# Patient Record
Sex: FEMALE | Race: WHITE | Employment: FULL TIME | URBAN - METROPOLITAN AREA
[De-identification: names, ages, dates, MRNs, and addresses within clinical notes are randomized per-mention and may not be internally consistent; named-entity substitution may affect disease eponyms.]

---

## 2018-03-06 ENCOUNTER — OFFICE VISIT (OUTPATIENT)
Dept: INTERNAL MEDICINE CLINIC | Facility: CLINIC | Age: 33
End: 2018-03-06
Payer: COMMERCIAL

## 2018-03-06 VITALS
HEART RATE: 83 BPM | DIASTOLIC BLOOD PRESSURE: 86 MMHG | SYSTOLIC BLOOD PRESSURE: 144 MMHG | OXYGEN SATURATION: 96 % | WEIGHT: 293 LBS | RESPIRATION RATE: 18 BRPM | HEIGHT: 69 IN | BODY MASS INDEX: 43.4 KG/M2

## 2018-03-06 DIAGNOSIS — R00.2 PALPITATIONS: ICD-10-CM

## 2018-03-06 DIAGNOSIS — E28.2 PCOS (POLYCYSTIC OVARIAN SYNDROME): ICD-10-CM

## 2018-03-06 DIAGNOSIS — Z13.6 SCREENING FOR CARDIOVASCULAR CONDITION: ICD-10-CM

## 2018-03-06 DIAGNOSIS — E66.01 MORBID OBESITY (HCC): ICD-10-CM

## 2018-03-06 DIAGNOSIS — F41.1 GENERALIZED ANXIETY DISORDER: Primary | ICD-10-CM

## 2018-03-06 DIAGNOSIS — R73.9 HYPERGLYCEMIA: ICD-10-CM

## 2018-03-06 PROCEDURE — 99204 OFFICE O/P NEW MOD 45 MIN: CPT | Performed by: INTERNAL MEDICINE

## 2018-03-06 PROCEDURE — 93000 ELECTROCARDIOGRAM COMPLETE: CPT | Performed by: INTERNAL MEDICINE

## 2018-03-06 NOTE — PATIENT INSTRUCTIONS
Obesity   WHAT YOU NEED TO KNOW:   What is obesity? Obesity is when your body mass index (BMI) is greater than 30  Your healthcare provider will use your height and weight to measure your BMI  What are the risks of obesity? Obesity can cause many health problems, including injuries or physical disability  You may need tests to check for the following:  · Diabetes     · High blood pressure or high cholesterol     · Heart disease     · Gallbladder or liver disease     · Cancer of the colon, breast, prostate, liver, or kidney     · Sleep apnea     · Arthritis or gout  How is obesity treated? The goal of treatment is to help you lose weight so your health will improve  Even a small decrease in BMI can reduce the risk for many health problems  Your healthcare provider will help you set a weight-loss goal   · Lifestyle changes  are the first step in treating obesity  These include making healthy food choices and getting regular physical activity  Your healthcare provider may suggest a weight-loss program that involves coaching, education, and therapy  · Medicine  may help you lose weight when it is used with a healthy diet and physical activity  · Surgery  can help you lose weight if you are very obese and have other health problems  There are several types of weight-loss surgery  Ask your healthcare provider for more information  How can I be successful at losing weight? · Set small, realistic goals  An example of a small goal is to walk for 20 minutes 5 days a week  Anther goal is to lose 5% of your body weight  · Tell friends, family members, and coworkers about your goals  and ask for their support  Ask a friend to lose weight with you, or join a weight-loss support group  · Identify foods or triggers that may cause you to overeat , and find ways to avoid them  Remove tempting high-calorie foods from your home and workplace  Place a bowl of fresh fruit on your kitchen counter   If stress causes you to eat, then find other ways to cope with stress  · Keep a diary to track what you eat and drink  Also write down how many minutes of physical activity you do each day  Weigh yourself once a week and record it in your diary  What eating changes should I make? You will need to eat 500 to 1,000 fewer calories each day than you currently eat to lose 1 to 2 pounds a week  The following changes will help you cut calories:  · Eat smaller portions  Use small plates, no larger than 9 inches in diameter  Fill your plate half full of fruits and vegetables  Measure your food using measuring cups until you know what a serving size looks like  · Eat 3 meals and 1 or 2 snacks each day  Plan your meals in advance  [place] and eat at home most of the time  Eat slowly  · Eat fruits and vegetables at every meal   They are low in calories and high in fiber, which makes you feel full  Do not add butter, margarine, or cream sauce to vegetables  Use herbs to season steamed vegetables  · Eat less fat and fewer fried foods  Eat more baked or grilled chicken and fish  These protein sources are lower in calories and fat than red meat  Limit fast food  Dress your salads with olive oil and vinegar instead of bottled dressing  · Limit the amount of sugar you eat  Do not drink sugary beverages  Limit alcohol  What activity changes should I make? Physical activity is good for your body in many ways  It helps you burn calories and build strong muscles  It decreases stress and depression, and improves your mood  It can also help you sleep better  Talk to your healthcare provider before you begin an exercise program   · Exercise for at least 30 minutes 5 days a week  Start slowly  Set aside time each day for physical activity that you enjoy and that is convenient for you  It is best to do both weight training and an activity that increases your heart rate, such as walking, bicycling, or swimming       · Find ways to be more active  Do yard work and housecleaning  Walk up the stairs instead of using elevators  Spend your leisure time going to events that require walking, such as outdoor festivals or fairs  This extra physical activity can help you lose weight and keep it off  When should I seek immediate care? · You have a severe headache, confusion, or difficulty speaking  · You have weakness on one side of your body  · You have chest pain, sweating, or shortness of breath  When should I contact my healthcare provider? · You have symptoms of gallbladder or liver disease, such as pain in your upper abdomen  · You have knee or hip pain and discomfort while walking  · You have symptoms of diabetes, such as intense hunger and thirst, and frequent urination  · You have symptoms of sleep apnea, such as snoring or daytime sleepiness  · You have questions or concerns about your condition or care  CARE AGREEMENT:   You have the right to help plan your care  Learn about your health condition and how it may be treated  Discuss treatment options with your caregivers to decide what care you want to receive  You always have the right to refuse treatment  The above information is an  only  It is not intended as medical advice for individual conditions or treatments  Talk to your doctor, nurse or pharmacist before following any medical regimen to see if it is safe and effective for you  © 2017 [address] [employer] Information is for End User's use only and may not be sold, redistributed or otherwise used for commercial purposes  All illustrations and images included in CareNotes® are the copyrighted property of A D A M , Inc  or Jeff Rios

## 2018-03-08 ENCOUNTER — TELEPHONE (OUTPATIENT)
Dept: INTERNAL MEDICINE CLINIC | Facility: CLINIC | Age: 33
End: 2018-03-08

## 2018-03-08 ENCOUNTER — LAB (OUTPATIENT)
Dept: LAB | Facility: CLINIC | Age: 33
End: 2018-03-08
Payer: COMMERCIAL

## 2018-03-08 DIAGNOSIS — F41.1 GENERALIZED ANXIETY DISORDER: ICD-10-CM

## 2018-03-08 DIAGNOSIS — Z13.6 SCREENING FOR CARDIOVASCULAR CONDITION: ICD-10-CM

## 2018-03-08 DIAGNOSIS — R73.9 HYPERGLYCEMIA: ICD-10-CM

## 2018-03-08 LAB
ALBUMIN SERPL BCP-MCNC: 3.9 G/DL (ref 3.5–5)
ALP SERPL-CCNC: 54 U/L (ref 46–116)
ALT SERPL W P-5'-P-CCNC: 53 U/L (ref 12–78)
ANION GAP SERPL CALCULATED.3IONS-SCNC: 6 MMOL/L (ref 4–13)
AST SERPL W P-5'-P-CCNC: 24 U/L (ref 5–45)
BASOPHILS # BLD AUTO: 0.03 THOUSANDS/ΜL (ref 0–0.1)
BASOPHILS NFR BLD AUTO: 0 % (ref 0–1)
BILIRUB SERPL-MCNC: 0.62 MG/DL (ref 0.2–1)
BUN SERPL-MCNC: 10 MG/DL (ref 5–25)
CALCIUM SERPL-MCNC: 8.6 MG/DL (ref 8.3–10.1)
CHLORIDE SERPL-SCNC: 104 MMOL/L (ref 100–108)
CHOLEST SERPL-MCNC: 156 MG/DL (ref 50–200)
CO2 SERPL-SCNC: 28 MMOL/L (ref 21–32)
CREAT SERPL-MCNC: 0.85 MG/DL (ref 0.6–1.3)
EOSINOPHIL # BLD AUTO: 0.21 THOUSAND/ΜL (ref 0–0.61)
EOSINOPHIL NFR BLD AUTO: 3 % (ref 0–6)
ERYTHROCYTE [DISTWIDTH] IN BLOOD BY AUTOMATED COUNT: 12.2 % (ref 11.6–15.1)
EST. AVERAGE GLUCOSE BLD GHB EST-MCNC: 103 MG/DL
GFR SERPL CREATININE-BSD FRML MDRD: 91 ML/MIN/1.73SQ M
GLUCOSE P FAST SERPL-MCNC: 76 MG/DL (ref 65–99)
HBA1C MFR BLD: 5.2 % (ref 4.2–6.3)
HCT VFR BLD AUTO: 42.4 % (ref 34.8–46.1)
HDLC SERPL-MCNC: 39 MG/DL (ref 40–60)
HGB BLD-MCNC: 15.1 G/DL (ref 11.5–15.4)
LDLC SERPL CALC-MCNC: 89 MG/DL (ref 0–100)
LYMPHOCYTES # BLD AUTO: 3.2 THOUSANDS/ΜL (ref 0.6–4.47)
LYMPHOCYTES NFR BLD AUTO: 43 % (ref 14–44)
MCH RBC QN AUTO: 31.8 PG (ref 26.8–34.3)
MCHC RBC AUTO-ENTMCNC: 35.6 G/DL (ref 31.4–37.4)
MCV RBC AUTO: 89 FL (ref 82–98)
MONOCYTES # BLD AUTO: 0.52 THOUSAND/ΜL (ref 0.17–1.22)
MONOCYTES NFR BLD AUTO: 7 % (ref 4–12)
NEUTROPHILS # BLD AUTO: 3.46 THOUSANDS/ΜL (ref 1.85–7.62)
NEUTS SEG NFR BLD AUTO: 47 % (ref 43–75)
NRBC BLD AUTO-RTO: 0 /100 WBCS
PLATELET # BLD AUTO: 238 THOUSANDS/UL (ref 149–390)
PMV BLD AUTO: 11.4 FL (ref 8.9–12.7)
POTASSIUM SERPL-SCNC: 3.4 MMOL/L (ref 3.5–5.3)
PROT SERPL-MCNC: 8.2 G/DL (ref 6.4–8.2)
RBC # BLD AUTO: 4.75 MILLION/UL (ref 3.81–5.12)
SODIUM SERPL-SCNC: 138 MMOL/L (ref 136–145)
TRIGL SERPL-MCNC: 141 MG/DL
TSH SERPL DL<=0.05 MIU/L-ACNC: 2 UIU/ML (ref 0.36–3.74)
WBC # BLD AUTO: 7.43 THOUSAND/UL (ref 4.31–10.16)

## 2018-03-08 PROCEDURE — 85025 COMPLETE CBC W/AUTO DIFF WBC: CPT

## 2018-03-08 PROCEDURE — 84443 ASSAY THYROID STIM HORMONE: CPT

## 2018-03-08 PROCEDURE — 36415 COLL VENOUS BLD VENIPUNCTURE: CPT

## 2018-03-08 PROCEDURE — 83036 HEMOGLOBIN GLYCOSYLATED A1C: CPT

## 2018-03-08 PROCEDURE — 80061 LIPID PANEL: CPT

## 2018-03-08 PROCEDURE — 80053 COMPREHEN METABOLIC PANEL: CPT

## 2018-03-09 NOTE — TELEPHONE ENCOUNTER
----- Message from Yaz Israel MD sent at 3/8/2018  5:50 PM EST -----  Potassium slightly low, pl eat a diet rich in potassium

## 2018-03-13 ENCOUNTER — HOSPITAL ENCOUNTER (EMERGENCY)
Facility: HOSPITAL | Age: 33
Discharge: HOME | End: 2018-03-13
Attending: EMERGENCY MEDICINE
Payer: COMMERCIAL

## 2018-03-13 ENCOUNTER — APPOINTMENT (EMERGENCY)
Dept: CT IMAGING | Facility: HOSPITAL | Age: 33
End: 2018-03-13
Payer: COMMERCIAL

## 2018-03-13 VITALS
BODY MASS INDEX: 43.4 KG/M2 | HEART RATE: 86 BPM | DIASTOLIC BLOOD PRESSURE: 71 MMHG | WEIGHT: 293 LBS | TEMPERATURE: 98 F | SYSTOLIC BLOOD PRESSURE: 138 MMHG | HEIGHT: 69 IN | RESPIRATION RATE: 18 BRPM | OXYGEN SATURATION: 98 %

## 2018-03-13 DIAGNOSIS — R00.2 PALPITATIONS: ICD-10-CM

## 2018-03-13 DIAGNOSIS — R42 DIZZINESS: Primary | ICD-10-CM

## 2018-03-13 DIAGNOSIS — I49.8 BIGEMINY: ICD-10-CM

## 2018-03-13 LAB
ANION GAP SERPL CALCULATED.3IONS-SCNC: 8 MMOL/L (ref 4–13)
BASOPHILS # BLD AUTO: 0.07 THOUSANDS/ΜL (ref 0–0.1)
BASOPHILS NFR BLD AUTO: 1 % (ref 0–1)
BUN SERPL-MCNC: 9 MG/DL (ref 5–25)
CALCIUM SERPL-MCNC: 9.7 MG/DL (ref 8.3–10.1)
CHLORIDE SERPL-SCNC: 103 MMOL/L (ref 100–108)
CO2 SERPL-SCNC: 28 MMOL/L (ref 21–32)
CREAT SERPL-MCNC: 0.83 MG/DL (ref 0.6–1.3)
EOSINOPHIL # BLD AUTO: 0.23 THOUSAND/ΜL (ref 0–0.61)
EOSINOPHIL NFR BLD AUTO: 3 % (ref 0–6)
ERYTHROCYTE [DISTWIDTH] IN BLOOD BY AUTOMATED COUNT: 11.9 % (ref 11.6–15.1)
GFR SERPL CREATININE-BSD FRML MDRD: 94 ML/MIN/1.73SQ M
GLUCOSE SERPL-MCNC: 91 MG/DL (ref 65–140)
HCG SERPL QL: NEGATIVE
HCT VFR BLD AUTO: 41.8 % (ref 34.8–46.1)
HGB BLD-MCNC: 14.8 G/DL (ref 11.5–15.4)
LYMPHOCYTES # BLD AUTO: 4.08 THOUSANDS/ΜL (ref 0.6–4.47)
LYMPHOCYTES NFR BLD AUTO: 46 % (ref 14–44)
MCH RBC QN AUTO: 31.2 PG (ref 26.8–34.3)
MCHC RBC AUTO-ENTMCNC: 35.4 G/DL (ref 31.4–37.4)
MCV RBC AUTO: 88 FL (ref 82–98)
MONOCYTES # BLD AUTO: 0.75 THOUSAND/ΜL (ref 0.17–1.22)
MONOCYTES NFR BLD AUTO: 8 % (ref 4–12)
NEUTROPHILS # BLD AUTO: 3.72 THOUSANDS/ΜL (ref 1.85–7.62)
NEUTS SEG NFR BLD AUTO: 42 % (ref 43–75)
NRBC BLD AUTO-RTO: 0 /100 WBCS
PLATELET # BLD AUTO: 249 THOUSANDS/UL (ref 149–390)
PMV BLD AUTO: 10.8 FL (ref 8.9–12.7)
POTASSIUM SERPL-SCNC: 4 MMOL/L (ref 3.5–5.3)
RBC # BLD AUTO: 4.74 MILLION/UL (ref 3.81–5.12)
SODIUM SERPL-SCNC: 139 MMOL/L (ref 136–145)
TROPONIN I SERPL-MCNC: <0.02 NG/ML
WBC # BLD AUTO: 8.88 THOUSAND/UL (ref 4.31–10.16)

## 2018-03-13 PROCEDURE — 70450 CT HEAD/BRAIN W/O DYE: CPT

## 2018-03-13 PROCEDURE — 80048 BASIC METABOLIC PNL TOTAL CA: CPT | Performed by: EMERGENCY MEDICINE

## 2018-03-13 PROCEDURE — 36415 COLL VENOUS BLD VENIPUNCTURE: CPT | Performed by: EMERGENCY MEDICINE

## 2018-03-13 PROCEDURE — 99284 EMERGENCY DEPT VISIT MOD MDM: CPT

## 2018-03-13 PROCEDURE — 85025 COMPLETE CBC W/AUTO DIFF WBC: CPT | Performed by: EMERGENCY MEDICINE

## 2018-03-13 PROCEDURE — 93005 ELECTROCARDIOGRAM TRACING: CPT

## 2018-03-13 PROCEDURE — 84484 ASSAY OF TROPONIN QUANT: CPT | Performed by: EMERGENCY MEDICINE

## 2018-03-13 PROCEDURE — 84703 CHORIONIC GONADOTROPIN ASSAY: CPT | Performed by: EMERGENCY MEDICINE

## 2018-03-13 NOTE — ED PROVIDER NOTES
History  Chief Complaint   Patient presents with    Dizziness     Pt with episodes of dizziness and some heart palpitations that started today  Pt with a hx of anxiety      HPI  Patient is a 19-year-old female, reports over the last 2 weeks she has had dizziness, which she describes as a steady feeling comma at times feels like she is going to pass out  Patient reports some heart palpitations, she reports she got an apple watch and apparently heart rate has gone to 39 over the last 2 weeks  Patient was seen by her family doctor CT some diagnostic testing, was told that she had hypokalemia  I was able to review those reports in epic  Patient denies any current chest pain  There is no shortness of breath  There is no loss of consciousness  Patient reports her symptoms may be related to anxiety but was concerned so came to the emergency department due to the dizziness and feeling unsteady  She denies any focal weakness  There is no numbness  Past medical history of polycystic ovarian syndrome, anxiety,  Family history noncontributory  Social history nonsmoker, here with her mom  None       Past Medical History:   Diagnosis Date    Anxiety     PCOS (polycystic ovarian syndrome)        History reviewed  No pertinent surgical history  Family History   Problem Relation Age of Onset   [employer] Breast cancer Mother     Alcohol abuse Father     Heart failure Father     Breast cancer Maternal Grandmother     Diabetes Paternal Grandmother     Bipolar disorder Paternal Aunt     Alcohol abuse Paternal Aunt      I have reviewed and agree with the history as documented  Social History   Substance Use Topics    Smoking status: Never Smoker    Smokeless tobacco: Never Used    Alcohol use Yes      Comment: Social        Review of Systems   Constitutional: Negative for diaphoresis, fatigue and fever  HENT: Negative for congestion, ear pain, nosebleeds and sore throat      Eyes: Negative for photophobia, pain, discharge and visual disturbance  Respiratory: Negative for cough, choking, chest tightness, shortness of breath and wheezing  Cardiovascular: Positive for palpitations  Negative for chest pain  Gastrointestinal: Negative for abdominal distention, abdominal pain, diarrhea and vomiting  Genitourinary: Negative for dysuria, flank pain and frequency  Musculoskeletal: Negative for back pain, gait problem and joint swelling  Skin: Negative for color change and rash  Neurological: Positive for dizziness and headaches  Negative for syncope  Psychiatric/Behavioral: Negative for behavioral problems and confusion  The patient is not nervous/anxious  All other systems reviewed and are negative  Physical Exam  ED Triage Vitals [03/13/18 1716]   Temperature Pulse Respirations Blood Pressure SpO2   98 °F (36 7 °C) (!) 54 18 (!) 231/109 100 %      Temp Source Heart Rate Source Patient Position - Orthostatic VS BP Location FiO2 (%)   Oral Monitor Sitting Right arm --      Pain Score       No Pain           Orthostatic Vital Signs  Vitals:    03/13/18 1716 03/13/18 1839   BP: (!) 231/109 138/71   Pulse: (!) 54 86   Patient Position - Orthostatic VS: Sitting Lying       Physical Exam   Constitutional: She is oriented to person, place, and time  She appears well-developed and well-nourished  HENT:   Head: Normocephalic  Right Ear: External ear normal    Left Ear: External ear normal    Nose: Nose normal    Mouth/Throat: Oropharynx is clear and moist    There is an effusion on the left side left tympanic membrane, discussed with patient advised use Flonase  Eyes: EOM and lids are normal  Pupils are equal, round, and reactive to light  Neck: Normal range of motion  Neck supple  Cardiovascular: Normal rate, regular rhythm, normal heart sounds and intact distal pulses  Pulmonary/Chest: Effort normal and breath sounds normal  No respiratory distress  Abdominal: Soft   Bowel sounds are normal  There is no tenderness  Musculoskeletal: Normal range of motion  She exhibits no deformity  Neurological: She is alert and oriented to person, place, and time  She has normal strength  No cranial nerve deficit or sensory deficit  She displays a negative Romberg sign  Coordination normal  GCS eye subscore is 4  GCS verbal subscore is 5  GCS motor subscore is 6  No focal weakness, normal gait, patient is steady with her eyes closed and her leg apart  Skin: Skin is warm and dry  Psychiatric: She has a normal mood and affect  Nursing note and vitals reviewed  pulse oximetry 100% on room air adequate oxygenation, there is no hypoxia    ED Medications  Medications - No data to display    Diagnostic Studies  Results Reviewed     Procedure Component Value Units Date/Time    Basic metabolic panel [[id number]] Collected:  03/13/18 1745    Lab Status:  Final result Specimen:  Blood from Arm, Left Updated:  03/13/18 1813     Sodium 139 mmol/L      Potassium 4 0 mmol/L      Chloride 103 mmol/L      CO2 28 mmol/L      Anion Gap 8 mmol/L      BUN 9 mg/dL      Creatinine 0 83 mg/dL      Glucose 91 mg/dL      Calcium 9 7 mg/dL      eGFR 94 ml/min/1 73sq m     Narrative:         National Kidney Disease Education Program recommendations are as follows:  GFR calculation is accurate only with a steady state creatinine  Chronic Kidney disease less than 60 ml/min/1 73 sq  meters  Kidney failure less than 15 ml/min/1 73 sq  meters      hCG, qualitative pregnancy [[id number]]  (Normal) Collected:  03/13/18 1745    Lab Status:  Final result Specimen:  Blood from Arm, Left Updated:  03/13/18 1813     Preg, Serum Negative    Troponin I [[id number]]  (Normal) Collected:  03/13/18 1745    Lab Status:  Final result Specimen:  Blood from Arm, Left Updated:  03/13/18 1811     Troponin I <0 02 ng/mL     Narrative:         [employer] Chemistry analyzer 99% cutoff is > 0 04 ng/mL in network labs    o cTnI 99% cutoff is useful only when applied to patients in the clinical setting of myocardial ischemia  o cTnI 99% cutoff should be interpreted in the context of clinical history, ECG findings and possibly cardiac imaging to establish correct diagnosis  o cTnI 99% cutoff may be suggestive but clearly not indicative of a coronary event without the clinical setting of myocardial ischemia  CBC and differential [[id number]]  (Abnormal) Collected:  03/13/18 1745    Lab Status:  Final result Specimen:  Blood from Arm, Left Updated:  03/13/18 1753     WBC 8 88 Thousand/uL      RBC 4 74 Million/uL      Hemoglobin 14 8 g/dL      Hematocrit 41 8 %      MCV 88 fL      MCH 31 2 pg      MCHC 35 4 g/dL      RDW 11 9 %      MPV 10 8 fL      Platelets 845 Thousands/uL      nRBC 0 /100 WBCs      Neutrophils Relative 42 (L) %      Lymphocytes Relative 46 (H) %      Monocytes Relative 8 %      Eosinophils Relative 3 %      Basophils Relative 1 %      Neutrophils Absolute 3 72 Thousands/µL      Lymphocytes Absolute 4 08 Thousands/µL      Monocytes Absolute 0 75 Thousand/µL      Eosinophils Absolute 0 23 Thousand/µL      Basophils Absolute 0 07 Thousands/µL                  CT head without contrast   Final Result by Porsha Aguilar DO (03/13 1811)   No acute intracranial abnormality  Workstation performed: [id number]                    Procedures  ECG 12 Lead Documentation  Date/Time: 3/13/2018 6:01 PM  Performed by: Tea León  Authorized by: Tea León     Indications / Diagnosis:  Palpitations  ECG reviewed by me, the ED Provider: yes    Patient location:  ED  Previous ECG:     Previous ECG:  Unavailable    Comparison to cardiac monitor: Yes    Interpretation:     Interpretation: non-specific    Rate:     ECG rate:  82    ECG rate assessment: normal    Rhythm:     Rhythm: sinus rhythm    Ectopy:     Ectopy: bigeminy    QRS:     QRS axis:  Normal  Comments:      Patient had 2 EKGs 1st 1 showed bigeminy, nonspecific ST-T wave changes, no acute ST elevations    After waiting a few minutes patient had a repeat EKG which was normal, nonspecific ST-T wave changes but no bigeminy  Patient was not symptomatic during the previous EKG  Phone Contacts  ED Phone Contact    ED Course  ED Course     Diagnostic testing showed negative pregnancy test, patient's electrolytes were normal no sign of electrolyte abnormality, cardiac troponin was normal no sign of cardiac ischemia  patient's white count was normal at 8 8 no sign of inflammation, hemoglobin was normal at 14 8 no sign of anemia  CT scan of the brain showed no acute pathology, CT was required due to dizziness, comma headaches comma concern for intracranial pathology, possible brain tumor possible subarachnoid bleed  Was negative  [facility] medical decision making 80-year-old female presents with dizziness, palpitations, EKG consistent with bigeminy, no sign of cardiac ischemia, no sign of heart failure, discussed with patient advised follow up with her cardiologist   Dizziness the patient has a left ear effusion we discussed use of Flonase  We discussed follow-up with her doctor for further evaluation, normal CT scan of the brain no sign of intracranial pathology no tumor  Discussed indications to return  CritCare Time    Disposition  Final diagnoses:   Dizziness   Bigeminy   Palpitations     Time reflects when diagnosis was documented in both MDM as applicable and the Disposition within this note     Time User Action Codes Description Comment    3/13/2018  6:20 PM Tracy Greene [R42] Dizziness     3/13/2018  6:20 PM Tracy Greene [I49 9] [address]     3/13/2018  6:21 PM Sachin Fu [R00 2] Palpitations       ED Disposition     ED Disposition Condition Comment    Discharge  Fay 5930 discharge to home/self care      Condition at discharge: Good        Follow-up Information     Follow up With Specialties Details Why Tc Genao MD Internal Medicine   [id number] [id number]  [phone]          There are no discharge medications for this patient  No discharge procedures on file      ED Provider  Electronically Signed by           Srinivasa Delong MD  03/14/18 7630

## 2018-03-13 NOTE — DISCHARGE INSTRUCTIONS
Heart Palpitations   WHAT YOU NEED TO KNOW:   Heart palpitations are feelings that your heart races, jumps, throbs, or flutters  You may feel extra beats, no beats for a short time, or skipped beats  You may have these feelings in your chest, throat, or neck  They may happen when you are sitting, standing, or lying  Heart palpitations may be frightening, but are usually not caused by a serious problem  DISCHARGE INSTRUCTIONS:   Call 911 or have someone else call for any of the following:   · You have any of the following signs of a heart attack:      ¨ Squeezing, pressure, or pain in your chest that lasts longer than 5 minutes or returns    ¨ Discomfort or pain in your back, neck, jaw, stomach, or arm     ¨ Trouble breathing    ¨ Nausea or vomiting    ¨ Lightheadedness or a sudden cold sweat, especially with chest pain or trouble breathing    · You have any of the following signs of a stroke:      ¨ Numbness or drooping on one side of your face     ¨ Weakness in an arm or leg    ¨ Confusion or difficulty speaking    ¨ Dizziness, a severe headache, or vision loss    · You faint or lose consciousness  Return to the emergency department if:   · Your palpitations happen more often or get more intense  Contact your healthcare provider if:   · You have new or worsening swelling in your feet or ankles  · You have questions or concerns about your condition or care  Follow up with your healthcare provider as directed: You may need to follow up with a cardiologist  Dede Hart may need tests to check for heart problems that cause palpitations  Write down your questions so you remember to ask them during your visits  Keep a record:  Write down when your palpitations start and stop, what you were doing when they started, and your symptoms  Keep track of what you ate or drank within a few hours of your palpitations  Include anything that seemed to help your symptoms, such as lying down or holding your breath   This record will help you and your healthcare provider learn what triggers your palpitations  Bring this record with you to your follow up visits  Help prevent heart palpitations:   · Manage stress and anxiety  Find ways to relax such as listening to music, meditating, or doing yoga  Exercise can also help decrease stress and anxiety  Talk to someone you trust about your stress or anxiety  You can also talk to a therapist      · Get plenty of sleep every night  Ask your healthcare provider how much sleep you need each night  · Do not drink caffeine or alcohol  Caffeine and alcohol can make your palpitations worse  Caffeine is found in soda, coffee, tea, chocolate, and drinks that increase your energy  · Do not smoke  Nicotine and other chemicals in cigarettes and cigars may damage your heart and blood vessels  Ask your healthcare provider for information if you currently smoke and need help to quit  E-cigarettes or smokeless tobacco still contain nicotine  Talk to your healthcare provider before you use these products  · Do not use illegal drugs  Talk to your healthcare provider if you use illegal drugs and want help to quit  © 2017 [address] [facility] Information is for End User's use only and may not be sold, redistributed or otherwise used for commercial purposes  All illustrations and images included in CareNotes® are the copyrighted property of A D A M , Inc  or [address]  The above information is an  only  It is not intended as medical advice for individual conditions or treatments  Talk to your doctor, nurse or pharmacist before following any medical regimen to see if it is safe and effective for you  Follow up with your physician for further evaluation  Return increasing symptoms or problems     Dizziness   WHAT YOU NEED TO KNOW:   Dizziness is a feeling of being off balance or unsteady   Common causes of dizziness are an inner ear fluid imbalance or a lack of oxygen in your blood  Dizziness may be acute (lasts 3 days or less) or chronic (lasts longer than 3 days)  You may have dizzy spells that last from seconds to a few hours  DISCHARGE INSTRUCTIONS:   Return to the emergency department if:   · You have a headache and a stiff neck  · You have shaking chills and a fever  · You vomit over and over with no relief  · Your vomit or bowel movements are red or black  · You have pain in your chest, back, or abdomen  · You have numbness, especially in your face, arms, or legs  · You have trouble moving your arms or legs  · You are confused  Contact your healthcare provider if:   · You have a fever  · Your symptoms do not get better with treatment  · You have questions or concerns about your condition or care  Manage your symptoms:   · Do not drive  or operate heavy machinery when you are dizzy  · Get up slowly  from sitting or lying down  · Drink plenty of liquids  Liquids help prevent dehydration  Ask how much liquid to drink each day and which liquids are best for you  Follow up with your healthcare provider as directed:  Write down your questions so you remember to ask them during your visits  © 2017 [address]  Information is for End User's use only and may not be sold, redistributed or otherwise used for commercial purposes  All illustrations and images included in CareNotes® are the copyrighted property of [employer]  or Jeff Rios  The above information is an  only  It is not intended as medical advice for individual conditions or treatments  Talk to your doctor, nurse or pharmacist before following any medical regimen to see if it is safe and effective for you

## 2018-03-14 ENCOUNTER — TELEPHONE (OUTPATIENT)
Dept: INTERNAL MEDICINE CLINIC | Facility: CLINIC | Age: 33
End: 2018-03-14

## 2018-03-14 LAB
ATRIAL RATE: 82 BPM
ATRIAL RATE: 88 BPM
P AXIS: 49 DEGREES
P AXIS: 57 DEGREES
PR INTERVAL: 136 MS
PR INTERVAL: 140 MS
QRS AXIS: 96 DEGREES
QRS AXIS: 98 DEGREES
QRSD INTERVAL: 102 MS
QRSD INTERVAL: 104 MS
QT INTERVAL: 400 MS
QT INTERVAL: 404 MS
QTC INTERVAL: 467 MS
QTC INTERVAL: 488 MS
T WAVE AXIS: 63 DEGREES
T WAVE AXIS: 74 DEGREES
VENTRICULAR RATE: 82 BPM
VENTRICULAR RATE: 88 BPM

## 2018-03-14 PROCEDURE — 93010 ELECTROCARDIOGRAM REPORT: CPT | Performed by: INTERNAL MEDICINE

## 2018-03-16 ENCOUNTER — VBI (OUTPATIENT)
Dept: ADMINISTRATIVE | Facility: OTHER | Age: 33
End: 2018-03-16

## 2018-03-16 NOTE — TELEPHONE ENCOUNTER
Rosmery Jacobs    ED Visit Information     Ed visit date: 3/13/18  Diagnosis Description: Dizziness; Bigeminy; Palpitations  In Network? Yes [place]  Discharge status: Home  Discharged with meds ? NA  Number of ED visits to date: 1  ED Severity:3     Outreach Information    Outreach successful: N/A  Date letter mailed:n/a  Date Finalized:3/16/18    Care Coordination    Follow up appointment with pcp: yes 3/14/18  Transportation issues ?  NA    [employer] type:  3 Day Outreach  [facility] PCP:  Yes  Practice Contacted Patient ?:  Yes  Pt had ED follow up with pcp/staff ?:  Yes Call with NO visit scheduled   Telephonic

## 2018-03-22 RX ORDER — LISINOPRIL 10 MG/1
TABLET ORAL
COMMUNITY
Start: 2018-03-16 | End: 2019-10-25

## 2018-03-26 ENCOUNTER — OFFICE VISIT (OUTPATIENT)
Dept: FAMILY MEDICINE CLINIC | Facility: CLINIC | Age: 33
End: 2018-03-26
Payer: COMMERCIAL

## 2018-03-26 VITALS
BODY MASS INDEX: 43.22 KG/M2 | HEIGHT: 69 IN | OXYGEN SATURATION: 98 % | DIASTOLIC BLOOD PRESSURE: 72 MMHG | WEIGHT: 291.8 LBS | SYSTOLIC BLOOD PRESSURE: 140 MMHG | HEART RATE: 78 BPM

## 2018-03-26 DIAGNOSIS — E28.2 PCOS (POLYCYSTIC OVARIAN SYNDROME): ICD-10-CM

## 2018-03-26 DIAGNOSIS — F41.1 GENERALIZED ANXIETY DISORDER: Primary | ICD-10-CM

## 2018-03-26 DIAGNOSIS — E66.01 MORBID OBESITY (HCC): ICD-10-CM

## 2018-03-26 PROCEDURE — 99203 OFFICE O/P NEW LOW 30 MIN: CPT | Performed by: PHYSICIAN ASSISTANT

## 2018-03-26 RX ORDER — DIPHENOXYLATE HYDROCHLORIDE AND ATROPINE SULFATE 2.5; .025 MG/1; MG/1
1 TABLET ORAL DAILY
COMMUNITY
End: 2019-01-02 | Stop reason: ALTCHOICE

## 2018-03-26 RX ORDER — BUPROPION HYDROCHLORIDE 150 MG/1
150 TABLET ORAL DAILY
Qty: 30 TABLET | Refills: 2 | Status: SHIPPED | OUTPATIENT
Start: 2018-03-26 | End: 2018-09-14

## 2018-03-26 NOTE — PROGRESS NOTES
Assessment/Plan:       Diagnoses and all orders for this visit:    Generalized anxiety disorder  -     buPROPion (WELLBUTRIN XL) 150 mg 24 hr tablet; Take 1 tablet (150 mg total) by mouth daily    PCOS (polycystic ovarian syndrome)  -     Ambulatory referral to Obstetrics / Gynecology; Future    Morbid obesity ([facility] [address] )    Other orders  -     multivitamin (THERAGRAN) TABS; Take 1 tablet by mouth daily          Subjective:      Patient ID: Elaine Delgado is a 28 y o  female  Patient is here to establish as a patient  She is being seen by Dr catherine for irregular heartbeat and palpitations  He has recently put her on lisinopril for this and for her elevated blood pressure  She is to see him back next week  He has done it is extensive testing and he is to review that testing with her  Patient has been noting palpitations for several weeks  She also notes her anxiety has been very very bad  She has been on anxiety put medications in the past which was BuSpar and felt worse on those so she stopped them  Patient said she was better after that and did not use any medications but recently as I stated before her anxiety is worsening and she would like to start some medication  She does note that her anxiety does interfere with her daily activities at times  She feels like she just does not want to get out of bed and do anything  Patient is also reluctant to start medications due to weight gain since she is already overweight and is trying to lose weight  Patient has been diagnosed with polycystic ovarian syndrome in the past and needs to follow up with a gyn due to she has not had a period in 2 years  She does not want to go back on birth control pills because she is not sexually active  At this time she states she has changed her eating habits is he is eating more healthy  She also has started eating small snacks type meals every 2-3 hours to help her feel better          The following portions of the patient's history were reviewed and updated as appropriate:   She has a past medical history of Anxiety and PCOS (polycystic ovarian syndrome)  ,   does not have any pertinent problems on file  ,   has no past surgical history on file  ,  family history includes Alcohol abuse in her father and paternal aunt; Bipolar disorder in her paternal aunt; Breast cancer in her maternal grandmother and mother; Diabetes in her paternal grandmother; Heart failure in her father  ,   reports that she has never smoked  She has never used smokeless tobacco  She reports that she drinks alcohol  She reports that she does not use drugs  ,  has No Known Allergies     Current Outpatient Prescriptions   Medication Sig Dispense Refill    lisinopril (ZESTRIL) 10 mg tablet       multivitamin (THERAGRAN) TABS Take 1 tablet by mouth daily      buPROPion (WELLBUTRIN XL) 150 mg 24 hr tablet Take 1 tablet (150 mg total) by mouth daily 30 tablet 2     No current facility-administered medications for this visit  Review of Systems   Constitutional: Positive for appetite change and fatigue  Negative for activity change  HENT: Negative for hearing loss and trouble swallowing  Eyes: Negative for pain and visual disturbance  Respiratory: Negative for chest tightness and shortness of breath  Cardiovascular: Positive for chest pain and palpitations  Negative for leg swelling  Gastrointestinal: Positive for constipation  Endocrine: Negative  Genitourinary: Positive for menstrual problem  Negative for difficulty urinating  Musculoskeletal: Negative  Skin: Negative  Allergic/Immunologic: Negative  Neurological: Positive for dizziness and headaches  Negative for seizures, syncope, speech difficulty and numbness  Hematological: Negative  Psychiatric/Behavioral: Negative for self-injury and suicidal ideas  The patient is nervous/anxious            Objective:  Vitals:    03/26/18 1258 03/26/18 1325   BP: 160/70 140/72   Pulse: 78    SpO2: 98%    Weight: 132 kg (291 lb 12 8 oz)    Height: 5' 9" (1 753 m)      Body mass index is 43 09 kg/m²  Physical Exam   Constitutional: She is oriented to person, place, and time  She appears well-developed and well-nourished  HENT:   Head: Normocephalic  Right Ear: External ear normal    Left Ear: External ear normal    Mouth/Throat: Oropharynx is clear and moist    Eyes: Conjunctivae are normal  Pupils are equal, round, and reactive to light  Neck: Normal range of motion  Carotid bruit is not present  No tracheal deviation present  No thyromegaly present  Cardiovascular: Normal rate, normal heart sounds and intact distal pulses  An irregular rhythm present  Pulmonary/Chest: Effort normal and breath sounds normal    Abdominal: Soft  Bowel sounds are normal  She exhibits no mass  Musculoskeletal: Normal range of motion  Lymphadenopathy:     She has no cervical adenopathy  Neurological: She is alert and oriented to person, place, and time  She has normal reflexes  Skin: Skin is dry  Psychiatric: She has a normal mood and affect  Her behavior is normal  Judgment and thought content normal    Nursing note and vitals reviewed

## 2018-03-26 NOTE — PATIENT INSTRUCTIONS
Obesity   WHAT YOU NEED TO KNOW:   What is obesity? Obesity is when your body mass index (BMI) is greater than 30  Your healthcare provider will use your height and weight to measure your BMI  What are the risks of obesity? Obesity can cause many health problems, including injuries or physical disability  You may need tests to check for the following:  · Diabetes     · High blood pressure or high cholesterol     · Heart disease     · Gallbladder or liver disease     · Cancer of the colon, breast, prostate, liver, or kidney     · Sleep apnea     · Arthritis or gout  How is obesity treated? The goal of treatment is to help you lose weight so your health will improve  Even a small decrease in BMI can reduce the risk for many health problems  Your healthcare provider will help you set a weight-loss goal   · Lifestyle changes  are the first step in treating obesity  These include making healthy food choices and getting regular physical activity  Your healthcare provider may suggest a weight-loss program that involves coaching, education, and therapy  · Medicine  may help you lose weight when it is used with a healthy diet and physical activity  · Surgery  can help you lose weight if you are very obese and have other health problems  There are several types of weight-loss surgery  Ask your healthcare provider for more information  How can I be successful at losing weight? · Set small, realistic goals  An example of a small goal is to walk for 20 minutes 5 days a week  Anther goal is to lose 5% of your body weight  · Tell friends, family members, and coworkers about your goals  and ask for their support  Ask a friend to lose weight with you, or join a weight-loss support group  · Identify foods or triggers that may cause you to overeat , and find ways to avoid them  Remove tempting high-calorie foods from your home and workplace  Place a bowl of fresh fruit on your kitchen counter   If stress causes you to eat, then find other ways to cope with stress  · Keep a diary to track what you eat and drink  Also write down how many minutes of physical activity you do each day  Weigh yourself once a week and record it in your diary  What eating changes should I make? You will need to eat 500 to 1,000 fewer calories each day than you currently eat to lose 1 to 2 pounds a week  The following changes will help you cut calories:  · Eat smaller portions  Use small plates, no larger than 9 inches in diameter  Fill your plate half full of fruits and vegetables  Measure your food using measuring cups until you know what a serving size looks like  · Eat 3 meals and 1 or 2 snacks each day  Plan your meals in advance  Sonny Acevedo and eat at home most of the time  Eat slowly  · Eat fruits and vegetables at every meal   They are low in calories and high in fiber, which makes you feel full  Do not add butter, margarine, or cream sauce to vegetables  Use herbs to season steamed vegetables  · Eat less fat and fewer fried foods  Eat more baked or grilled chicken and fish  These protein sources are lower in calories and fat than red meat  Limit fast food  Dress your salads with olive oil and vinegar instead of bottled dressing  · Limit the amount of sugar you eat  Do not drink sugary beverages  Limit alcohol  What activity changes should I make? Physical activity is good for your body in many ways  It helps you burn calories and build strong muscles  It decreases stress and depression, and improves your mood  It can also help you sleep better  Talk to your healthcare provider before you begin an exercise program   · Exercise for at least 30 minutes 5 days a week  Start slowly  Set aside time each day for physical activity that you enjoy and that is convenient for you  It is best to do both weight training and an activity that increases your heart rate, such as walking, bicycling, or swimming       · Find ways to be more active  Do yard work and housecleaning  Walk up the stairs instead of using elevators  Spend your leisure time going to events that require walking, such as outdoor festivals or fairs  This extra physical activity can help you lose weight and keep it off  When should I seek immediate care? · You have a severe headache, confusion, or difficulty speaking  · You have weakness on one side of your body  · You have chest pain, sweating, or shortness of breath  When should I contact my healthcare provider? · You have symptoms of gallbladder or liver disease, such as pain in your upper abdomen  · You have knee or hip pain and discomfort while walking  · You have symptoms of diabetes, such as intense hunger and thirst, and frequent urination  · You have symptoms of sleep apnea, such as snoring or daytime sleepiness  · You have questions or concerns about your condition or care  CARE AGREEMENT:   You have the right to help plan your care  Learn about your health condition and how it may be treated  Discuss treatment options with your caregivers to decide what care you want to receive  You always have the right to refuse treatment  The above information is an  only  It is not intended as medical advice for individual conditions or treatments  Talk to your doctor, nurse or pharmacist before following any medical regimen to see if it is safe and effective for you  © 2017 [employer] [employer] Information is for End User's use only and may not be sold, redistributed or otherwise used for commercial purposes  All illustrations and images included in CareNotes® are the copyrighted property of [employer]  or [employer] Rios

## 2018-07-20 ENCOUNTER — HOSPITAL ENCOUNTER (EMERGENCY)
Facility: HOSPITAL | Age: 33
Discharge: HOME | End: 2018-07-20
Attending: EMERGENCY MEDICINE | Admitting: EMERGENCY MEDICINE
Payer: COMMERCIAL

## 2018-07-20 VITALS
BODY MASS INDEX: 44.41 KG/M2 | OXYGEN SATURATION: 98 % | HEIGHT: 68 IN | SYSTOLIC BLOOD PRESSURE: 150 MMHG | TEMPERATURE: 98.3 F | RESPIRATION RATE: 18 BRPM | DIASTOLIC BLOOD PRESSURE: 101 MMHG | WEIGHT: 293 LBS | HEART RATE: 76 BPM

## 2018-07-20 DIAGNOSIS — I83.92 SPIDER VEIN OF LEFT LOWER EXTREMITY: Primary | ICD-10-CM

## 2018-07-20 PROCEDURE — 99283 EMERGENCY DEPT VISIT LOW MDM: CPT

## 2018-07-20 NOTE — ED PROVIDER NOTES
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 Taking? buPROPion (WELLBUTRIN XL) 150 mg 24 hr tablet   No No   Sig: Take 1 tablet (150 mg total) by mouth daily   lisinopril (ZESTRIL) 10 mg tablet   Yes No   multivitamin (THERAGRAN) TABS   Yes No   Sig: Take 1 tablet by mouth daily      Facility-Administered Medications: None       Past Medical History:   Diagnosis Date    Anxiety     PCOS (polycystic ovarian syndrome)        History reviewed  No pertinent surgical history  Family History   Problem Relation Age of Onset   Melba Stallings Breast cancer Mother     Alcohol abuse Father     Heart failure Father     Breast cancer Maternal Grandmother     Diabetes Paternal Grandmother     Bipolar disorder Paternal Aunt     Alcohol abuse Paternal Aunt      I have reviewed and agree with the history as documented  Social History   Substance Use Topics    Smoking status: Never Smoker    Smokeless tobacco: Never Used    Alcohol use Yes      Comment: Social        Review of Systems   Constitutional: Negative for chills and fever  Cardiovascular: Negative for leg swelling     Skin: Negative for pallor, rash and wound  Allergic/Immunologic: Negative for immunocompromised state  Physical Exam  Physical Exam   Constitutional: She appears well-developed and well-nourished  Musculoskeletal: Normal range of motion  Small spider vein the left lateral lower extremity  No swelling  No erythema  She is neurovascularly intact distally  The left leg is no bigger than the right leg  Skin: Skin is warm and dry  Capillary refill takes less than 2 seconds  Psychiatric: She has a normal mood and affect  Nursing note and vitals reviewed        Vital Signs  ED Triage Vitals [07/20/18 1724]   Temperature Pulse Respirations Blood Pressure SpO2   98 3 °F (36 8 °C) 76 18 (!) 150/101 98 %      Temp Source Heart Rate Source Patient Position - Orthostatic VS BP Location FiO2 (%)   Oral Monitor Sitting Right arm --      Pain Score       --           Vitals:    07/20/18 1724   BP: (!) 150/101   Pulse: 76   Patient Position - Orthostatic VS: Sitting       Visual Acuity      ED Medications  Medications - No data to display    Diagnostic Studies  Results Reviewed     None                 No orders to display              Procedures  Procedures       Phone Contacts  ED Phone Contact    ED Course                               MDM  Number of Diagnoses or Management Options  Spider vein of left lower extremity: minor  Risk of Complications, Morbidity, and/or Mortality  Presenting problems: minimal  Management options: minimal    Patient Progress  Patient progress: stable    CritCare Time    Disposition  Final diagnoses:   Spider vein of left lower extremity     Time reflects when diagnosis was documented in both MDM as applicable and the Disposition within this note     Time User Action Codes Description Comment    7/20/2018  5:35 PM Aida Keith Add [I25 12] Spider vein of left lower extremity       ED Disposition     ED Disposition Condition Comment    Discharge  Jordan Garcia discharge to home/self care     Condition at discharge: Stable        Follow-up Information     Follow up With Specialties Details Why 19 Nancy Mc MD Internal Medicine  As needed [address] [place] [address]  [phone]            Discharge Medication List as of 7/20/2018  5:36 PM      CONTINUE these medications which have NOT CHANGED    Details   buPROPion (WELLBUTRIN XL) 150 mg 24 hr tablet Take 1 tablet (150 mg total) by mouth daily, Starting Mon 3/26/2018, Normal      lisinopril (ZESTRIL) 10 mg tablet Starting Fri 3/16/2018, Historical Med      multivitamin (THERAGRAN) TABS Take 1 tablet by mouth daily, Historical Med           No discharge procedures on file      ED Provider  Electronically Signed by           Nupur Chen DO  07/20/18 0202

## 2018-09-14 ENCOUNTER — OFFICE VISIT (OUTPATIENT)
Dept: OBGYN CLINIC | Age: 33
End: 2018-09-14
Payer: COMMERCIAL

## 2018-09-14 ENCOUNTER — TELEPHONE (OUTPATIENT)
Dept: FAMILY MEDICINE CLINIC | Facility: CLINIC | Age: 33
End: 2018-09-14

## 2018-09-14 VITALS
DIASTOLIC BLOOD PRESSURE: 74 MMHG | BODY MASS INDEX: 41.95 KG/M2 | SYSTOLIC BLOOD PRESSURE: 152 MMHG | WEIGHT: 293 LBS | HEIGHT: 70 IN

## 2018-09-14 DIAGNOSIS — E28.2 PCOS (POLYCYSTIC OVARIAN SYNDROME): ICD-10-CM

## 2018-09-14 DIAGNOSIS — Z01.411 ENCOUNTER FOR GYNECOLOGICAL EXAMINATION WITH ABNORMAL FINDING: Primary | ICD-10-CM

## 2018-09-14 PROBLEM — Z13.6 SCREENING FOR CARDIOVASCULAR CONDITION: Status: RESOLVED | Noted: 2018-03-06 | Resolved: 2018-09-14

## 2018-09-14 PROCEDURE — S0610 ANNUAL GYNECOLOGICAL EXAMINA: HCPCS | Performed by: NURSE PRACTITIONER

## 2018-09-14 PROCEDURE — 87624 HPV HI-RISK TYP POOLED RSLT: CPT | Performed by: NURSE PRACTITIONER

## 2018-09-14 PROCEDURE — G0143 SCR C/V CYTO,THINLAYER,RESCR: HCPCS | Performed by: NURSE PRACTITIONER

## 2018-09-14 RX ORDER — METOPROLOL SUCCINATE 50 MG/1
50 TABLET, EXTENDED RELEASE ORAL DAILY
COMMUNITY
Start: 2018-08-28

## 2018-09-14 RX ORDER — MELATONIN
1000 DAILY
COMMUNITY

## 2018-09-14 RX ORDER — METFORMIN HYDROCHLORIDE 500 MG/1
500 TABLET, EXTENDED RELEASE ORAL
Qty: 30 TABLET | Refills: 3 | Status: SHIPPED | OUTPATIENT
Start: 2018-09-14 | End: 2019-01-14 | Stop reason: SDUPTHER

## 2018-09-14 RX ORDER — MEDROXYPROGESTERONE ACETATE 10 MG/1
10 TABLET ORAL DAILY
Qty: 10 TABLET | Refills: 3 | Status: SHIPPED | OUTPATIENT
Start: 2018-09-14 | End: 2019-10-25

## 2018-09-14 NOTE — PROGRESS NOTES
Assessment/Plan:    No problem-specific Assessment & Plan notes found for this encounter  Diagnoses and all orders for this visit:    Encounter for gynecological examination with abnormal finding    PCOS (polycystic ovarian syndrome)    Other orders  -     metoprolol succinate (TOPROL-XL) 50 mg 24 hr tablet;   -     magnesium 30 MG tablet; Take 30 mg by mouth 2 (two) times a day  -     cholecalciferol (VITAMIN D3) 1,000 units tablet; Take 1,000 Units by mouth daily      Call as needed, encouraged calcium/vit D in her diet, all questions answered      Subjective:      Patient ID: Jeoffrey Kehr is a 35 y o  female  Pleasant 35 y o  premenopausal female here for annual exam  She has hx of PCOS and is interested in starting Provera and Glucophage to help with this  Only 2 cycles this past year  She has also decided to see  Weight Loss Program and has an appt on 11/2  She is a [employer]  Denies history of abnormal pap smears, last Pap 10 yrs ago, pap today  Denies vaginal issues  Denies pelvic pain  Hx of HTN          The following portions of the patient's history were reviewed and updated as appropriate:   She  has a past medical history of Anxiety and PCOS (polycystic ovarian syndrome)  She   Patient Active Problem List    Diagnosis Date Noted    Generalized anxiety disorder 03/06/2018    PCOS (polycystic ovarian syndrome) 03/06/2018    Hyperglycemia 03/06/2018    Morbid obesity ([address] ) 03/06/2018     She  has a past surgical history that includes No past surgeries  Her family history includes Alcohol abuse in her father and paternal aunt; Bipolar disorder in her paternal aunt; Breast cancer in her maternal grandmother and mother; Colon cancer in her paternal uncle; Diabetes in her paternal grandmother; Heart failure in her father  She  reports that she has never smoked  She has never used smokeless tobacco  She reports that she drinks alcohol  She reports that she does not use drugs    Current Outpatient Prescriptions   Medication Sig Dispense Refill    cholecalciferol (VITAMIN D3) 1,000 units tablet Take 1,000 Units by mouth daily      lisinopril (ZESTRIL) 10 mg tablet       magnesium 30 MG tablet Take 30 mg by mouth 2 (two) times a day      metoprolol succinate (TOPROL-XL) 50 mg 24 hr tablet       multivitamin (THERAGRAN) TABS Take 1 tablet by mouth daily       No current facility-administered medications for this visit  She has No Known Allergies  OB History    Para Term  AB Living   0 0 0 0 0 0   SAB TAB Ectopic Multiple Live Births   0 0 0 0 0            AT [facility]    Review of Systems      Objective:      /74 (BP Location: Right arm, Patient Position: Sitting)   Ht 5' 10" (1 778 m)   Wt 136 kg (299 lb)   LMP 2018   Breastfeeding? No   BMI 42 90 kg/m²          Physical Exam    Review of Systems   Constitutional: Negative for chills, fatigue, fever and unexpected weight change  Respiratory: Negative for shortness of breath  Gastrointestinal: Negative for anal bleeding, blood in stool, constipation and diarrhea  Genitourinary: Negative for difficulty urinating, dysuria and hematuria  Physical Exam   Constitutional: She appears well-developed and well-nourished  No distress  HENT:   Head: Normocephalic  Neck: Normal range of motion  Neck supple  Pulmonary: Effort normal   Breasts: bilateral without masses, skin changes or nipple discharge  Bilaterally soft and warm to touch  No areas of erythema or pain  Abdominal: Soft  Pelvic exam was performed with patient supine  No labial fusion  There is no rash, tenderness, lesion or injury on the right labia  There is no rash, tenderness, lesion or injury on the left labia  Uterus is not deviated, not enlarged, not fixed and not tender  Cervix exhibits no motion tenderness, no discharge and no friability  Right adnexum displays no mass, no tenderness and no fullness   Left adnexum displays no mass, no tenderness and no fullness  No erythema or tenderness in the vagina  No foreign body in the vagina  No signs of injury around the vagina  No vaginal discharge found  Lymphadenopathy:        Right: No inguinal adenopathy present  Left: No inguinal adenopathy present  LIMITED EXAM D/T BODY HABITUS

## 2018-09-14 NOTE — PATIENT INSTRUCTIONS
Polycystic Ovarian Syndrome   WHAT YOU NEED TO KNOW:   What is polycystic ovarian syndrome? Polycystic ovarian syndrome (PCOS) is a hormone disorder that may cause cysts to form on your ovaries  Cysts are bumps that are filled with fluid  The cysts can prevent your ovaries from working correctly  What causes PCOS? The exact cause of PCOS is not known  It is believed that increased insulin levels may cause the ovaries to produce higher than normal amounts of male hormones  Insulin is produced in the pancreas and helps your body use sugar  Your risk may be increased if you have a family member with PCOS or other ovarian disease  What are the signs and symptoms of PCOS? · Irregular or absent monthly periods    · Increased hair growth on the face, chest, around the nipples, or lower abdomen    · Thinning of scalp hair    · Weight gain and fatigue    · High blood sugar levels or high blood pressure    · Infertility (problem getting pregnant)    · Acne, darkening of the skin, or skin tags    · Pelvic or abdominal pain  How is PCOS diagnosed? Your healthcare provider will ask about your symptoms and when they began  He will ask if you have any family members with PCOS  He may ask about your menstrual history, pregnancies, and medicines  You may also have one or more of the following tests:  · Blood tests: These can test your hormone and blood sugar levels  · Pelvic exam:  This exam lets your healthcare provider check the size and shape of your uterus, cervix, and ovaries  · Vaginal ultrasound: An ultrasound uses sound waves to show pictures of your ovaries on a monitor so your healthcare provider can check for cysts  A small tube is placed into your vagina  How is PCOS treated? · Medicines:      ¨ Birth control pills: These medicines have female hormones, and may decrease male hormone levels  Birth control pills may control your periods, prevent cysts, or cause them to shrink   They also help decrease your risk of endometrial cancer and correct abnormal bleeding  ¨ Hypoglycemic medicines: These help to lower your blood sugar levels and decrease insulin resistance  They are also used to lower male hormone levels and help you ovulate  ¨ Antiandrogen medicines: These may help decrease male hormone levels, excess hair growth, and thinning scalp hair  ¨ Steroids: These may help lower the release of male hormones  ¨ NSAIDs:  These medicines decrease swelling and pain  You can buy NSAIDs without a doctor's order  Ask your healthcare provider which medicine is right for you, and how much to take  Take as directed  NSAIDs can cause stomach bleeding or kidney problems if not taken correctly  · Surgery: Your healthcare provider may do surgery to see your ovaries or to take a biopsy (tissue sample)  He may remove cysts or part of your ovaries  What are the risks of PCOS? You may get an infection or bleed too much after surgery to remove the cysts on your ovaries  Even with treatment, PCOS may return or get worse  PCOS may increase your risk of diabetes, high blood pressure, or heart disease  PCOS may decrease your chances of getting pregnant  Problems with your ovulation may further lead to abnormal uterine bleeding or endometrial cancer  How can I manage my symptoms? · Manage your blood sugar and blood pressure: Your healthcare provider may want you to check your blood sugar levels and blood pressure at home  Keep a record and bring this to your follow-up visits  Blood sugar is measured with a glucose monitor  The monitor tests a small drop of blood  Blood pressure is measured with a cuff that you put on your arm and tighten  Ask for more information on how to measure your blood sugar and blood pressure  · Maintain a healthy weight:  Ask your healthcare provider how much you should weigh  Ask him to help you create a weight loss plan if you are overweight   Weight loss may help reduce the complications of PCOS  · Exercise:  Ask your healthcare provider about the best exercise plan for you  Exercise can help decrease blood sugar and blood pressure  It may also help with weight loss  · Eat a variety of healthy foods:  Healthy foods include fruits, vegetables, whole-grain breads, low-fat dairy products, beans, lean meats, and fish  A dietitian may help you plan meals that are lower in carbohydrates to help you manage your blood sugar levels  Too much carbohydrate at one time can raise your blood sugar to a high level  Where can I find more information? · [facility] [employer] [facility]   O  [address]  Phone: [phone]  Phone: [phone]  Web Address: http://[employer]/  org  · The [address] [place] [address]  Riya Contreras [place] [address]  Web Address: www hormone  org  When should I contact my healthcare provider? · You have a fever  · You feel weak or tired  · You have pain during sex  · Your pain is worse or does not go away after you take your pain medicine  · You have trouble urinating or emptying your bladder completely  · You have questions or concerns about your condition or care  When should I seek immediate care or call 911? · You have a severe headache or feel dizzy  · You vomit multiple times and cannot keep food or liquids down  · You have blurred or double vision  · Your breath has a fruity sweet smell, or you feel short of breath  · You have severe lower abdominal or pelvic pain  CARE AGREEMENT:   You have the right to help plan your care  Learn about your health condition and how it may be treated  Discuss treatment options with your caregivers to decide what care you want to receive  You always have the right to refuse treatment  The above information is an  only  It is not intended as medical advice for individual conditions or treatments   Talk to your doctor, nurse or pharmacist before following any medical regimen to see if it is safe and effective for you  © 2017 [employer] Information is for End User's use only and may not be sold, redistributed or otherwise used for commercial purposes  All illustrations and images included in CareNotes® are the copyrighted property of A D A M , Inc  or Jeff Rios

## 2018-09-14 NOTE — TELEPHONE ENCOUNTER
Pt wanted to let you know that she did f/u with CYNTHIA Arguelles from the [facility] office location

## 2018-09-18 LAB
HPV HR 12 DNA CVX QL NAA+PROBE: NEGATIVE
HPV16 DNA CVX QL NAA+PROBE: NEGATIVE
HPV18 DNA CVX QL NAA+PROBE: NEGATIVE
LAB AP GYN PRIMARY INTERPRETATION: NORMAL
Lab: NORMAL

## 2018-11-15 ENCOUNTER — OFFICE VISIT (OUTPATIENT)
Dept: FAMILY MEDICINE CLINIC | Facility: CLINIC | Age: 33
End: 2018-11-15
Payer: COMMERCIAL

## 2018-11-15 ENCOUNTER — TELEPHONE (OUTPATIENT)
Dept: FAMILY MEDICINE CLINIC | Facility: CLINIC | Age: 33
End: 2018-11-15

## 2018-11-15 VITALS
HEART RATE: 67 BPM | TEMPERATURE: 98.4 F | SYSTOLIC BLOOD PRESSURE: 134 MMHG | OXYGEN SATURATION: 97 % | WEIGHT: 293 LBS | BODY MASS INDEX: 41.95 KG/M2 | DIASTOLIC BLOOD PRESSURE: 88 MMHG | HEIGHT: 70 IN

## 2018-11-15 DIAGNOSIS — M54.32 SCIATICA OF LEFT SIDE: Primary | ICD-10-CM

## 2018-11-15 DIAGNOSIS — Z23 NEED FOR TUBERCULOSIS VACCINATION: ICD-10-CM

## 2018-11-15 PROBLEM — I10 ESSENTIAL HYPERTENSION: Status: ACTIVE | Noted: 2018-11-09

## 2018-11-15 PROBLEM — E78.2 MIXED HYPERLIPIDEMIA: Status: ACTIVE | Noted: 2018-11-09

## 2018-11-15 PROBLEM — E55.9 VITAMIN D DEFICIENCY: Status: ACTIVE | Noted: 2018-11-09

## 2018-11-15 PROCEDURE — 99213 OFFICE O/P EST LOW 20 MIN: CPT | Performed by: PHYSICIAN ASSISTANT

## 2018-11-15 PROCEDURE — 3008F BODY MASS INDEX DOCD: CPT | Performed by: PHYSICIAN ASSISTANT

## 2018-11-15 PROCEDURE — 86580 TB INTRADERMAL TEST: CPT

## 2018-11-15 RX ORDER — CYCLOBENZAPRINE HCL 10 MG
10 TABLET ORAL 3 TIMES DAILY PRN
Qty: 30 TABLET | Refills: 0 | Status: SHIPPED | OUTPATIENT
Start: 2018-11-15 | End: 2019-01-02 | Stop reason: ALTCHOICE

## 2018-11-15 RX ORDER — IBUPROFEN 600 MG/1
600 TABLET ORAL EVERY 6 HOURS PRN
Qty: 90 TABLET | Refills: 1 | Status: SHIPPED | OUTPATIENT
Start: 2018-11-15 | End: 2019-01-02 | Stop reason: ALTCHOICE

## 2018-11-15 NOTE — TELEPHONE ENCOUNTER
Called pt insurance and spoke with HERBERTH Ramos and verify that pt policy is active with an effective date of 12/24/16    SCOTT

## 2018-11-15 NOTE — PROGRESS NOTES
Assessment/Plan:       Diagnoses and all orders for this visit:    Sciatica of left side  -     cyclobenzaprine (FLEXERIL) 10 mg tablet; Take 1 tablet (10 mg total) by mouth 3 (three) times a day as needed for muscle spasms  -     ibuprofen (MOTRIN) 600 mg tablet; Take 1 tablet (600 mg total) by mouth every 6 (six) hours as needed for mild pain    Need for tuberculosis vaccination  -     TB Skin Test            Subjective:      Patient ID: Janelle Joshi is a 35 y o  female  Patient is here for several reasons  One she needs a PPD placed  She understands that we cannot read it since it needs to be done on Saturday and she will go to urgent care to have it read  Two patient is noting off and on sciatica  She has had this problem for several years but it usually goes away within a day or 2  This episode is been lasting for about a week  She did get some exercises for her back from a friend who is a physical therapist   She has not started them yet  She states the pain starts in her left lower back and goes down her posterior thigh to the knee and at times to the foot  Once or twice she has noted some tingling in her foot but not on a regular basis  She denies any weakness of the left leg  She notes it is worse when she is standing  The following portions of the patient's history were reviewed and updated as appropriate:   She has a past medical history of Anxiety and PCOS (polycystic ovarian syndrome)  ,   does not have any pertinent problems on file  ,   has a past surgical history that includes No past surgeries  ,  family history includes Alcohol abuse in her father and paternal aunt; Bipolar disorder in her paternal aunt; Breast cancer in her maternal grandmother and mother; Colon cancer in her paternal uncle; Diabetes in her paternal grandmother; Heart failure in her father  ,   reports that she has never smoked  She has never used smokeless tobacco  She reports that she drinks alcohol   She reports that she does not use drugs  ,  has No Known Allergies     Current Outpatient Prescriptions   Medication Sig Dispense Refill    cholecalciferol (VITAMIN D3) 1,000 units tablet Take 1,000 Units by mouth daily      lisinopril (ZESTRIL) 10 mg tablet       magnesium 30 MG tablet Take 30 mg by mouth 2 (two) times a day      metFORMIN (GLUCOPHAGE-XR) 500 mg 24 hr tablet Take 1 tablet (500 mg total) by mouth daily with breakfast 30 tablet 3    metoprolol succinate (TOPROL-XL) 50 mg 24 hr tablet       multivitamin (THERAGRAN) TABS Take 1 tablet by mouth daily      cyclobenzaprine (FLEXERIL) 10 mg tablet Take 1 tablet (10 mg total) by mouth 3 (three) times a day as needed for muscle spasms 30 tablet 0    ibuprofen (MOTRIN) 600 mg tablet Take 1 tablet (600 mg total) by mouth every 6 (six) hours as needed for mild pain 90 tablet 1    medroxyPROGESTERone (PROVERA) 10 mg tablet Take 1 tablet (10 mg total) by mouth daily for 10 days 10 tablet 3     No current facility-administered medications for this visit  Review of Systems   Constitutional: Negative for activity change and fever  Gastrointestinal: Negative for abdominal pain  Genitourinary: Negative for difficulty urinating  Musculoskeletal: Positive for back pain  Neurological: Positive for numbness  Negative for dizziness, weakness and light-headedness  Objective:  Vitals:    11/15/18 0846   BP: 134/88   Pulse: 67   Temp: 98 4 °F (36 9 °C)   SpO2: 97%   Weight: 135 kg (297 lb 12 8 oz)   Height: 5' 10" (1 778 m)     Body mass index is 42 73 kg/m²  Physical Exam   Constitutional: She is oriented to person, place, and time  She appears well-developed and well-nourished  HENT:   Head: Normocephalic and atraumatic  Right Ear: External ear normal    Left Ear: External ear normal    Eyes: Conjunctivae are normal    Neck: Normal range of motion  Neck supple  Carotid bruit is not present     Cardiovascular: Normal rate, regular rhythm and normal heart sounds  Pulmonary/Chest: Effort normal and breath sounds normal    Musculoskeletal: Normal range of motion  She exhibits no edema or tenderness  Neurological: She is alert and oriented to person, place, and time  She has normal reflexes  Nursing note and vitals reviewed

## 2018-11-15 NOTE — PATIENT INSTRUCTIONS
Back Pain   WHAT YOU NEED TO KNOW:   What should I know about back pain? Back pain is common  You may feel sore or stiff on one or both sides of your back  The pain may spread to your buttocks or thighs  What causes or increases my risk for back pain? Conditions that affect the spine, joints, or muscles can cause back pain  These may include arthritis, spinal stenosis (narrowing of the spinal column), muscle tension, or breakdown of the spinal discs  The following increase your risk of back pain:  · Repeated bending, lifting, or twisting, or lifting heavy items    · Injury from a fall or accident    · Lack of regular physical activity     · Obesity, pregnancy     · Smoking    · Aging    · Driving, sitting, or standing for long periods    · Bad posture while sitting or standing  How is back pain diagnosed? Your healthcare provider will ask if you have any medical conditions  He may ask if you have a history of back pain and how it started  He may watch you stand and walk, and check your range of motion  Show him where you feel pain and what makes it better or worse  Describe the pain, how bad it is, and how long it lasts  Tell him if your pain worsens at night or when you lie on your back  How is back pain treated? · NSAIDs  help decrease swelling and pain  This medicine is available with or without a doctor's order  NSAIDs can cause stomach bleeding or kidney problems in certain people  If you take blood thinner medicine, always ask your healthcare provider if NSAIDs are safe for you  Always read the medicine label and follow directions  · Acetaminophen  decreases pain  It is available without a doctor's order  Ask how much to take and how often to take it  Follow directions  Acetaminophen can cause liver damage if not taken correctly  · Prescription pain medicine  may be given  Ask your healthcare provider how to take this medicine safely  How do I manage my back pain?    · Apply ice  on your back or affected area for 15 to 20 minutes every hour or as directed  Use an ice pack, or put crushed ice in a plastic bag  Cover it with a towel  Ice helps prevent tissue damage and decreases pain  · Apply heat  on your back or affected area for 20 to 30 minutes every 2 hours for as many days as directed  Heat helps decrease pain and muscle spasms  · Stay active  as much as you can without causing more pain  Bed rest could make your back pain worse  Avoid heavy lifting until your pain is gone  When should I contact my healthcare provider? · You have back pain that does not get better with rest and pain medicine  · You have a fever  · You have pain that worsens when you are on your back or when you rest     · You have pain that worsens when you cough or sneeze  · You lose weight without trying  · You have questions or concerns about your condition or care  When should I seek immediate care or call 911? · You have pain, numbness, or weakness in one or both legs  · Your pain becomes so severe that you cannot walk  · You cannot control your urine or bowel movements  · You have severe back pain with chest pain  · You have severe back pain, nausea, and vomiting  · You have severe back pain that spreads to your side or genital area  CARE AGREEMENT:   You have the right to help plan your care  Learn about your health condition and how it may be treated  Discuss treatment options with your caregivers to decide what care you want to receive  You always have the right to refuse treatment  The above information is an  only  It is not intended as medical advice for individual conditions or treatments  Talk to your doctor, nurse or pharmacist before following any medical regimen to see if it is safe and effective for you  © 2017 [employer] Information is for End User's use only and may not be sold, redistributed or otherwise used for commercial purposes   All illustrations and images included in CareNotes® are the copyrighted property of A D A M , Inc  or Jeff Rios

## 2018-12-28 RX ORDER — HYDROCHLOROTHIAZIDE 12.5 MG/1
12.5 TABLET ORAL DAILY
Refills: 1 | COMMUNITY
Start: 2018-12-19 | End: 2019-10-25

## 2019-01-02 ENCOUNTER — OFFICE VISIT (OUTPATIENT)
Dept: BARIATRICS | Facility: CLINIC | Age: 34
End: 2019-01-02
Payer: COMMERCIAL

## 2019-01-02 VITALS
TEMPERATURE: 98.3 F | SYSTOLIC BLOOD PRESSURE: 122 MMHG | DIASTOLIC BLOOD PRESSURE: 82 MMHG | HEIGHT: 69 IN | BODY MASS INDEX: 43.25 KG/M2 | WEIGHT: 292 LBS | HEART RATE: 78 BPM

## 2019-01-02 DIAGNOSIS — R73.9 HYPERGLYCEMIA: ICD-10-CM

## 2019-01-02 DIAGNOSIS — E78.2 MIXED HYPERLIPIDEMIA: ICD-10-CM

## 2019-01-02 DIAGNOSIS — E66.01 OBESITY, CLASS III, BMI 40-49.9 (MORBID OBESITY) (HCC): Primary | ICD-10-CM

## 2019-01-02 DIAGNOSIS — E28.2 PCOS (POLYCYSTIC OVARIAN SYNDROME): ICD-10-CM

## 2019-01-02 DIAGNOSIS — I10 ESSENTIAL HYPERTENSION: ICD-10-CM

## 2019-01-02 PROBLEM — E66.813 OBESITY, CLASS III, BMI 40-49.9 (MORBID OBESITY): Status: ACTIVE | Noted: 2018-03-06

## 2019-01-02 PROCEDURE — 99243 OFF/OP CNSLTJ NEW/EST LOW 30: CPT | Performed by: PHYSICIAN ASSISTANT

## 2019-01-02 NOTE — ASSESSMENT & PLAN NOTE
Taking metformin  -should improve with weight loss, dietary, and lifestyle changes  -continue management with prescribing provider

## 2019-01-02 NOTE — PROGRESS NOTES
Assessment/Plan:    Obesity, Class III, BMI 40-49 9 (morbid obesity) ([facility] [address] )  -Discussed options of [facility]-Intensive Lifestyle Intervention Program, Very Low Calorie Diet-VLCD, Conservative Program, Dwayne-En-Y Gastric Bypass and Vertical Sleeve Gastrectomy and the role of weight loss medications   -Initial weight loss goal of 5-10% weight loss for improved health  -Screening labs  Recommend checking lab coverage before having labs drawn   -lipid, fasting insulin, A1c, and tsh (11/9/2018) and cmp (11/26/2018) reviewed and all within acceptable limits except elevated fasting insulin and elevated triglycerides   - STOP BANG-3/8  -Patient is interested in pursuing Conservative Program   -not interested in surgery at this time    -could consider increasing metformin in the future to help with weight loss  Goals:  Food log (ie ) www myfitnesspal com,sparkpeople  com,loseit com,calorieking  com,etc  baritastic  No sugary beverages  At least 64oz of water daily  Increase physical activity by 10 minutes daily  Gradually increase physical activity to a goal of 5 days per week for 30 minutes of MODERATE intensity PLUS 2 days per week of FULL BODY resistance training--10 minute walk per day and increase as tolerated   7768-3033 calories per day  5-10 servings of fruits and vegetables per day    Mixed hyperlipidemia  -should improve with weight loss, dietary, and lifestyle changes      Hyperglycemia  Taking metformin  -should improve with weight loss, dietary, and lifestyle changes  -continue management with prescribing provider      Essential hypertension  Taking metoprolol, hctz and lisinopril  -should improve with weight loss, dietary, and lifestyle changes  -continue management with prescribing provider      PCOS (polycystic ovarian syndrome)  On metformin  -continue management with prescribing provider        Follow up in approximately 2 months with Non-Surgical Physician/Advanced Practitioner        Diagnoses and all orders for this visit:    Obesity, Class III, BMI 40-49 9 (morbid obesity) ([facility])    Mixed hyperlipidemia    Hyperglycemia    Essential hypertension    PCOS (polycystic ovarian syndrome)    Other orders  -     hydrochlorothiazide (HYDRODIURIL) 12 5 mg tablet; Take 12 5 mg by mouth daily          Subjective:   Chief Complaint   Patient presents with    Consult     Pt here for initial MWM consult w/PA  BMI 44 4  Negative stop bang  Patient ID: Kori Knox  is a 35 y o  female with excess weight/obesity here to pursue weight management  Past Medical History:   Diagnosis Date    Anxiety     Anxiety     Hypertension     PCOS (polycystic ovarian syndrome)        HPI:has been seeing [facility] weight management but this is closer to home so wants to start our program -- only went twice was eating more healthy and more protein and staying only to 24 grams of sugar  No juices  Was asked to start food logging but has not done so  Notes she lost 6 pounds since starting their program  notes she used to eat bagels and donuts for breakfast but has since stopped and started eating cottage cheese and grapes  Obesity/Excess Weight:  Severity: Severe  Onset:  Since teenage years    Modifiers: Diet and Exercise, Physician Supervised Weight Loss Program and Commercial Weight Loss Programs-ie  Weight Watchers, Aisha Meeker, Nutrisystem, etc   Contributing factors: Poor Food Choices and Insufficient Physical Activity  Associated symptoms: decreased self esteem, depression and anxiety    Goals:170 lbs   Hydration: 64 ounces of water  Alcohol: yearly      The following portions of the patient's history were reviewed and updated as appropriate: allergies, current medications, past family history, past medical history, past social history, past surgical history and problem list   Review of Systems   HENT: Negative for sore throat  Respiratory: Negative for cough and shortness of breath      Cardiovascular: Negative for chest pain and palpitations  Gastrointestinal: Negative for abdominal pain, constipation, diarrhea, nausea and vomiting  Denies GERD   Endocrine: Negative for cold intolerance and heat intolerance  Genitourinary: Negative for dysuria  Musculoskeletal: Positive for back pain (sciatica )  Negative for arthralgias  Skin: Negative for rash  Neurological: Negative for headaches  Psychiatric/Behavioral: Negative for suicidal ideas (denies HI)  +anxiety- controlled     Denies depression        Objective:    /82 (BP Location: Right arm, Patient Position: Sitting, Cuff Size: Large)   Pulse 78   Temp 98 3 °F (36 8 °C) (Tympanic)   Ht 5' 8 5" (1 74 m)   Wt 132 kg (292 lb)   BMI 43 75 kg/m²     Physical Exam   Nursing note and vitals reviewed  Constitutional   General appearance: Abnormal   well developed and morbidly obese  Eyes No conjunctival pallor  Ears, Nose, Mouth, and Throat Oral mucosa moist    Pulmonary   Respiratory effort: No increased work of breathing or signs of respiratory distress  Auscultation of lungs: Clear to auscultation, equal breath sounds bilaterally, no wheezes, no rales, no rhonci  Cardiovascular   Auscultation of heart: Normal rate and rhythm, normal S1 and S2, without murmurs  Examination of extremities for edema and/or varicosities: Normal   no edema  Abdomen   Abdomen: Abnormal   The abdomen was obese  Bowel sounds were normal  The abdomen was soft and nontender     Musculoskeletal   Gait and station: Normal     Psychiatric   Orientation to person, place and time: Normal     Affect: appropriate

## 2019-01-02 NOTE — PATIENT INSTRUCTIONS
Goals: Food log (ie ) www myfitnesspal com,sparkpeople  com,loseit com,calorieking  com,etc  baritastic  No sugary beverages  At least 64oz of water daily  Increase physical activity by 10 minutes daily   Gradually increase physical activity to a goal of 5 days per week for 30 minutes of MODERATE intensity PLUS 2 days per week of FULL BODY resistance training--10 minute walk per day and increase as tolerated   7592-0205 calories per day  5-10 servings of fruits and vegetables per day

## 2019-01-02 NOTE — ASSESSMENT & PLAN NOTE
-Discussed options of [facility]-Intensive Lifestyle Intervention Program, Very Low Calorie Diet-VLCD, Conservative Program, Dwayne-En-Y Gastric Bypass and Vertical Sleeve Gastrectomy and the role of weight loss medications   -Initial weight loss goal of 5-10% weight loss for improved health  -Screening labs  Recommend checking lab coverage before having labs drawn   -lipid, fasting insulin, A1c, and tsh (11/9/2018) and cmp (11/26/2018) reviewed and all within acceptable limits except elevated fasting insulin and elevated triglycerides   - STOP BANG-3/8  -Patient is interested in pursuing Conservative Program   -not interested in surgery at this time    -could consider increasing metformin in the future to help with weight loss  Goals:  Food log (ie ) www myfitnesspal com,sparkpeople  com,loseit com,calorieking  com,etc  baritastic  No sugary beverages  At least 64oz of water daily  Increase physical activity by 10 minutes daily   Gradually increase physical activity to a goal of 5 days per week for 30 minutes of MODERATE intensity PLUS 2 days per week of FULL BODY resistance training--10 minute walk per day and increase as tolerated   4903-1833 calories per day  5-10 servings of fruits and vegetables per day

## 2019-01-02 NOTE — ASSESSMENT & PLAN NOTE
Taking metoprolol, hctz and lisinopril  -should improve with weight loss, dietary, and lifestyle changes  -continue management with prescribing provider

## 2019-01-14 DIAGNOSIS — E28.2 PCOS (POLYCYSTIC OVARIAN SYNDROME): ICD-10-CM

## 2019-01-14 RX ORDER — METFORMIN HYDROCHLORIDE 500 MG/1
TABLET, EXTENDED RELEASE ORAL
Qty: 30 TABLET | Refills: 3 | Status: SHIPPED | OUTPATIENT
Start: 2019-01-14 | End: 2019-05-25 | Stop reason: SDUPTHER

## 2019-05-25 DIAGNOSIS — E28.2 PCOS (POLYCYSTIC OVARIAN SYNDROME): ICD-10-CM

## 2019-05-28 RX ORDER — METFORMIN HYDROCHLORIDE 500 MG/1
TABLET, EXTENDED RELEASE ORAL
Qty: 30 TABLET | Refills: 3 | Status: SHIPPED | OUTPATIENT
Start: 2019-05-28 | End: 2019-10-10 | Stop reason: SDUPTHER

## 2019-10-10 DIAGNOSIS — E28.2 PCOS (POLYCYSTIC OVARIAN SYNDROME): ICD-10-CM

## 2019-10-11 RX ORDER — METFORMIN HYDROCHLORIDE 500 MG/1
TABLET, EXTENDED RELEASE ORAL
Qty: 30 TABLET | Refills: 0 | Status: SHIPPED | OUTPATIENT
Start: 2019-10-11 | End: 2019-11-04 | Stop reason: SDUPTHER

## 2019-10-25 ENCOUNTER — ANNUAL EXAM (OUTPATIENT)
Dept: OBGYN CLINIC | Age: 34
End: 2019-10-25
Payer: COMMERCIAL

## 2019-10-25 VITALS
WEIGHT: 293 LBS | SYSTOLIC BLOOD PRESSURE: 122 MMHG | DIASTOLIC BLOOD PRESSURE: 62 MMHG | HEIGHT: 70 IN | BODY MASS INDEX: 41.95 KG/M2

## 2019-10-25 DIAGNOSIS — N92.6 IRREGULAR MENSES: ICD-10-CM

## 2019-10-25 DIAGNOSIS — Z01.411 ENCOUNTER FOR GYNECOLOGICAL EXAMINATION WITH ABNORMAL FINDING: Primary | ICD-10-CM

## 2019-10-25 DIAGNOSIS — E28.2 PCOS (POLYCYSTIC OVARIAN SYNDROME): ICD-10-CM

## 2019-10-25 PROCEDURE — S0612 ANNUAL GYNECOLOGICAL EXAMINA: HCPCS | Performed by: NURSE PRACTITIONER

## 2019-10-25 RX ORDER — LISINOPRIL AND HYDROCHLOROTHIAZIDE 20; 12.5 MG/1; MG/1
1 TABLET ORAL DAILY
Refills: 1 | COMMUNITY
Start: 2019-10-15

## 2019-10-25 RX ORDER — DROSPIRENONE AND ETHINYL ESTRADIOL 0.02-3(28)
1 KIT ORAL DAILY
Qty: 28 TABLET | Refills: 2 | Status: SHIPPED | OUTPATIENT
Start: 2019-10-25 | End: 2020-01-10

## 2019-10-25 NOTE — PATIENT INSTRUCTIONS
Birth Control Pills   WHAT YOU NEED TO KNOW:   What are birth control pills? Birth control pills are also called oral contraceptives, or the pill  It is medicine that helps prevent pregnancy  Birth control pills work by preventing ovulation  Ovulation is when the ovaries make and release an egg cell each month  If this egg gets fertilized by sperm, pregnancy occurs  Birth control pills may also help to prevent pregnancy by keeping sperm from fertilizing an egg  What may be done before I can start taking birth control pills? You need to see your healthcare provider to get a prescription  Any of the following may be done before your healthcare provider gives you a prescription:  · Your healthcare provider will ask you about diseases and illnesses you have had in the past  He will check your risk for blood clots, heart conditions, or stroke  He will also check your blood pressure, and may do a breast and pelvic exam  A Pap smear may also be done during the pelvic exam  This is a test to make sure you do not have abnormal changes on your cervix  You may need other tests, such as a urine test, to make sure you are not pregnant  · Your healthcare provider will ask if you take any medicines and if you smoke  Smoking increases your risk for stroke, heart attack, or a blood clot in your lungs  If you smoke, you should not take certain kinds of birth control pills  What are the advantages of birth control pills? When birth control pills are used correctly, the chances of getting pregnant are very low  Birth control pills may help decrease bleeding and pain during your monthly period  They may also help prevent cancer of the uterus and ovaries  What are the disadvantages of birth control pills? You may have sudden changes in your mood or feelings while you take birth control pills  You may have nausea and decreased sex drive  You may have an increased appetite and rapid weight gain   You may also have bleeding in between periods, less frequent periods, vaginal dryness, and breast pain  Birth control pills will not protect you from sexually transmitted infections  Rarely, some birth control pills can increase your risk for a blood clot  This may become life-threatening  What should I do if I decide I want to get pregnant? If you are planning to have a baby, ask your healthcare provider when you may stop taking your birth control pills  It may take some time for you to start ovulating again  Ask your healthcare provider for more information about pregnancy after birth control pills  When should I start taking birth control pills after I have a baby? If you are not breastfeeding, you may start taking birth control pills 3 weeks after you give birth  You may be able to take certain types of birth control pills if you are breastfeeding  These pills can be started from 6 weeks to 6 months after you give birth  Ask your healthcare provider for more information about when to start taking birth control pills after you give birth  When should I contact my healthcare provider? · You have forgotten to take a birth control pill  · You have mood changes, such as depression, since starting birth control pills  · You have nausea or you are vomiting  · You have severe abdominal pain  · You missed a period and have questions or concerns about being pregnant  · You still have bleeding 4 months after taking birth control pills correctly  · You have questions or concerns about your condition or care  When should I seek immediate care or call 911? · Your arm or leg feels warm, tender, and painful  It may look swollen and red  · You feel lightheaded, short of breath, and have chest pain  · You cough up blood      · You have any of the following signs of a stroke:      ¨ Numbness or drooping on one side of your face     ¨ Weakness in an arm or leg    ¨ Confusion or difficulty speaking    ¨ Dizziness, a severe headache, or vision loss    · You have severe pain, numbness, or swelling in your arms or legs  CARE AGREEMENT:   You have the right to help plan your care  Learn about your health condition and how it may be treated  Discuss treatment options with your caregivers to decide what care you want to receive  You always have the right to refuse treatment  The above information is an  only  It is not intended as medical advice for individual conditions or treatments  Talk to your doctor, nurse or pharmacist before following any medical regimen to see if it is safe and effective for you  © 2017 [facility] Information is for End User's use only and may not be sold, redistributed or otherwise used for commercial purposes  All illustrations and images included in CareNotes® are the copyrighted property of A D A M [employer]  or Jeff Rios

## 2019-10-25 NOTE — PROGRESS NOTES
Diagnoses and all orders for this visit:    Encounter for gynecological examination with abnormal finding    PCOS (polycystic ovarian syndrome)  -     drospirenone-ethinyl estradiol (CHEMA) 3-0 02 MG per tablet; Take 1 tablet by mouth daily for 28 days    Irregular menses  -     US pelvis transabdominal only; Future  -     drospirenone-ethinyl estradiol (CHEMA) 3-0 02 MG per tablet; Take 1 tablet by mouth daily for 28 days    Other orders  -     lisinopril-hydrochlorothiazide (PRINZIDE,ZESTORETIC) 20-12 5 MG per tablet; Take 1 tablet by mouth daily          Calcium/vit d inclusion in the diet discussed, call with any issues, SBE reinforced, all concerns addressed  Encouraged diet and exercise  Will take her BP at work and call if high  Pleasant 28 y o  premenopausal female here for annual exam  She denies any issues with bleeding or her menses  Reports irregular cycles x 6 months, she is bleeding every 2 weeks pretty much  Patient has been on daily metformin  Denies history of abnormal pap smears  Last Pap 2018, no pap today  She is a virgin  Denies vaginal issues  Denies pelvic pain  She agrees to start ocp for menses regulation        Past Medical History:   Diagnosis Date    Anxiety     Anxiety     Hypertension     PCOS (polycystic ovarian syndrome)      Past Surgical History:   Procedure Laterality Date    NO PAST SURGERIES       Family History   Problem Relation Age of Onset    Breast cancer Mother     Alcohol abuse Father     Heart failure Father     Heart disease Father     Hypertension Father     Breast cancer Maternal Grandmother     Diabetes Paternal Grandmother     Bipolar disorder Paternal Aunt     Alcohol abuse Paternal [de-identified]     Colon cancer Paternal Uncle     Stroke Paternal Uncle     Ovarian cancer Neg Hx     Uterine cancer Neg Hx     Cervical cancer Neg Hx     Thyroid disease Neg Hx      Social History     Tobacco Use    Smoking status: Never Smoker    Smokeless tobacco: Never Used   Substance Use Topics    Alcohol use: Yes     Frequency: Monthly or less     Comment: Social    Drug use: No       Current Outpatient Medications:     cholecalciferol (VITAMIN D3) 1,000 units tablet, Take 1,000 Units by mouth daily, Disp: , Rfl:     lisinopril-hydrochlorothiazide (PRINZIDE,ZESTORETIC) 20-12 5 MG per tablet, Take 1 tablet by mouth daily, Disp: , Rfl: 1    magnesium 30 MG tablet, Take 30 mg by mouth 2 (two) times a day, Disp: , Rfl:     metFORMIN (GLUCOPHAGE-XR) 500 mg 24 hr tablet, TAKE 1 TABLET BY MOUTH EVERY DAY WITH BREAKFAST, Disp: 30 tablet, Rfl: 0    metoprolol succinate (TOPROL-XL) 50 mg 24 hr tablet, , Disp: , Rfl:     drospirenone-ethinyl estradiol (CHEMA) 3-0 02 MG per tablet, Take 1 tablet by mouth daily for 28 days, Disp: 28 tablet, Rfl: 2  Patient Active Problem List    Diagnosis Date Noted    Irregular menses 10/25/2019    Sciatica of left side 11/15/2018    Essential hypertension 2018    Mixed hyperlipidemia 2018    Vitamin D deficiency 2018    Generalized anxiety disorder 2018    PCOS (polycystic ovarian syndrome) 2018    Hyperglycemia 2018    Obesity, Class III, BMI 40-49 9 (morbid obesity) ([facility] ) 2018       No Known Allergies    OB History    Para Term  AB Living   0 0 0 0 0 0   SAB TAB Ectopic Multiple Live Births   0 0 0 0 0       Vitals:    10/25/19 1104   BP: 122/62   BP Location: Right arm   Patient Position: Sitting   Weight: (!) 139 kg (307 lb)   Height: 5' 10" (1 778 m)     Body mass index is 44 05 kg/m²  Review of Systems   Constitutional: Negative for chills, fatigue, fever and unexpected weight change  Respiratory: Negative for shortness of breath  Gastrointestinal: Negative for anal bleeding, blood in stool, constipation and diarrhea  Genitourinary: Negative for difficulty urinating, dysuria and hematuria       Physical Exam   Constitutional: She appears well-developed and well-nourished  No distress  HENT:   Head: Normocephalic  Neck: Normal range of motion  Neck supple  Pulmonary: Effort normal   Breasts: bilateral without masses, skin changes or nipple discharge  Bilaterally soft and warm to touch  No areas of erythema or pain  Abdominal: Soft  Pelvic exam was performed with patient supine  No labial fusion  There is no rash, tenderness, lesion or injury on the right labia  There is no rash, tenderness, lesion or injury on the left labia  Urethral meatus does not show any tenderness, inflammation or discharge  Palpation of midline bladder without pain or discomfort  Uterus is not deviated, not enlarged, not fixed and not tender  Cervix exhibits no motion tenderness, no discharge and no friability  Right adnexum displays no mass, no tenderness and no fullness  Left adnexum displays no mass, no tenderness and no fullness  No erythema or tenderness in the vagina  No foreign body in the vagina  No signs of injury around the vagina  No vaginal discharge found  No signs of injury around the vagina or anus  Perineum without lesions, signs of injury, erythema or swelling  Lymphadenopathy:        Right: No inguinal adenopathy present  Left: No inguinal adenopathy present       LIMITED EXAM D/T OBESITY

## 2019-11-04 DIAGNOSIS — E28.2 PCOS (POLYCYSTIC OVARIAN SYNDROME): ICD-10-CM

## 2019-11-04 RX ORDER — METFORMIN HYDROCHLORIDE 500 MG/1
TABLET, EXTENDED RELEASE ORAL
Qty: 30 TABLET | Refills: 6 | Status: SHIPPED | OUTPATIENT
Start: 2019-11-04 | End: 2020-03-06 | Stop reason: ALTCHOICE

## 2020-01-10 DIAGNOSIS — E28.2 PCOS (POLYCYSTIC OVARIAN SYNDROME): ICD-10-CM

## 2020-01-10 DIAGNOSIS — N92.6 IRREGULAR MENSES: ICD-10-CM

## 2020-01-23 ENCOUNTER — TELEPHONE (OUTPATIENT)
Dept: OTHER | Facility: HOSPITAL | Age: 35
End: 2020-01-23

## 2020-03-06 ENCOUNTER — OFFICE VISIT (OUTPATIENT)
Dept: FAMILY MEDICINE CLINIC | Facility: CLINIC | Age: 35
End: 2020-03-06
Payer: COMMERCIAL

## 2020-03-06 VITALS
HEART RATE: 108 BPM | TEMPERATURE: 100.3 F | DIASTOLIC BLOOD PRESSURE: 102 MMHG | BODY MASS INDEX: 41.95 KG/M2 | SYSTOLIC BLOOD PRESSURE: 132 MMHG | WEIGHT: 293 LBS | OXYGEN SATURATION: 97 % | HEIGHT: 70 IN

## 2020-03-06 DIAGNOSIS — R68.89 FLU-LIKE SYMPTOMS: Primary | ICD-10-CM

## 2020-03-06 PROCEDURE — 3008F BODY MASS INDEX DOCD: CPT | Performed by: PHYSICIAN ASSISTANT

## 2020-03-06 PROCEDURE — 3080F DIAST BP >= 90 MM HG: CPT | Performed by: PHYSICIAN ASSISTANT

## 2020-03-06 PROCEDURE — 3075F SYST BP GE 130 - 139MM HG: CPT | Performed by: PHYSICIAN ASSISTANT

## 2020-03-06 PROCEDURE — 1036F TOBACCO NON-USER: CPT | Performed by: PHYSICIAN ASSISTANT

## 2020-03-06 PROCEDURE — 99213 OFFICE O/P EST LOW 20 MIN: CPT | Performed by: PHYSICIAN ASSISTANT

## 2020-03-06 RX ORDER — OSELTAMIVIR PHOSPHATE 75 MG/1
75 CAPSULE ORAL 2 TIMES DAILY
Qty: 10 CAPSULE | Refills: 0 | Status: SHIPPED | OUTPATIENT
Start: 2020-03-06 | End: 2020-03-11

## 2020-03-06 NOTE — PROGRESS NOTES
Assessment/Plan:          Diagnoses and all orders for this visit:    Flu-like symptoms  -     oseltamivir (TAMIFLU) 75 mg capsule; Take 1 capsule (75 mg total) by mouth 2 (two) times a day for 5 days        Patient is to alternate Tylenol and ibuprofen to keep her fever down  She is to follow-up if she has any changes  Subjective:      Patient ID: Talisha Feliz is a 29 y o  female  Cough   This is a new problem  The current episode started in the past 7 days  The problem has been unchanged  The problem occurs every few minutes  The cough is non-productive  Associated symptoms include chills, a fever, headaches, nasal congestion and sweats  Pertinent negatives include no chest pain, ear congestion, ear pain, eye redness, myalgias, postnasal drip, rash, rhinorrhea, sore throat, shortness of breath or wheezing  Nothing aggravates the symptoms  She has tried rest for the symptoms  The treatment provided no relief  Headache    This is a new problem  The current episode started in the past 7 days  The problem has been unchanged  The pain is located in the bilateral region  The pain does not radiate  The quality of the pain is described as aching  The pain is moderate  Associated symptoms include coughing, a fever, nausea and vomiting  Pertinent negatives include no abdominal pain, anorexia, back pain, blurred vision, dizziness, ear pain, eye pain, eye redness, loss of balance, muscle aches, neck pain, numbness, rhinorrhea, sinus pressure, sore throat, swollen glands or visual change  The symptoms are aggravated by activity and coughing  She has tried acetaminophen and NSAIDs for the symptoms  The treatment provided moderate relief  Fever   This is a new problem  The current episode started in the past 7 days  The problem occurs 2 to 4 times per day  The problem has been waxing and waning  Associated symptoms include chills, congestion, coughing, fatigue, a fever, headaches, nausea and vomiting   Pertinent negatives include no abdominal pain, anorexia, arthralgias, change in bowel habit, chest pain, joint swelling, myalgias, neck pain, numbness, rash, sore throat, swollen glands, urinary symptoms, vertigo or visual change  Nothing aggravates the symptoms  She has tried acetaminophen and NSAIDs for the symptoms  The treatment provided moderate relief  Patient states her fever has been up to 103 late Wednesday  She also says it has not gone under 100  The following portions of the patient's history were reviewed and updated as appropriate:   She has a past medical history of Anxiety, Anxiety, Hypertension, and PCOS (polycystic ovarian syndrome)  ,  does not have any pertinent problems on file  ,   has a past surgical history that includes No past surgeries  ,  family history includes Alcohol abuse in her father and paternal aunt; Bipolar disorder in her paternal aunt; Breast cancer in her maternal grandmother and mother; Colon cancer in her paternal uncle; Diabetes in her paternal grandmother; Heart disease in her father; Heart failure in her father; Hypertension in her father; Stroke in her paternal uncle ,   reports that she has never smoked  She has never used smokeless tobacco  She reports that she drinks alcohol  She reports that she does not use drugs  ,  has No Known Allergies     Current Outpatient Medications   Medication Sig Dispense Refill    cholecalciferol (VITAMIN D3) 1,000 units tablet Take 1,000 Units by mouth daily      lisinopril-hydrochlorothiazide (PRINZIDE,ZESTORETIC) 20-12 5 MG per tablet Take 1 tablet by mouth daily  1    magnesium 30 MG tablet Take 30 mg by mouth 2 (two) times a day      metoprolol succinate (TOPROL-XL) 50 mg 24 hr tablet       oseltamivir (TAMIFLU) 75 mg capsule Take 1 capsule (75 mg total) by mouth 2 (two) times a day for 5 days 10 capsule 0     No current facility-administered medications for this visit          Review of Systems   Constitutional: Positive for chills, fatigue and fever  HENT: Positive for congestion  Negative for ear pain, facial swelling, postnasal drip, rhinorrhea, sinus pressure, sore throat and trouble swallowing  Eyes: Negative for blurred vision, pain and redness  Respiratory: Positive for cough  Negative for chest tightness, shortness of breath and wheezing  Cardiovascular: Negative for chest pain  Gastrointestinal: Positive for nausea and vomiting  Negative for abdominal pain, anorexia, change in bowel habit, constipation and diarrhea  Genitourinary: Negative for difficulty urinating and dysuria  Musculoskeletal: Negative for arthralgias, back pain, joint swelling, myalgias and neck pain  Skin: Negative for rash  Neurological: Positive for headaches  Negative for dizziness, vertigo, light-headedness, numbness and loss of balance  Objective:  Vitals:    03/06/20 1520   BP: (!) 132/102   Pulse: (!) 108   Temp: 100 3 °F (37 9 °C)   SpO2: 97%   Weight: 135 kg (297 lb)   Height: 5' 10" (1 778 m)     Body mass index is 42 62 kg/m²  Physical Exam   Constitutional: She is oriented to person, place, and time  She appears well-developed and well-nourished  She appears ill  HENT:   Head: Normocephalic  Right Ear: Tympanic membrane, external ear and ear canal normal    Left Ear: Tympanic membrane, external ear and ear canal normal    Nose: Nose normal    Mouth/Throat: Uvula is midline, oropharynx is clear and moist and mucous membranes are normal  No uvula swelling  Eyes: Conjunctivae are normal    Neck: Normal range of motion  Cardiovascular: Regular rhythm and normal heart sounds  Tachycardia present  Pulmonary/Chest: Effort normal and breath sounds normal  She has no wheezes  She has no rales  Musculoskeletal: Normal range of motion  She exhibits no edema  Lymphadenopathy:     She has no cervical adenopathy  Neurological: She is alert and oriented to person, place, and time  Skin: Skin is warm and dry  Psychiatric: She has a normal mood and affect  Her behavior is normal    Nursing note and vitals reviewed

## 2020-10-22 ENCOUNTER — APPOINTMENT (OUTPATIENT)
Dept: LAB | Facility: HOSPITAL | Age: 35
End: 2020-10-22
Payer: COMMERCIAL

## 2020-10-22 ENCOUNTER — OFFICE VISIT (OUTPATIENT)
Dept: FAMILY MEDICINE CLINIC | Facility: CLINIC | Age: 35
End: 2020-10-22
Payer: COMMERCIAL

## 2020-10-22 VITALS
WEIGHT: 293 LBS | SYSTOLIC BLOOD PRESSURE: 128 MMHG | HEIGHT: 70 IN | BODY MASS INDEX: 41.95 KG/M2 | TEMPERATURE: 97.8 F | OXYGEN SATURATION: 96 % | DIASTOLIC BLOOD PRESSURE: 84 MMHG | HEART RATE: 84 BPM

## 2020-10-22 DIAGNOSIS — L83 ACANTHOSIS NIGRICANS: ICD-10-CM

## 2020-10-22 DIAGNOSIS — E78.2 MIXED HYPERLIPIDEMIA: ICD-10-CM

## 2020-10-22 DIAGNOSIS — E28.2 PCOS (POLYCYSTIC OVARIAN SYNDROME): ICD-10-CM

## 2020-10-22 DIAGNOSIS — Z23 ENCOUNTER FOR IMMUNIZATION: ICD-10-CM

## 2020-10-22 DIAGNOSIS — I10 ESSENTIAL HYPERTENSION: Primary | ICD-10-CM

## 2020-10-22 DIAGNOSIS — E55.9 VITAMIN D DEFICIENCY: ICD-10-CM

## 2020-10-22 DIAGNOSIS — F41.1 GENERALIZED ANXIETY DISORDER: ICD-10-CM

## 2020-10-22 DIAGNOSIS — Z00.00 ANNUAL PHYSICAL EXAM: ICD-10-CM

## 2020-10-22 DIAGNOSIS — I10 ESSENTIAL HYPERTENSION: ICD-10-CM

## 2020-10-22 PROBLEM — R68.89 FLU-LIKE SYMPTOMS: Status: RESOLVED | Noted: 2020-03-06 | Resolved: 2020-10-22

## 2020-10-22 LAB
25(OH)D3 SERPL-MCNC: 25 NG/ML (ref 30–100)
ANION GAP SERPL CALCULATED.3IONS-SCNC: 9 MMOL/L (ref 4–13)
BUN SERPL-MCNC: 6 MG/DL (ref 5–25)
CALCIUM SERPL-MCNC: 8.5 MG/DL (ref 8.3–10.1)
CHLORIDE SERPL-SCNC: 105 MMOL/L (ref 100–108)
CHOLEST SERPL-MCNC: 171 MG/DL (ref 50–200)
CO2 SERPL-SCNC: 26 MMOL/L (ref 21–32)
CREAT SERPL-MCNC: 0.84 MG/DL (ref 0.6–1.3)
EST. AVERAGE GLUCOSE BLD GHB EST-MCNC: 134 MG/DL
GFR SERPL CREATININE-BSD FRML MDRD: 90 ML/MIN/1.73SQ M
GLUCOSE P FAST SERPL-MCNC: 136 MG/DL (ref 65–99)
HBA1C MFR BLD: 6.3 %
HDLC SERPL-MCNC: 42 MG/DL
LDLC SERPL CALC-MCNC: 99 MG/DL (ref 0–100)
MAGNESIUM SERPL-MCNC: 2.1 MG/DL (ref 1.6–2.6)
NONHDLC SERPL-MCNC: 129 MG/DL
POTASSIUM SERPL-SCNC: 3.4 MMOL/L (ref 3.5–5.3)
SODIUM SERPL-SCNC: 140 MMOL/L (ref 136–145)
TRIGL SERPL-MCNC: 150 MG/DL
TSH SERPL DL<=0.05 MIU/L-ACNC: 2.95 UIU/ML (ref 0.36–3.74)

## 2020-10-22 PROCEDURE — 3725F SCREEN DEPRESSION PERFORMED: CPT | Performed by: STUDENT IN AN ORGANIZED HEALTH CARE EDUCATION/TRAINING PROGRAM

## 2020-10-22 PROCEDURE — 90471 IMMUNIZATION ADMIN: CPT

## 2020-10-22 PROCEDURE — 3079F DIAST BP 80-89 MM HG: CPT | Performed by: STUDENT IN AN ORGANIZED HEALTH CARE EDUCATION/TRAINING PROGRAM

## 2020-10-22 PROCEDURE — 80048 BASIC METABOLIC PNL TOTAL CA: CPT

## 2020-10-22 PROCEDURE — 83735 ASSAY OF MAGNESIUM: CPT

## 2020-10-22 PROCEDURE — 83036 HEMOGLOBIN GLYCOSYLATED A1C: CPT

## 2020-10-22 PROCEDURE — 84443 ASSAY THYROID STIM HORMONE: CPT

## 2020-10-22 PROCEDURE — 1036F TOBACCO NON-USER: CPT | Performed by: STUDENT IN AN ORGANIZED HEALTH CARE EDUCATION/TRAINING PROGRAM

## 2020-10-22 PROCEDURE — 90715 TDAP VACCINE 7 YRS/> IM: CPT

## 2020-10-22 PROCEDURE — 80061 LIPID PANEL: CPT

## 2020-10-22 PROCEDURE — 99385 PREV VISIT NEW AGE 18-39: CPT | Performed by: STUDENT IN AN ORGANIZED HEALTH CARE EDUCATION/TRAINING PROGRAM

## 2020-10-22 PROCEDURE — 82306 VITAMIN D 25 HYDROXY: CPT

## 2020-10-22 PROCEDURE — 3074F SYST BP LT 130 MM HG: CPT | Performed by: STUDENT IN AN ORGANIZED HEALTH CARE EDUCATION/TRAINING PROGRAM

## 2020-10-22 PROCEDURE — 36415 COLL VENOUS BLD VENIPUNCTURE: CPT

## 2020-12-28 ENCOUNTER — TELEMEDICINE (OUTPATIENT)
Dept: FAMILY MEDICINE CLINIC | Facility: CLINIC | Age: 35
End: 2020-12-28
Payer: COMMERCIAL

## 2020-12-28 DIAGNOSIS — M54.31 SCIATIC PAIN, RIGHT: Primary | ICD-10-CM

## 2020-12-28 PROCEDURE — 3725F SCREEN DEPRESSION PERFORMED: CPT | Performed by: PHYSICIAN ASSISTANT

## 2020-12-28 PROCEDURE — 1036F TOBACCO NON-USER: CPT | Performed by: PHYSICIAN ASSISTANT

## 2020-12-28 PROCEDURE — 99213 OFFICE O/P EST LOW 20 MIN: CPT | Performed by: PHYSICIAN ASSISTANT

## 2020-12-28 RX ORDER — IBUPROFEN 600 MG/1
600 TABLET ORAL EVERY 8 HOURS PRN
Qty: 90 TABLET | Refills: 1 | Status: SHIPPED | OUTPATIENT
Start: 2020-12-28 | End: 2021-03-02

## 2020-12-28 RX ORDER — PROPRANOLOL/HYDROCHLOROTHIAZID 40 MG-25MG
TABLET ORAL 2 TIMES DAILY
COMMUNITY
End: 2022-01-11 | Stop reason: ALTCHOICE

## 2021-02-26 DIAGNOSIS — Z23 ENCOUNTER FOR IMMUNIZATION: ICD-10-CM

## 2021-02-28 ENCOUNTER — IMMUNIZATIONS (OUTPATIENT)
Dept: FAMILY MEDICINE CLINIC | Facility: HOSPITAL | Age: 36
End: 2021-02-28

## 2021-02-28 DIAGNOSIS — Z23 ENCOUNTER FOR IMMUNIZATION: Primary | ICD-10-CM

## 2021-02-28 PROCEDURE — 0001A SARS-COV-2 / COVID-19 MRNA VACCINE (PFIZER-BIONTECH) 30 MCG: CPT

## 2021-02-28 PROCEDURE — 91300 SARS-COV-2 / COVID-19 MRNA VACCINE (PFIZER-BIONTECH) 30 MCG: CPT

## 2021-03-02 DIAGNOSIS — M54.31 SCIATIC PAIN, RIGHT: ICD-10-CM

## 2021-03-02 RX ORDER — IBUPROFEN 600 MG/1
600 TABLET ORAL EVERY 8 HOURS PRN
Qty: 90 TABLET | Refills: 1 | Status: SHIPPED | OUTPATIENT
Start: 2021-03-02 | End: 2022-04-12 | Stop reason: ALTCHOICE

## 2021-03-21 ENCOUNTER — IMMUNIZATIONS (OUTPATIENT)
Dept: FAMILY MEDICINE CLINIC | Facility: HOSPITAL | Age: 36
End: 2021-03-21

## 2021-03-21 DIAGNOSIS — Z23 ENCOUNTER FOR IMMUNIZATION: Primary | ICD-10-CM

## 2021-03-21 PROCEDURE — 91300 SARS-COV-2 / COVID-19 MRNA VACCINE (PFIZER-BIONTECH) 30 MCG: CPT

## 2021-03-21 PROCEDURE — 0002A SARS-COV-2 / COVID-19 MRNA VACCINE (PFIZER-BIONTECH) 30 MCG: CPT

## 2021-10-01 ENCOUNTER — IMMUNIZATIONS (OUTPATIENT)
Dept: FAMILY MEDICINE CLINIC | Facility: HOSPITAL | Age: 36
End: 2021-10-01

## 2021-10-01 DIAGNOSIS — Z23 ENCOUNTER FOR IMMUNIZATION: Primary | ICD-10-CM

## 2021-10-01 PROCEDURE — 0001A SARS-COV-2 / COVID-19 MRNA VACCINE (PFIZER-BIONTECH) 30 MCG: CPT

## 2021-10-01 PROCEDURE — 91300 SARS-COV-2 / COVID-19 MRNA VACCINE (PFIZER-BIONTECH) 30 MCG: CPT

## 2021-12-01 ENCOUNTER — VBI (OUTPATIENT)
Dept: ADMINISTRATIVE | Facility: OTHER | Age: 36
End: 2021-12-01

## 2022-01-07 ENCOUNTER — TELEPHONE (OUTPATIENT)
Dept: DERMATOLOGY | Facility: CLINIC | Age: 37
End: 2022-01-07

## 2022-01-11 ENCOUNTER — OFFICE VISIT (OUTPATIENT)
Dept: FAMILY MEDICINE CLINIC | Facility: CLINIC | Age: 37
End: 2022-01-11
Payer: COMMERCIAL

## 2022-01-11 VITALS
BODY MASS INDEX: 41.95 KG/M2 | WEIGHT: 293 LBS | OXYGEN SATURATION: 98 % | HEART RATE: 107 BPM | HEIGHT: 70 IN | SYSTOLIC BLOOD PRESSURE: 138 MMHG | DIASTOLIC BLOOD PRESSURE: 96 MMHG | TEMPERATURE: 98.3 F

## 2022-01-11 DIAGNOSIS — L03.221 CELLULITIS AND ABSCESS OF NECK: Primary | ICD-10-CM

## 2022-01-11 DIAGNOSIS — L02.11 CELLULITIS AND ABSCESS OF NECK: Primary | ICD-10-CM

## 2022-01-11 PROCEDURE — 99214 OFFICE O/P EST MOD 30 MIN: CPT | Performed by: PHYSICIAN ASSISTANT

## 2022-01-11 PROCEDURE — 3725F SCREEN DEPRESSION PERFORMED: CPT | Performed by: PHYSICIAN ASSISTANT

## 2022-01-11 RX ORDER — POTASSIUM CHLORIDE 750 MG/1
TABLET, FILM COATED, EXTENDED RELEASE ORAL
COMMUNITY
Start: 2021-12-19

## 2022-01-11 RX ORDER — CEPHALEXIN 500 MG/1
500 CAPSULE ORAL EVERY 12 HOURS SCHEDULED
Qty: 20 CAPSULE | Refills: 0 | Status: SHIPPED | OUTPATIENT
Start: 2022-01-11 | End: 2022-01-21

## 2022-01-11 RX ORDER — AMLODIPINE BESYLATE 5 MG/1
TABLET ORAL
COMMUNITY
Start: 2021-12-19

## 2022-01-11 NOTE — PROGRESS NOTES
Assessment/Plan:  BMI Counseling: Body mass index is 45 63 kg/m²  The BMI is above normal  Nutrition recommendations include encouraging healthy choices of fruits and vegetables, consuming healthier snacks, limiting drinks that contain sugar, moderation in carbohydrate intake, increasing intake of lean protein, reducing intake of saturated and trans fat and reducing intake of cholesterol  Exercise recommendations include moderate physical activity 150 minutes/week  No pharmacotherapy was ordered  Rationale for BMI follow-up plan is due to patient being overweight or obese  Depression Screening and Follow-up Plan: Patient was screened for depression during today's encounter  They screened negative with a PHQ-2 score of 1  Diagnoses and all orders for this visit:    Cellulitis and abscess of neck  -     cephalexin (KEFLEX) 500 mg capsule; Take 1 capsule (500 mg total) by mouth every 12 (twelve) hours for 10 days    Other orders  -     potassium chloride (Klor-Con) 10 mEq tablet  -     amLODIPine (NORVASC) 5 mg tablet            Subjective:      Patient ID: Annamarie Ritter is a 39 y o  female  Pt with abscess on back of neck  She states she has had this a while but much smaller  She says cyst began to enlarge and now painful  Today she noticed "a pimple" on top and it drained some pus  The following portions of the patient's history were reviewed and updated as appropriate:   She has a past medical history of Anxiety, Anxiety, Hypertension, Obesity, and PCOS (polycystic ovarian syndrome)  ,  does not have any pertinent problems on file  ,   has a past surgical history that includes No past surgeries  ,  family history includes Alcohol abuse in her father and paternal aunt; Bipolar disorder in her paternal aunt; Breast cancer in her maternal grandmother and mother; Colon cancer in her paternal uncle; Diabetes in her paternal grandmother; Heart disease in her father; Heart failure in her father; Hypertension in her father; Stroke in her paternal uncle ,   reports that she has never smoked  She has never used smokeless tobacco  She reports current alcohol use  She reports that she does not use drugs  ,  has No Known Allergies     Current Outpatient Medications   Medication Sig Dispense Refill    cholecalciferol (VITAMIN D3) 1,000 units tablet Take 1,000 Units by mouth daily      lisinopril-hydrochlorothiazide (PRINZIDE,ZESTORETIC) 20-12 5 MG per tablet Take 1 tablet by mouth daily  1    Magnesium 400 MG CAPS Take 400 mg by mouth daily       metoprolol succinate (TOPROL-XL) 50 mg 24 hr tablet Take 50 mg by mouth daily       amLODIPine (NORVASC) 5 mg tablet       cephalexin (KEFLEX) 500 mg capsule Take 1 capsule (500 mg total) by mouth every 12 (twelve) hours for 10 days 20 capsule 0    ibuprofen (MOTRIN) 600 mg tablet TAKE 1 TABLET (600 MG TOTAL) BY MOUTH EVERY 8 (EIGHT) HOURS AS NEEDED FOR MILD PAIN 90 tablet 1    potassium chloride (Klor-Con) 10 mEq tablet        No current facility-administered medications for this visit  Review of Systems   Constitutional: Negative for fever  Skin: Positive for wound  Objective:  Vitals:    01/11/22 1610   BP: 138/96   Pulse: (!) 107   Temp: 98 3 °F (36 8 °C)   SpO2: 98%   Weight: (!) 144 kg (318 lb)   Height: 5' 10" (1 778 m)     Body mass index is 45 63 kg/m²  Physical Exam  Skin:     Findings: Abscess present  Comments: 3 cm indurated area with pustular drainage       Area cleaned with betadine, 1cc of 2% Lidocaine injected, 10 blade used to incise abscess  Pustular drainage was drained and area dressed

## 2022-01-18 ENCOUNTER — OFFICE VISIT (OUTPATIENT)
Dept: FAMILY MEDICINE CLINIC | Facility: CLINIC | Age: 37
End: 2022-01-18
Payer: COMMERCIAL

## 2022-01-18 VITALS
HEIGHT: 70 IN | TEMPERATURE: 98.1 F | BODY MASS INDEX: 41.95 KG/M2 | SYSTOLIC BLOOD PRESSURE: 128 MMHG | WEIGHT: 293 LBS | HEART RATE: 111 BPM | OXYGEN SATURATION: 97 % | DIASTOLIC BLOOD PRESSURE: 84 MMHG

## 2022-01-18 DIAGNOSIS — L02.11 CELLULITIS AND ABSCESS OF NECK: Primary | ICD-10-CM

## 2022-01-18 DIAGNOSIS — L03.221 CELLULITIS AND ABSCESS OF NECK: Primary | ICD-10-CM

## 2022-01-18 PROCEDURE — 99213 OFFICE O/P EST LOW 20 MIN: CPT | Performed by: PHYSICIAN ASSISTANT

## 2022-01-18 PROCEDURE — 3008F BODY MASS INDEX DOCD: CPT | Performed by: PHYSICIAN ASSISTANT

## 2022-01-18 PROCEDURE — 1036F TOBACCO NON-USER: CPT | Performed by: PHYSICIAN ASSISTANT

## 2022-01-18 NOTE — PROGRESS NOTES
Assessment/Plan:          Diagnoses and all orders for this visit:    Cellulitis and abscess of neck        Pt is to continue antibiotic and finish course  Follow up if needed  Incision to heal in on it's own    Subjective:      Patient ID: Fannie Mata is a 39 y o  female  Pt is here for follow up of I & D of abscess on posterior neck  She is feeling much better and is still having slight drainage  She has noticed significant decrease in size  She is taking Keflex as directed  The following portions of the patient's history were reviewed and updated as appropriate:   She has a past medical history of Anxiety, Anxiety, Hypertension, Obesity, and PCOS (polycystic ovarian syndrome)  ,  does not have any pertinent problems on file  ,   has a past surgical history that includes No past surgeries  ,  family history includes Alcohol abuse in her father and paternal aunt; Bipolar disorder in her paternal aunt; Breast cancer in her maternal grandmother and mother; Colon cancer in her paternal uncle; Diabetes in her paternal grandmother; Heart disease in her father; Heart failure in her father; Hypertension in her father; Stroke in her paternal uncle ,   reports that she has never smoked  She has never used smokeless tobacco  She reports current alcohol use  She reports that she does not use drugs  ,  has No Known Allergies     Current Outpatient Medications   Medication Sig Dispense Refill    amLODIPine (NORVASC) 5 mg tablet       cephalexin (KEFLEX) 500 mg capsule Take 1 capsule (500 mg total) by mouth every 12 (twelve) hours for 10 days 20 capsule 0    cholecalciferol (VITAMIN D3) 1,000 units tablet Take 1,000 Units by mouth daily      ibuprofen (MOTRIN) 600 mg tablet TAKE 1 TABLET (600 MG TOTAL) BY MOUTH EVERY 8 (EIGHT) HOURS AS NEEDED FOR MILD PAIN 90 tablet 1    lisinopril-hydrochlorothiazide (PRINZIDE,ZESTORETIC) 20-12 5 MG per tablet Take 1 tablet by mouth daily  1    Magnesium 400 MG CAPS Take 400 mg by mouth daily       metoprolol succinate (TOPROL-XL) 50 mg 24 hr tablet Take 50 mg by mouth daily       potassium chloride (Klor-Con) 10 mEq tablet        No current facility-administered medications for this visit  Review of Systems   Constitutional: Negative for chills and fever  Skin: Positive for wound  Objective:  Vitals:    01/18/22 0921   BP: 128/84   BP Location: Left arm   Patient Position: Sitting   Cuff Size: Thigh   Pulse: (!) 111   Temp: 98 1 °F (36 7 °C)   TempSrc: Tympanic   SpO2: 97%   Weight: (!) 141 kg (310 lb)   Height: 5' 10" (1 778 m)     Body mass index is 44 48 kg/m²  Physical Exam  Vitals and nursing note reviewed  Constitutional:       Appearance: Normal appearance  HENT:      Head: Normocephalic and atraumatic  Cardiovascular:      Rate and Rhythm: Normal rate  Pulmonary:      Effort: Pulmonary effort is normal    Skin:            Comments: Area without erythema, some pustular drainage noted  Incision still open  Neurological:      Mental Status: She is alert and oriented to person, place, and time     Psychiatric:         Mood and Affect: Mood normal          Behavior: Behavior normal

## 2022-04-11 ENCOUNTER — TELEPHONE (OUTPATIENT)
Dept: FAMILY MEDICINE CLINIC | Facility: CLINIC | Age: 37
End: 2022-04-11

## 2022-04-12 ENCOUNTER — OFFICE VISIT (OUTPATIENT)
Dept: FAMILY MEDICINE CLINIC | Facility: CLINIC | Age: 37
End: 2022-04-12
Payer: COMMERCIAL

## 2022-04-12 VITALS
DIASTOLIC BLOOD PRESSURE: 78 MMHG | TEMPERATURE: 100 F | OXYGEN SATURATION: 97 % | WEIGHT: 293 LBS | HEART RATE: 78 BPM | SYSTOLIC BLOOD PRESSURE: 136 MMHG | HEIGHT: 70 IN | BODY MASS INDEX: 41.95 KG/M2

## 2022-04-12 DIAGNOSIS — E66.01 OBESITY, CLASS III, BMI 40-49.9 (MORBID OBESITY) (HCC): ICD-10-CM

## 2022-04-12 DIAGNOSIS — E28.2 PCOS (POLYCYSTIC OVARIAN SYNDROME): Primary | ICD-10-CM

## 2022-04-12 DIAGNOSIS — R73.03 PREDIABETES: ICD-10-CM

## 2022-04-12 DIAGNOSIS — R79.89 ABNORMAL LIVER FUNCTION TEST: ICD-10-CM

## 2022-04-12 PROBLEM — L03.221 CELLULITIS AND ABSCESS OF NECK: Status: RESOLVED | Noted: 2022-01-11 | Resolved: 2022-04-12

## 2022-04-12 PROBLEM — L02.11 CELLULITIS AND ABSCESS OF NECK: Status: RESOLVED | Noted: 2022-01-11 | Resolved: 2022-04-12

## 2022-04-12 PROCEDURE — 3725F SCREEN DEPRESSION PERFORMED: CPT | Performed by: PHYSICIAN ASSISTANT

## 2022-04-12 PROCEDURE — 99214 OFFICE O/P EST MOD 30 MIN: CPT | Performed by: PHYSICIAN ASSISTANT

## 2022-04-12 PROCEDURE — 3008F BODY MASS INDEX DOCD: CPT | Performed by: PHYSICIAN ASSISTANT

## 2022-04-12 PROCEDURE — 1036F TOBACCO NON-USER: CPT | Performed by: PHYSICIAN ASSISTANT

## 2022-04-12 NOTE — PROGRESS NOTES
Assessment/Plan:    Depression Screening and Follow-up Plan: Patient was screened for depression during today's encounter  They screened negative with a PHQ-2 score of 0  Diagnoses and all orders for this visit:    PCOS (polycystic ovarian syndrome)    Abnormal liver function test    Prediabetes    Obesity, Class III, BMI 40-49 9 (morbid obesity) ([facility] )        Pt is to change diet to more healthy choices as per handout I gave her  She is to exercise on a regular basis also  She is to keep appointment with weight management  She is also welcome to make nursing appointments to follow weight  Subjective:      Patient ID: Amada Lao is a 39 y o  female  Pt had labs for biometric screening through work  Her FBS was 139, her ALT and AST were both slightly elevated  She is seeing weight loss management in early June  She prefers medical weight loss at this time and not surgical  We discussed labs and the fact she most likely has fatty liver at this point  Dietary changes as well as regular exercise are needed  Pt is also complaining of ear feeling full and she thinks she may have wax  The following portions of the patient's history were reviewed and updated as appropriate:   She has a past medical history of Anxiety, Anxiety, Hypertension, Obesity, and PCOS (polycystic ovarian syndrome)  ,  does not have any pertinent problems on file  ,   has a past surgical history that includes No past surgeries  ,  family history includes Alcohol abuse in her father and paternal aunt; Bipolar disorder in her paternal aunt; Breast cancer in her maternal grandmother and mother; Colon cancer in her paternal uncle; Diabetes in her paternal grandmother; Heart disease in her father; Heart failure in her father; Hypertension in her father; Stroke in her paternal uncle ,   reports that she has never smoked  She has never used smokeless tobacco  She reports current alcohol use   She reports that she does not use drugs ,  is allergic to lactose - food allergy     Current Outpatient Medications   Medication Sig Dispense Refill    amLODIPine (NORVASC) 5 mg tablet       cholecalciferol (VITAMIN D3) 1,000 units tablet Take 1,000 Units by mouth daily      lisinopril-hydrochlorothiazide (PRINZIDE,ZESTORETIC) 20-12 5 MG per tablet Take 1 tablet by mouth daily  1    Magnesium 400 MG CAPS Take 400 mg by mouth daily       metoprolol succinate (TOPROL-XL) 50 mg 24 hr tablet Take 50 mg by mouth daily       potassium chloride (Klor-Con) 10 mEq tablet        No current facility-administered medications for this visit  Review of Systems   Constitutional: Negative for chills, diaphoresis, fatigue and fever  HENT: Negative for congestion, ear discharge, ear pain, postnasal drip and sore throat  Eyes: Negative for pain  Respiratory: Negative for chest tightness and shortness of breath  Gastrointestinal: Negative for abdominal pain, constipation, diarrhea and nausea  Neurological: Negative for dizziness, light-headedness and headaches  Objective:  Vitals:    04/12/22 1619   BP: 136/78   BP Location: Left arm   Patient Position: Sitting   Cuff Size: Large   Pulse: 78   Temp: 100 °F (37 8 °C)   TempSrc: Tympanic   SpO2: 97%   Weight: (!) 144 kg (318 lb)   Height: 5' 10" (1 778 m)     Body mass index is 45 63 kg/m²  Physical Exam  Vitals and nursing note reviewed  Constitutional:       Appearance: Normal appearance  She is obese  HENT:      Head: Normocephalic and atraumatic  Right Ear: Tympanic membrane, ear canal and external ear normal       Left Ear: Tympanic membrane, ear canal and external ear normal       Ears:      Comments: Very little wax in either ear  Eyes:      Conjunctiva/sclera: Conjunctivae normal    Neurological:      Mental Status: She is alert and oriented to person, place, and time     Psychiatric:         Mood and Affect: Mood normal          Behavior: Behavior normal          Labs personally reviewed and discussed with patient         Results for orders placed or performed in visit on 03/31/22   CBC and differential   Result Value Ref Range    WBC 7 80 4 31 - 10 16 Thousand/uL    RBC 4 74 3 81 - 5 12 Million/uL    Hemoglobin 15 0 11 5 - 15 4 g/dL    Hematocrit 42 1 34 8 - 46 1 %    MCV 89 82 - 98 fL    MCH 31 6 26 8 - 34 3 pg    MCHC 35 6 31 4 - 37 4 g/dL    RDW 12 2 11 6 - 15 1 %    MPV 11 2 8 9 - 12 7 fL    Platelets 618 164 - 789 Thousands/uL    nRBC 0 /100 WBCs    Neutrophils Relative 49 43 - 75 %    Immat GRANS % 0 0 - 2 %    Lymphocytes Relative 39 14 - 44 %    Monocytes Relative 7 4 - 12 %    Eosinophils Relative 4 0 - 6 %    Basophils Relative 1 0 - 1 %    Neutrophils Absolute 3 83 1 85 - 7 62 Thousands/µL    Immature Grans Absolute 0 03 0 00 - 0 20 Thousand/uL    Lymphocytes Absolute 3 06 0 60 - 4 47 Thousands/µL    Monocytes Absolute 0 53 0 17 - 1 22 Thousand/µL    Eosinophils Absolute 0 28 0 00 - 0 61 Thousand/µL    Basophils Absolute 0 07 0 00 - 0 10 Thousands/µL   Comprehensive metabolic panel   Result Value Ref Range    Sodium 139 136 - 145 mmol/L    Potassium 3 5 3 5 - 5 3 mmol/L    Chloride 106 100 - 108 mmol/L    CO2 28 21 - 32 mmol/L    ANION GAP 5 4 - 13 mmol/L    BUN 11 5 - 25 mg/dL    Creatinine 0 92 0 60 - 1 30 mg/dL    Glucose, Fasting 139 (H) 65 - 99 mg/dL    Calcium 9 7 8 3 - 10 1 mg/dL    AST 57 (H) 5 - 45 U/L    ALT 85 (H) 12 - 78 U/L    Alkaline Phosphatase 63 46 - 116 U/L    Total Protein 7 7 6 4 - 8 2 g/dL    Albumin 3 9 3 5 - 5 0 g/dL    Total Bilirubin 1 18 (H) 0 20 - 1 00 mg/dL    eGFR 80 ml/min/1 73sq m   Lipid panel   Result Value Ref Range    Cholesterol 178 See Comment mg/dL    Triglycerides 141 See Comment mg/dL    HDL, Direct 41 (L) >=50 mg/dL    LDL Calculated 109 (H) 0 - 100 mg/dL    Non-HDL-Chol (CHOL-HDL) 137 mg/dl     POC Hgb A1c is 6 4

## 2022-06-09 ENCOUNTER — OFFICE VISIT (OUTPATIENT)
Dept: BARIATRICS | Facility: CLINIC | Age: 37
End: 2022-06-09
Payer: COMMERCIAL

## 2022-06-09 VITALS
HEART RATE: 88 BPM | BODY MASS INDEX: 43.4 KG/M2 | SYSTOLIC BLOOD PRESSURE: 126 MMHG | WEIGHT: 293 LBS | TEMPERATURE: 98.4 F | RESPIRATION RATE: 14 BRPM | HEIGHT: 69 IN | DIASTOLIC BLOOD PRESSURE: 78 MMHG

## 2022-06-09 DIAGNOSIS — E28.2 PCOS (POLYCYSTIC OVARIAN SYNDROME): ICD-10-CM

## 2022-06-09 DIAGNOSIS — R73.03 PREDIABETES: ICD-10-CM

## 2022-06-09 DIAGNOSIS — Z91.89 AT RISK FOR OBSTRUCTIVE SLEEP APNEA: ICD-10-CM

## 2022-06-09 DIAGNOSIS — K76.0 FATTY LIVER: ICD-10-CM

## 2022-06-09 DIAGNOSIS — E78.2 MIXED HYPERLIPIDEMIA: ICD-10-CM

## 2022-06-09 DIAGNOSIS — E66.01 OBESITY, CLASS III, BMI 40-49.9 (MORBID OBESITY) (HCC): Primary | ICD-10-CM

## 2022-06-09 DIAGNOSIS — I10 ESSENTIAL HYPERTENSION: ICD-10-CM

## 2022-06-09 PROCEDURE — 1036F TOBACCO NON-USER: CPT | Performed by: INTERNAL MEDICINE

## 2022-06-09 PROCEDURE — 3008F BODY MASS INDEX DOCD: CPT | Performed by: INTERNAL MEDICINE

## 2022-06-09 PROCEDURE — 99244 OFF/OP CNSLTJ NEW/EST MOD 40: CPT | Performed by: INTERNAL MEDICINE

## 2022-06-09 RX ORDER — METFORMIN HYDROCHLORIDE 750 MG/1
750 TABLET, EXTENDED RELEASE ORAL
Qty: 30 TABLET | Refills: 3 | Status: SHIPPED | OUTPATIENT
Start: 2022-06-09

## 2022-06-09 NOTE — PATIENT INSTRUCTIONS
Goals:  Do not skip any meals! Food log (ie ) www myfitnesspal com,sparkpeople  com,loseit com,calorieking  com,etc  baritastic (use skinnytaste  com, dietdoctor  com or smartphone raffaele [employer] for recipes)  No sugary beverages  At least 64oz of water daily  Increase physical activity by 10 minutes daily   Gradually increase physical activity to a goal of 5 days per week for 30 minutes of MODERATE intensity PLUS 2 days per week of FULL BODY resistance training (use smartphone apps [employer], Home Workout, etc )  Goal protein 100g per day  Goal carbohydrates 100g per day   1600 calories

## 2022-06-09 NOTE — PROGRESS NOTES
Assessment/Plan:  Bartolo Gordon was seen today for consult  Diagnoses and all orders for this visit:    Obesity, Class III, BMI 40-49 9 (morbid obesity) (HCC)  -     metFORMIN (GLUCOPHAGE-XR) 750 mg 24 hr tablet; Take 1 tablet (750 mg total) by mouth daily with dinner    PCOS (polycystic ovarian syndrome)  -     metFORMIN (GLUCOPHAGE-XR) 750 mg 24 hr tablet; Take 1 tablet (750 mg total) by mouth daily with dinner    Essential hypertension  On Toprol XL, lisionpril-HCTZ, Norvasc  Weight loss and increasing activity level should help  Limit sodium intake <2500mg per day    Prediabetes  -     metFORMIN (GLUCOPHAGE-XR) 750 mg 24 hr tablet; Take 1 tablet (750 mg total) by mouth daily with dinner    At risk for obstructive sleep apnea  Previously had a negative sleep study  Regulate sleep schedule    Fatty liver  -     metFORMIN (GLUCOPHAGE-XR) 750 mg 24 hr tablet; Take 1 tablet (750 mg total) by mouth daily with dinner       - Discussed options of HealthyCORE-Intensive Lifestyle Intervention Program, Very Low Calorie Diet-VLCD, Conservative Program, Dwayne-En-Y Gastric Bypass, and Vertical Sleeve Gastrectomy and the role of weight loss medications  - Explained the importance of making lifestyle changes first before starting any anti-obesity medications  Patient should demonstrate lifestyle changes first before anti-obesity medication can be initiated  - Patient is interested in pursuing HealthyCORE-Intensive Lifestyle Intervention Program  - Initial weight loss goal of 5-10% weight loss for improved health  - Weight loss can improve patient's co-morbid conditions and/or prevent weight-related complications  Goals:  Do not skip any meals! Food log (ie ) www myfitnesspal com,sparkpeople  com,loseit com,calorieking  com,etc  baritastic (use skinnytaste  com, dietdoctor  com or smartphone raffaele [employer] for recipes)  No sugary beverages  At least 64oz of water daily  Increase physical activity by 10 minutes daily   Gradually increase physical activity to a goal of 5 days per week for 30 minutes of MODERATE intensity PLUS 2 days per week of FULL BODY resistance training (use smartphone apps FitON, Home Workout, etc )  Goal protein 100g per day  Goal carbohydrates 100g per day   1606-2399 calories     Total time spent: 45 minute visit, with >50% face-to-face time spent counseling patient on diet behavior and exercise modification for weight loss  Follow up in approximately 2 weeks with Non-Surgical Dietician  Subjective:   Chief Complaint   Patient presents with    Consult     Initial visit with Valerie       Patient ID: Umer Degroot  is a 39 y o  female with excess weight/obesity here to pursue weight management  Previous notes and records have been reviewed    Past Medical History:   Diagnosis Date    Anxiety     Anxiety     Hypertension     Obesity     PCOS (polycystic ovarian syndrome)      Past Surgical History:   Procedure Laterality Date    NO PAST SURGERIES         HPI:  Wt Readings from Last 20 Encounters:   06/09/22 (!) 144 kg (317 lb 11 2 oz)   04/12/22 (!) 144 kg (318 lb)   01/18/22 (!) 141 kg (310 lb)   01/11/22 (!) 144 kg (318 lb)   10/22/20 (!) 142 kg (313 lb)   03/06/20 135 kg (297 lb)   10/25/19 (!) 139 kg (307 lb)   01/02/19 132 kg (292 lb)   11/15/18 135 kg (297 lb 12 8 oz)   09/14/18 136 kg (299 lb)   07/20/18 (!) 138 kg (305 lb 5 4 oz)   03/26/18 132 kg (291 lb 12 8 oz)   03/13/18 135 kg (297 lb 2 9 oz)   03/06/18 134 kg (296 lb 3 2 oz)       Severity: Severe  Onset: over the years     Modifiers: Diet and Exercise, behavioral changes, Curves  Tried metformin for <6 months   Contributing factors: Poor Food Choices and Insufficient Physical Activity  Associated symptoms: comorbid conditions  Goal weight loss: 5-10% STG     B- yogurt, muffin or bagel with cream cheese   S-  L- cheeseburger or pizza  S-  D- Rafael[employer], or chick dago A  S-    Hydration:  oz water   Iced tea sometimes sweetened/unsweet  Alcohol: rare  Smoking: no   Exercise: no  Occupation: works at [employer];    Sleep: 9 hours total  STOP ban/8; previously had a sleep test 7 years ago and was negative     The following portions of the patient's history were reviewed and updated as appropriate: allergies, current medications, past family history, past medical history, past social history, past surgical history, and problem list     Review of Systems   Constitutional: Negative for appetite change, chills and fever  HENT: Negative for rhinorrhea and sore throat  Respiratory: Negative for cough, chest tightness and shortness of breath  Cardiovascular: Negative for chest pain and leg swelling  Gastrointestinal: Negative for abdominal pain, constipation, diarrhea, nausea and vomiting  Endocrine: Negative for cold intolerance and heat intolerance  Genitourinary: Negative for difficulty urinating  Musculoskeletal: Negative for arthralgias  Skin: Negative for color change  Neurological: Negative for dizziness, numbness and headaches  Psychiatric/Behavioral: Negative for sleep disturbance  The patient is not nervous/anxious  All other systems reviewed and are negative  Objective:  /78 (BP Location: Left arm, Patient Position: Sitting, Cuff Size: Large)   Pulse 88   Temp 98 4 °F (36 9 °C) (Tympanic)   Resp 14   Ht 5' 8 75" (1 746 m)   Wt (!) 144 kg (317 lb 11 2 oz)   BMI 47 26 kg/m²   Constitutional: Well-developed, well-nourished and obese Body mass index is 47 26 kg/m²  Roxi Navarro HEENT: No conjunctival pallor or jaundice  Pulmonary: No increased work of breathing or signs of respiratory distress  CV: Normal rate, well-perfused  GI: Obese  Non-distended  MSK: No edema   Neuro: Oriented to person, place and time  Normal Speech  Normal gait  Psych: Normal affect and mood  Labs and Imaging  Recent labs and imaging have been personally reviewed    Lab Results   Component Value Date    WBC 7 80 03/31/2022    HGB 15 0 03/31/2022    HCT 42 1 03/31/2022    MCV 89 03/31/2022     03/31/2022     Lab Results   Component Value Date    SODIUM 139 03/31/2022    K 3 5 03/31/2022     03/31/2022    CO2 28 03/31/2022    AGAP 5 03/31/2022    BUN 11 03/31/2022    CREATININE 0 92 03/31/2022    GLUC 91 03/13/2018    GLUF 139 (H) 03/31/2022    CALCIUM 9 7 03/31/2022    AST 57 (H) 03/31/2022    ALT 85 (H) 03/31/2022    ALKPHOS 63 03/31/2022    TP 7 7 03/31/2022    TBILI 1 18 (H) 03/31/2022    EGFR 80 03/31/2022     Lab Results   Component Value Date    HGBA1C 6 3 (H) 10/22/2020     Lab Results   Component Value Date    WNA2DKDOIQTG 2 952 10/22/2020     Lab Results   Component Value Date    CHOLESTEROL 178 03/31/2022     Lab Results   Component Value Date    HDL 41 (L) 03/31/2022     Lab Results   Component Value Date    TRIG 141 03/31/2022     Lab Results   Component Value Date    LDLCALC 109 (H) 03/31/2022

## 2022-06-27 ENCOUNTER — OFFICE VISIT (OUTPATIENT)
Dept: BARIATRICS | Facility: CLINIC | Age: 37
End: 2022-06-27

## 2022-06-27 DIAGNOSIS — R63.5 ABNORMAL WEIGHT GAIN: ICD-10-CM

## 2022-06-27 PROCEDURE — WMPRO12

## 2022-06-27 PROCEDURE — RECHECK

## 2022-06-27 NOTE — PROGRESS NOTES
Weight Management Medical Nutrition Assessment  Jony Timmons was here today as a new start in the [employer]   Today she weighs lbs and has a goal weight of lbs  Patient seen by Medical Provider in past 6 months:  yes  Requested to schedule appointment with Medical Provider: No      Anthropometric Measurements  Start Weight (#): 318 4  Current Weight (#): 318 4  TBW % Change from start weight:0%  Ideal Body Weight (#):143 75  Goal Weight (#):270 (STG)  220 (LTG)    Weight Loss History  Previous weight loss attempts: Commercial Programs ([employer], [employer], etc )    Food and Nutrition Related History  Wake up: 7:30 - 8:30 am   Bed Time: 1 - 2 am    Food Recall  Breakfast: muffin or bagel   Snack:  Lunch: burger or pizza (from work)  Snack: none  Dinner: take out ([employer] ect)  Snack:  "dessert"      Beverages: water, tea   Volume of beverage intake:  72 - 100    Weekends: Same  Cravings: sugar  Trouble area of day: night time    Frequency of Eating out: daily  Food restrictions:none  Cooking: self   Food Shopping: self    Physical Activity Intake  Activity:walking   Frequency:intermittently  Physical limitations/barriers to exercise: none    Estimated Needs  Energy    [employer] Energy Needs: BMR :   1-2# loss weekly sedentary:              1-2# loss weekly lightly active:  Maintenance calories for sedentary activity level:   Protein:      (1 2-1 5g/kg IBW)  Fluid:      (35mL/kg IBW)    Nutrition Diagnosis  Yes;     Overweight/obesity  related to Excess energy intake as evidenced by  BMI more than normative standard for age and sex (obesity-grade III 36+)       Nutrition Intervention    Nutrition Prescription  Calories:1600 - 1800  Protein:78 - 80  Fluid:76    Meal Plan (Hayes/Pro/Carb)  Breakfast:  Snack:  Lunch:  Snack:  Dinner:  Snack:    Nutrition Education:    Healthy Core Manual  Calorie controlled menu  Lean protein food choices  Healthy snack options  Food journaling tips      Nutrition Counseling:  Strategies: meal planning, portion sizes, healthy snack choices, hydration, fiber intake, protein intake, exercise, food journal      Monitoring and Evaluation:  Evaluation criteria:  Energy Intake  Meet protein needs  Maintain adequate hydration  Monitor weekly weight  Meal planning/preparation  Food journal   Decreased portions at mealtimes and snacks  Physical activity     Barriers to learning:none  Readiness to change: Action:  (Changing behavior)  Comprehension: good  Expected Compliance: good

## 2022-07-06 ENCOUNTER — CLINICAL SUPPORT (OUTPATIENT)
Dept: BARIATRICS | Facility: CLINIC | Age: 37
End: 2022-07-06

## 2022-07-06 VITALS — BODY MASS INDEX: 44.41 KG/M2 | WEIGHT: 293 LBS | HEIGHT: 68 IN

## 2022-07-06 DIAGNOSIS — R63.5 ABNORMAL WEIGHT GAIN: Primary | ICD-10-CM

## 2022-07-06 PROCEDURE — RECHECK

## 2022-07-13 ENCOUNTER — CLINICAL SUPPORT (OUTPATIENT)
Dept: BARIATRICS | Facility: CLINIC | Age: 37
End: 2022-07-13

## 2022-07-13 VITALS — HEIGHT: 68 IN | WEIGHT: 293 LBS | BODY MASS INDEX: 44.41 KG/M2

## 2022-07-13 DIAGNOSIS — R63.5 ABNORMAL WEIGHT GAIN: Primary | ICD-10-CM

## 2022-07-13 PROCEDURE — RECHECK

## 2022-07-19 ENCOUNTER — VBI (OUTPATIENT)
Dept: ADMINISTRATIVE | Facility: OTHER | Age: 37
End: 2022-07-19

## 2022-07-20 ENCOUNTER — OFFICE VISIT (OUTPATIENT)
Dept: BARIATRICS | Facility: CLINIC | Age: 37
End: 2022-07-20

## 2022-07-20 ENCOUNTER — CLINICAL SUPPORT (OUTPATIENT)
Dept: BARIATRICS | Facility: CLINIC | Age: 37
End: 2022-07-20

## 2022-07-20 VITALS — BODY MASS INDEX: 44.41 KG/M2 | WEIGHT: 293 LBS | HEIGHT: 68 IN

## 2022-07-20 DIAGNOSIS — R63.5 ABNORMAL WEIGHT GAIN: Primary | ICD-10-CM

## 2022-07-20 PROCEDURE — RECHECK

## 2022-07-20 NOTE — PROGRESS NOTES
Weight Management Medical Nutrition Assessment  Jeannine Childs was here today for her 2 week follow-up in the [facility] Program   Today she weighs  315 0 lbs giving her a loss of 3 4 lbs since starting  She has been food logging but admits that she is not always consistent  She has not started exercising yet          Patient seen by Medical Provider in past 6 months:  yes  Requested to schedule appointment with Medical Provider: No        Anthropometric Measurements  Start Weight (#): 318 4  Current Weight (#): 315 0  TBW % Change from start weight:1%  Ideal Body Weight (#):143 75  Goal Weight (#):270 (STG)  220 (LTG)     Weight Loss History  Previous weight loss attempts: Commercial Programs ([employer], [employer], etc )     Food and Nutrition Related History  Wake up: 7:30 - 8:30 am          Bed Time: 1 - 2 am     Food Recall  Breakfast: muffin or bagel         Snack:  Lunch: burger or pizza (from work)  Snack: none  Dinner: take out ([employer] ect)  Snack:  "dessert"        Beverages: water, tea   Volume of beverage intake:  72 - 100     Weekends: Same  Cravings: sugar  Trouble area of day: night time     Frequency of Eating out: daily  Food restrictions:none  Cooking: self   Food Shopping: self     Physical Activity Intake  Activity:walking   Frequency:intermittently  Physical limitations/barriers to exercise: none     Estimated Needs  Energy     [employer] [place] Energy Needs: BMR :   1-2# loss weekly sedentary:              1-2# loss weekly lightly active:  Maintenance calories for sedentary activity level:   Protein:      (1 2-1 5g/kg IBW)  Fluid:      (35mL/kg IBW)     Nutrition Diagnosis  Yes;     Overweight/obesity  related to Excess energy intake as evidenced by  BMI more than normative standard for age and sex (obesity-grade III 36+)     Nutrition Intervention     Nutrition Prescription  Calories:1600 - 1800  Protein:78 - 80  Fluid:76     Meal Plan (Hayes/Pro/Carb)  Breakfast:  Snack:  Lunch:  Snack:  Dinner:  Snack:     Nutrition Education:    Healthy Core Manual  Calorie controlled menu  Lean protein food choices  Healthy snack options  Food journaling tips        Nutrition Counseling:  Strategies: meal planning, portion sizes, healthy snack choices, hydration, fiber intake, protein intake, exercise, food journal        Monitoring and Evaluation:  Evaluation criteria:  Energy Intake  Meet protein needs  Maintain adequate hydration  Monitor weekly weight  Meal planning/preparation  Food journal   Decreased portions at mealtimes and snacks  Physical activity      Barriers to learning:none  Readiness to change: Action:  (Changing behavior)  Comprehension: good  Expected Compliance: good

## 2022-07-29 ENCOUNTER — TELEMEDICINE (OUTPATIENT)
Dept: FAMILY MEDICINE CLINIC | Facility: CLINIC | Age: 37
End: 2022-07-29
Payer: COMMERCIAL

## 2022-07-29 DIAGNOSIS — U07.1 COVID-19: ICD-10-CM

## 2022-07-29 DIAGNOSIS — Z20.822 EXPOSURE TO COVID-19 VIRUS: ICD-10-CM

## 2022-07-29 PROCEDURE — 99213 OFFICE O/P EST LOW 20 MIN: CPT

## 2022-07-29 NOTE — PROGRESS NOTES
COVID-19 Outpatient Progress Note    Assessment/Plan:    Problem List Items Addressed This Visit    None     Visit Diagnoses     Exposure to COVID-19 virus        Relevant Medications    nirmatrelvir & ritonavir (Paxlovid) tablet therapy pack    COVID-19        Start taking antiviral medication  Stay hydrated, take vitamin-C, vitamin-D  Mucinex as needed twice a day for cough    Relevant Medications    nirmatrelvir & ritonavir (Paxlovid) tablet therapy pack         Disposition:     Patient is fully vaccinated and is not yet due for their booster shot  According to CDC guidelines, quarantine is not required after close contact exposure and they were advised to wear a mask for 10 days after the exposure  If patient were to develop symptoms, they should immediately self isolate and call our office for further guidance  Discussed symptom directed medication options with patient  Discussed vitamin D, vitamin C, and/or zinc supplementation with patient  Patient meets criteria for PAXLOVID and they have been counseled appropriately according to EUA documentation released by the FDA  After discussion, patient agrees to treatment  Esau Floridalma is an investigational medicine used to treat mild-to-moderate COVID-19 in adults and children (15years of age and older weighing at least 80 pounds (40 kg)) with positive results of direct SARS-CoV-2 viral testing, and who are at high risk for progression to severe COVID-19, including hospitalization or death  PAXLOVID is investigational because it is still being studied  There is limited information about the safety and effectiveness of using PAXLOVID to treat people with mild-to-moderate COVID-19      The FDA has authorized the emergency use of PAXLOVID for the treatment of mild-tomoderate COVID-19 in adults and children (15years of age and older weighing at least 80 pounds (40 kg)) with a positive test for the virus that causes COVID-19, and who are at high risk for progression to severe COVID-19, including hospitalization or death, under an EUA  What should I tell my healthcare provider before I take PAXLOVID? Tell your healthcare provider if you:  - Have any allergies  - Have liver or kidney disease  - Are pregnant or plan to become pregnant  - Are breastfeeding a child  - Have any serious illnesses    Tell your healthcare provider about all the medicines you take, including prescription and over-the-counter medicines, vitamins, and herbal supplements  Some medicines may interact with PAXLOVID and may cause serious side effects  Keep a list of your medicines to show your healthcare provider and pharmacist when you get a new medicine  You can ask your healthcare provider or pharmacist for a list of medicines that interact with PAXLOVID  Do not start taking a new medicine without telling your healthcare provider  Your healthcare provider can tell you if it is safe to take PAXLOVID with other medicines  Tell your healthcare provider if you are taking combined hormonal contraceptive  PAXLOVID may affect how your birth control pills work  Females who are able to become pregnant should use another effective alternative form of contraception or an additional barrier method of contraception  Talk to your healthcare provider if you have any questions about contraceptive methods that might be right for you  How do I take PAXLOVID? PAXLOVID consists of 2 medicines: nirmatrelvir and ritonavir  - Take 2 pink tablets of nirmatrelvir with 1 white tablet of ritonavir by mouth 2 times each day (in the morning and in the evening) for 5 days  For each dose, take all 3 tablets at the same time  - If you have kidney disease, talk to your healthcare provider  You may need a different dose  - Swallow the tablets whole  Do not chew, break, or crush the tablets  - Take PAXLOVID with or without food    - Do not stop taking PAXLOVID without talking to your healthcare provider, even if you feel better  - If you miss a dose of PAXLOVID within 8 hours of the time it is usually taken, take it as soon as you remember  If you miss a dose by more than 8 hours, skip the missed dose and take the next dose at your regular time  Do not take 2 doses of PAXLOVID at the same time  - If you take too much PAXLOVID, call your healthcare provider or go to the nearest hospital emergency room right away  - If you are taking a ritonavir- or cobicistat-containing medicine to treat hepatitis C or Human Immunodeficiency Virus (HIV), you should continue to take your medicine as prescribed by your healthcare provider   - Talk to your healthcare provider if you do not feel better or if you feel worse after 5 days  Who should generally not take PAXLOVID? Do not take PAXLOVID if:  You are allergic to nirmatrelvir, ritonavir, or any of the ingredients in PAXLOVID  You are taking any of the following medicines:  - Alfuzosin  - Pethidine, piroxicam, propoxyphene  - Ranolazine  - Amiodarone, dronedarone, flecainide, propafenone, quinidine  - Colchicine  - Lurasidone, pimozide, clozapine  - Dihydroergotamine, ergotamine, methylergonovine  - Lovastatin, simvastatin  - Sildenafil (Revatio®) for pulmonary arterial hypertension (PAH)  - Triazolam, oral midazolam  - Apalutamide  - Carbamazepine, phenobarbital, phenytoin  - Rifampin  - St  Taquerias Wort (hypericum perforatum)    What are the important possible side effects of PAXLOVID? Possible side effects of PAXLOVID are:  - Liver Problems  Tell your healthcare provider right away if you have any of these signs and symptoms of liver problems: loss of appetite, yellowing of your skin and the whites of eyes (jaundice), dark-colored urine, pale colored stools and itchy skin, stomach area (abdominal) pain  - Resistance to HIV Medicines  If you have untreated HIV infection, PAXLOVID may lead to some HIV medicines not working as well in the future    - Other possible side effects include: altered sense of taste, diarrhea, high blood pressure, or muscle aches    These are not all the possible side effects of PAXLOVID  Not many people have taken PAXLOVID  Serious and unexpected side effects may happen  Germán Marcial is still being studied, so it is possible that all of the risks are not known at this time  What other treatment choices are there? Like Candy Reyes may allow for the emergency use of other medicines to treat people with COVID-19  Go to https://[employer]/ for information on the emergency use of other medicines that are authorized by FDA to treat people with COVID-19  Your healthcare provider may talk with you about clinical trials for which you may be eligible  It is your choice to be treated or not to be treated with PAXLOVID  Should you decide not to receive it or for your child not to receive it, it will not change your standard medical care  What if I am pregnant or breastfeeding? There is no experience treating pregnant women or breastfeeding mothers with PAXLOVID  For a mother and unborn baby, the benefit of taking PAXLOVID may be greater than the risk from the treatment  If you are pregnant, discuss your options and specific situation with your healthcare provider  It is recommended that you use effective barrier contraception or do not have sexual activity while taking PAXLOVID  If you are breastfeeding, discuss your options and specific situation with your healthcare provider  How do I report side effects with PAXLOVID? Contact your healthcare provider if you have any side effects that bother you or do not go away  Report side effects to FDA MedWatch at www fda gov/medwatch or call 5-607-SMT4365 or you can report side effects to [facility]  at the contact information provided below      Website Fax number Telephone number www [employer] [phone]     How should I store Kasi Gonzalez? Store PAXLOVID tablets at room temperature between 68°F to 77°F (20°C to 25°C)  Full fact sheet for patients, parents, and caregivers can be found at: [employer]    I have spent 10 minutes directly with the patient  Greater than 50% of this time was spent in counseling/coordination of care regarding: diagnostic results, prognosis, risks and benefits of treatment options, instructions for management, patient and family education, risk factor reductions and impressions  Encounter provider DONTE Dewey    Provider located at [facility] [phone] [facility] [place]  [address] [place] [id number] [phone]    Recent Visits  No visits were found meeting these conditions  Showing recent visits within past 7 days and meeting all other requirements  Today's Visits  Date Type Provider Dept   07/29/22 Telemedicine DONTE Dewey [facility] [address] today's visits and meeting all other requirements  Future Appointments  No visits were found meeting these conditions  Showing future appointments within next 150 days and meeting all other requirements     This virtual check-in was done via [employer] and patient was informed that this is a secure, HIPAA-compliant platform  She agrees to proceed  Patient agrees to participate in a virtual check in via telephone or video visit instead of presenting to the office to address urgent/immediate medical needs  Patient is aware this is a billable service  After connecting through [facility], the patient was identified by name and date of birth  Jay Lombardi was informed that this was a telemedicine visit and that the exam was being conducted confidentially over secure lines  My office door was closed  No one else was in the room   [facility] Rosario Georges acknowledged consent and understanding of privacy and security of the telemedicine visit  I informed the patient that I have reviewed her record in Epic and presented the opportunity for her to ask any questions regarding the visit today  The patient agreed to participate  Verification of patient location:  Patient is located in the following state in which I hold an active license: [place]    Subjective:   Deana Calderon is a 40 y o  female who is concerned about COVID-19  Patient's symptoms include fever, cough and headache  Patient denies chills, fatigue, malaise, congestion, rhinorrhea, sore throat, anosmia, loss of taste, shortness of breath, chest tightness, abdominal pain, nausea, vomiting, diarrhea and myalgias       - Date of symptom onset: 7/28/2022      COVID-19 vaccination status: Fully vaccinated with booster    Exposure:   Contact with a person who is under investigation (PUI) for or who is positive for COVID-19 within the last 14 days?: Yes    Took at home covid test today that came back positive     No results found for: Maciej Bitter, SARSCORONAVI, CORONAVIRUSR, SARSCOVAG, [address] [place] [address]  Past Medical History:   Diagnosis Date    Anxiety     Anxiety     Hypertension     Obesity     PCOS (polycystic ovarian syndrome)      Past Surgical History:   Procedure Laterality Date    NO PAST SURGERIES       Current Outpatient Medications   Medication Sig Dispense Refill    nirmatrelvir & ritonavir (Paxlovid) tablet therapy pack Take 3 tablets by mouth 2 (two) times a day for 5 days Take 2 nirmatrelvir tablets + 1 ritonavir tablet together per dose 30 tablet 0    amLODIPine (NORVASC) 5 mg tablet       cholecalciferol (VITAMIN D3) 1,000 units tablet Take 1,000 Units by mouth daily      lisinopril-hydrochlorothiazide (PRINZIDE,ZESTORETIC) 20-12 5 MG per tablet Take 1 tablet by mouth daily  1    Magnesium 400 MG CAPS Take 400 mg by mouth daily       metFORMIN (GLUCOPHAGE-XR) 750 mg 24 hr tablet Take 1 tablet (750 mg total) by mouth daily with dinner 30 tablet 3    metoprolol succinate (TOPROL-XL) 50 mg 24 hr tablet Take 50 mg by mouth daily       potassium chloride (Klor-Con) 10 mEq tablet        No current facility-administered medications for this visit  Allergies   Allergen Reactions    Lactose - Food Allergy Diarrhea       Review of Systems   Constitutional: Positive for fever  Negative for chills and fatigue  HENT: Negative for congestion, rhinorrhea and sore throat  Respiratory: Positive for cough  Negative for chest tightness and shortness of breath  Gastrointestinal: Negative for abdominal pain, diarrhea, nausea and vomiting  Musculoskeletal: Negative for myalgias  Neurological: Positive for headaches  Objective: There were no vitals filed for this visit  Physical Exam  Nursing note reviewed  Constitutional:       General: She is not in acute distress  Appearance: She is not ill-appearing  HENT:      Head: Normocephalic  Pulmonary:      Effort: Pulmonary effort is normal  No respiratory distress  Neurological:      Mental Status: She is alert and oriented to person, place, and time  Psychiatric:         Mood and Affect: Mood normal          Behavior: Behavior normal          Thought Content: Thought content normal          Judgment: Judgment normal          VIRTUAL VISIT DISCLAIMER    Silvestre Garcia verbally agrees to participate in [employer]  Pt is aware that [employer] could be limited without vital signs or the ability to perform a full hands-on physical exam  Alix Garcia understands she or the provider may request at any time to terminate the video visit and request the patient to seek care or treatment in person

## 2022-08-03 ENCOUNTER — CLINICAL SUPPORT (OUTPATIENT)
Dept: BARIATRICS | Facility: CLINIC | Age: 37
End: 2022-08-03

## 2022-08-03 VITALS — HEIGHT: 68 IN | WEIGHT: 293 LBS | BODY MASS INDEX: 44.41 KG/M2

## 2022-08-03 DIAGNOSIS — R63.5 ABNORMAL WEIGHT GAIN: Primary | ICD-10-CM

## 2022-08-03 PROCEDURE — RECHECK

## 2022-08-05 ENCOUNTER — VBI (OUTPATIENT)
Dept: ADMINISTRATIVE | Facility: OTHER | Age: 37
End: 2022-08-05

## 2022-08-10 ENCOUNTER — CLINICAL SUPPORT (OUTPATIENT)
Dept: BARIATRICS | Facility: CLINIC | Age: 37
End: 2022-08-10

## 2022-08-10 VITALS — HEIGHT: 68 IN | WEIGHT: 293 LBS | BODY MASS INDEX: 44.41 KG/M2

## 2022-08-10 DIAGNOSIS — R63.5 ABNORMAL WEIGHT GAIN: Primary | ICD-10-CM

## 2022-08-10 PROCEDURE — RECHECK

## 2022-08-17 ENCOUNTER — CLINICAL SUPPORT (OUTPATIENT)
Dept: BARIATRICS | Facility: CLINIC | Age: 37
End: 2022-08-17

## 2022-08-17 VITALS — BODY MASS INDEX: 44.41 KG/M2 | WEIGHT: 293 LBS | HEIGHT: 68 IN

## 2022-08-17 DIAGNOSIS — R63.5 ABNORMAL WEIGHT GAIN: Primary | ICD-10-CM

## 2022-08-17 PROCEDURE — RECHECK

## 2022-08-23 ENCOUNTER — OFFICE VISIT (OUTPATIENT)
Dept: BARIATRICS | Facility: CLINIC | Age: 37
End: 2022-08-23

## 2022-08-23 DIAGNOSIS — R63.5 ABNORMAL WEIGHT GAIN: Primary | ICD-10-CM

## 2022-08-23 PROCEDURE — RECHECK

## 2022-08-23 NOTE — PROGRESS NOTES
Weight Management Medical Nutrition Assessment  Pamela Briggs was here today for her 2 month  follow-up in the [facility]  Peg Gulling she weighs  310 lbs giving her a loss of 3 4 lbs since starting  She has been food logging but admits that she is not always consistent  She has not started exercising yet          Patient seen by Medical Provider in past 6 months:  yes  Requested to schedule appointment with Medical Provider: No        Anthropometric Measurements  Start Weight (#): 318 4  Current Weight (#): 315 0  TBW % Change from start weight:1%  Ideal Body Weight (#):143 75  Goal Weight (#):270 (STG)  220 (LTG)     Weight Loss History  Previous weight loss attempts: Commercial Programs (Weight Watchers, Vasyl Lofton, etc )     Food and Nutrition Related History  Wake up: 7:30 - 8:30 am          Bed Time: 1 - 2 am     Food Recall  Breakfast: muffin or bagel         Snack:  Lunch: burger or pizza (from work)  Snack: none  Dinner: take out ([employer])  Snack:  "dessert"        Beverages: water, tea   Volume of beverage intake:  72 - 100     Weekends: Same  Cravings: sugar  Trouble area of day: night time     Frequency of Eating out: daily  Food restrictions:none  Cooking: self   Food Shopping: self     Physical Activity Intake  Activity:walking   Frequency:intermittently  Physical limitations/barriers to exercise: none     Estimated Needs  Energy     Bear Mandi Energy Needs:  BMR :   1-2# loss weekly sedentary:              1-2# loss weekly lightly active:  Maintenance calories for sedentary activity level:   Protein:      (1 2-1 5g/kg IBW)  Fluid:      (35mL/kg IBW)     Nutrition Diagnosis  Yes;    Overweight/obesity  related to Excess energy intake as evidenced by  BMI more than normative standard for age and sex (obesity-grade III 36+)     Nutrition Intervention     Nutrition Prescription  Calories:1600 - 1800  Protein:78 - 80  Fluid:76     Meal Plan (Hayes/Pro/Carb)  Breakfast:  Snack:  Lunch:  Snack:  Dinner:  Snack:     Nutrition Education:    Healthy Core Manual  Calorie controlled menu  Lean protein food choices  Healthy snack options  Food journaling tips        Nutrition Counseling:  Strategies: meal planning, portion sizes, healthy snack choices, hydration, fiber intake, protein intake, exercise, food journal        Monitoring and Evaluation:  Evaluation criteria:  Energy Intake  Meet protein needs  Maintain adequate hydration  Monitor weekly weight  Meal planning/preparation  Food journal   Decreased portions at mealtimes and snacks  Physical activity      Barriers to learning:none  Readiness to change: Action:  (Changing behavior)  Comprehension: good  Expected Compliance: good

## 2022-08-24 ENCOUNTER — CLINICAL SUPPORT (OUTPATIENT)
Dept: BARIATRICS | Facility: CLINIC | Age: 37
End: 2022-08-24

## 2022-08-24 VITALS — HEIGHT: 68 IN | BODY MASS INDEX: 44.41 KG/M2 | WEIGHT: 293 LBS

## 2022-08-24 DIAGNOSIS — R63.5 ABNORMAL WEIGHT GAIN: Primary | ICD-10-CM

## 2022-09-07 ENCOUNTER — CLINICAL SUPPORT (OUTPATIENT)
Dept: BARIATRICS | Facility: CLINIC | Age: 37
End: 2022-09-07

## 2022-09-07 VITALS — BODY MASS INDEX: 44.41 KG/M2 | WEIGHT: 293 LBS | HEIGHT: 68 IN

## 2022-09-07 DIAGNOSIS — R63.5 ABNORMAL WEIGHT GAIN: Primary | ICD-10-CM

## 2022-09-14 ENCOUNTER — CLINICAL SUPPORT (OUTPATIENT)
Dept: BARIATRICS | Facility: CLINIC | Age: 37
End: 2022-09-14

## 2022-09-14 VITALS — HEIGHT: 68 IN | WEIGHT: 293 LBS | BODY MASS INDEX: 44.41 KG/M2

## 2022-09-14 DIAGNOSIS — R63.5 ABNORMAL WEIGHT GAIN: Primary | ICD-10-CM

## 2022-09-16 ENCOUNTER — OCCMED (OUTPATIENT)
Dept: URGENT CARE | Facility: CLINIC | Age: 37
End: 2022-09-16

## 2022-09-16 DIAGNOSIS — Z02.1 PHYSICAL EXAM, PRE-EMPLOYMENT: Primary | ICD-10-CM

## 2022-09-16 LAB — RUBV IGG SERPL IA-ACNC: 159.4 IU/ML

## 2022-09-16 PROCEDURE — 86787 VARICELLA-ZOSTER ANTIBODY: CPT | Performed by: NURSE PRACTITIONER

## 2022-09-16 PROCEDURE — 86762 RUBELLA ANTIBODY: CPT | Performed by: NURSE PRACTITIONER

## 2022-09-16 PROCEDURE — 86765 RUBEOLA ANTIBODY: CPT | Performed by: NURSE PRACTITIONER

## 2022-09-16 PROCEDURE — 86735 MUMPS ANTIBODY: CPT | Performed by: NURSE PRACTITIONER

## 2022-09-16 PROCEDURE — 86480 TB TEST CELL IMMUN MEASURE: CPT | Performed by: NURSE PRACTITIONER

## 2022-09-17 LAB
MEV IGG SER QL IA: NORMAL
MUV IGG SER QL IA: NORMAL
VZV IGG SER QL IA: NORMAL

## 2022-09-18 LAB
GAMMA INTERFERON BACKGROUND BLD IA-ACNC: 0.05 IU/ML
M TB IFN-G BLD-IMP: NEGATIVE
M TB IFN-G CD4+ BCKGRND COR BLD-ACNC: 0.06 IU/ML
M TB IFN-G CD4+ BCKGRND COR BLD-ACNC: 0.07 IU/ML
MITOGEN IGNF BCKGRD COR BLD-ACNC: >10 IU/ML

## 2022-09-21 ENCOUNTER — CLINICAL SUPPORT (OUTPATIENT)
Dept: BARIATRICS | Facility: CLINIC | Age: 37
End: 2022-09-21

## 2022-09-21 VITALS — WEIGHT: 293 LBS | HEIGHT: 68 IN | BODY MASS INDEX: 44.41 KG/M2

## 2022-09-21 DIAGNOSIS — R63.5 ABNORMAL WEIGHT GAIN: Primary | ICD-10-CM

## 2022-09-27 ENCOUNTER — OFFICE VISIT (OUTPATIENT)
Dept: BARIATRICS | Facility: CLINIC | Age: 37
End: 2022-09-27

## 2022-09-27 VITALS — BODY MASS INDEX: 46.68 KG/M2 | WEIGHT: 293 LBS

## 2022-09-27 DIAGNOSIS — R63.5 ABNORMAL WEIGHT GAIN: Primary | ICD-10-CM

## 2022-09-27 PROCEDURE — RECHECK

## 2022-09-27 NOTE — PROGRESS NOTES
Patient presents for [facility] Evaluation as a part of Healthy Core Program, current weight 307lbs  Eating behaviors/food choices: Patient wants to gain a deeper understanding of the eating behaviors she has developed over her lifetime and how to break those habits  She was receptive to education about habits being formed over a lifetime and the the use of food as a coping skill is a common one  Patient recognizes some foods that she chooses are for the way they make her feel rather than nutrition, some places and people trigger her to eat in a certain way  Patient is going to work on this with the use of stop, think, act along with planning, prepping and tracking her foods     Activity/Exercise:  Patient does have a gym membership but while busy with work she wasn't able to go as frequently as she wanted to  She just got a new job and plans to make time to get in activity more frequently  Discussed ways to work in activity, setting small goals for herself and incorporating activity into a new routine  Sleep/Rest:  Patient states she mostly gets good rest but wants to work on improving that  Patient does understanding the impact sleep depravation has on weight  Mental Health/Wellness:  Patient recognizes that past trauma and current anxiety play into the coping skills she uses with food  She is insightful about what her behaviors are and very receptive to working through the triggers of behaviors that impact her weight management  She is going to work on logging her foods along with mood logging to understand her habit loop better  She was agreeable to planning out her meals, looking ahead to her week to identify days that are busier and she is more likely to depend on take out  Discussed ways to still have the foods she enjoys in moderation while getting the nutrition she needs       Goals:    - focus on meal planning and prepping meals  - continue efforts to move throughout the day, adding in exercise when able  - log food and mood to identify habit loops, triggers and what the rewards are    Next Appointment:  Has class on 9/28 and visit with Dr Devorah Mercer 9/29

## 2022-09-27 NOTE — PATIENT INSTRUCTIONS
- focus on meal planning and prepping meals  - continue efforts to move throughout the day, adding in exercise when able  - log food and mood to identify habit loops, triggers and what the rewards are

## 2022-09-28 ENCOUNTER — CLINICAL SUPPORT (OUTPATIENT)
Dept: BARIATRICS | Facility: CLINIC | Age: 37
End: 2022-09-28

## 2022-09-28 VITALS — BODY MASS INDEX: 44.41 KG/M2 | HEIGHT: 68 IN | WEIGHT: 293 LBS

## 2022-09-28 DIAGNOSIS — R63.5 ABNORMAL WEIGHT GAIN: Primary | ICD-10-CM

## 2022-09-28 PROCEDURE — RECHECK

## 2022-09-29 ENCOUNTER — OFFICE VISIT (OUTPATIENT)
Dept: BARIATRICS | Facility: CLINIC | Age: 37
End: 2022-09-29
Payer: COMMERCIAL

## 2022-09-29 VITALS
SYSTOLIC BLOOD PRESSURE: 120 MMHG | WEIGHT: 293 LBS | HEIGHT: 69 IN | BODY MASS INDEX: 43.4 KG/M2 | TEMPERATURE: 99.5 F | RESPIRATION RATE: 14 BRPM | HEART RATE: 81 BPM | DIASTOLIC BLOOD PRESSURE: 84 MMHG

## 2022-09-29 DIAGNOSIS — E66.01 OBESITY, CLASS III, BMI 40-49.9 (MORBID OBESITY) (HCC): ICD-10-CM

## 2022-09-29 DIAGNOSIS — K76.0 FATTY LIVER: Primary | ICD-10-CM

## 2022-09-29 DIAGNOSIS — N92.6 IRREGULAR MENSES: ICD-10-CM

## 2022-09-29 DIAGNOSIS — E28.2 PCOS (POLYCYSTIC OVARIAN SYNDROME): ICD-10-CM

## 2022-09-29 DIAGNOSIS — R73.03 PREDIABETES: ICD-10-CM

## 2022-09-29 DIAGNOSIS — E55.9 VITAMIN D DEFICIENCY: ICD-10-CM

## 2022-09-29 DIAGNOSIS — Z91.89 AT RISK FOR SLEEP APNEA: ICD-10-CM

## 2022-09-29 DIAGNOSIS — E87.6 HYPOKALEMIA: ICD-10-CM

## 2022-09-29 DIAGNOSIS — F41.1 GENERALIZED ANXIETY DISORDER: ICD-10-CM

## 2022-09-29 PROCEDURE — 99214 OFFICE O/P EST MOD 30 MIN: CPT | Performed by: FAMILY MEDICINE

## 2022-09-29 RX ORDER — SPIRONOLACTONE 25 MG/1
25 TABLET ORAL DAILY
Qty: 30 TABLET | Refills: 1 | Status: SHIPPED | OUTPATIENT
Start: 2022-09-29 | End: 2022-10-24

## 2022-09-29 NOTE — PROGRESS NOTES
Assessment/Plan:  Shakila Valente was seen today for follow-up  Diagnoses and all orders for this visit:    Fatty liver  -     Comprehensive metabolic panel; Future    Vitamin D deficiency  cotninue supplementation  Obesity, Class III, BMI 40-49 9 (morbid obesity) (HCC)    Prediabetes  -     HEMOGLOBIN A1C W/ EAG ESTIMATION; Future  Irregular menses  PCOS (polycystic ovarian syndrome)  -     spironolactone (ALDACTONE) 25 mg tablet; Take 1 tablet (25 mg total) by mouth daily  Advised to continue Metformin plan to increase at next refill, she had no side effects  Advised BCP use but she wants to wait on it    Hypokalemia  -     Comprehensive metabolic panel; Future  Plan to D/C K if tolerates Spironolactone    Generalized anxiety disorder  -     TSH w/Reflex; Future    At risk for sleep apnea  -     Ambulatory referral to Sleep Medicine; Future         Weight not at goal  Nutrition prescription:  Calorie goal: 1700kcal    Encourage mindful eating, portion control, motivational interview performed to help patient reach goals   Patient denies personal and family history of  pancreatitis, thyroid cancer, MEN-2 tumors  Denies any hx of glaucoma, seizures, kidney stones, gallstones  Had Hx of palpitations, on Metoprolol that helped- not a candidate for Phentermine unles can D/C Metoprolol  Denies uncontrolled anxiety or depression, insomnia or sleep disturbance  Discussed Russel Michelle but she does not want to inject  Component Ref Range & Units 10/22/20  8:45 AM 11/9/18  9:50 AM 3/8/18  9:48 AM   Hemoglobin A1C Normal 3 8-5 6%; PreDiabetic 5 7-6 4%; Diabetic >=6 5%; Glycemic control for adults with diabetes <7 0% % 6 3 High   5 6 R, CM  5 2         Total time spent: 30 minutes with >50%  face-to-face time spent counseling patient on nonsurgical interventions for the treatment of excess weight  Discussed the role of weight loss medications    Counseled patient on diet behavior and exercise modification for weight loss     Follow up in approximately 6 weeks      Subjective:   Chief Complaint   Patient presents with    Follow-up     Patient here to discuss weight associated problems and nutrition goals  HPI: Virgina Favre  is a 40 y o  female with excess weight/obesity here to pursue weight management  Most recent notes and records were reviewed  Initial weight loss goal of 5-10% weight loss for improved health  Wt Readings from Last 10 Encounters:   09/29/22 (!) 138 kg (304 lb 14 4 oz)   09/28/22 (!) 139 kg (307 lb 3 2 oz)   09/27/22 (!) 139 kg (307 lb)   09/21/22 (!) 140 kg (308 lb 3 2 oz)   09/14/22 (!) 141 kg (311 lb)   09/07/22 (!) 141 kg (311 lb)   08/24/22 (!) 139 kg (307 lb)   08/17/22 (!) 140 kg (309 lb 9 6 oz)   08/10/22 (!) 141 kg (311 lb 12 8 oz)   08/03/22 (!) 141 kg (310 lb 12 8 oz)   Mathew Davison had 2 month  follow-up in the [employer]   She weighs  310 lbs giving her a loss of 3 4 lbs since starting  Initial weight:6/9/22 311lbs  Current weight:304lbs  Change in weight:-7lbs    Food logging: none  Increased appetite/cravings: none after starting Metformin   Saw dietician   Hydration 70oz water       The following portions of the patient's history were reviewed and updated as appropriate: allergies, current medications, past family history, past medical history, past social history, past surgical history, and problem list       Review of Systems   Constitutional: Negative for activity chang_++ Fatigue  HENT: Negative for trouble swallowing  Respiratory: Negative for shortness of breath      Cardiovascular: Negative for chest pain, edema  Gastrointestinal: Negative for abdominal pain, nausea and vomiting, acid reflux, constipation/diarrhea  Psychiatric/Behavioral: Negative for behavioral problems , anxiety or depression  Does not sleep well    Objective:  /84 (BP Location: Left arm, Patient Position: Sitting, Cuff Size: Large) Comment (BP Location): lower arm  Pulse 81   Temp 99 5 °F (37 5 °C) (Tympanic)   Resp 14   Ht 5' 8 75" (1 746 m)   Wt (!) 138 kg (304 lb 14 4 oz)   BMI 45 35 kg/m²   Constitutional: Well-developed, well-nourished and Obese Body mass index is 45 35 kg/m²  Monia Le HEENT: No conjunctival injection  No thyroid masses  Pulmonary: No increased work of breathing or signs of respiratory distress  Clear respiratory sounds  CV: Well-perfused, Regular rate and rhythm, no murmurs, no edema  GI: increased abdominal girth  Non-distended  Not tender   Endo: no ophthalmopathy, no dermopathy, truncal obesity  MSK: no sarcopenia  Neuro: Oriented to person, place and time  Normal Speech  Normal gait  Psych: Normal affect and mood  No delusion or hallucinations, normal thought process    Labs and Imaging  Recent labs and imaging have been personally reviewed    Lab Results   Component Value Date    WBC 7 80 03/31/2022    HGB 15 0 03/31/2022    HCT 42 1 03/31/2022    MCV 89 03/31/2022     03/31/2022     Lab Results   Component Value Date    SODIUM 139 03/31/2022    K 3 5 03/31/2022     03/31/2022    CO2 28 03/31/2022    AGAP 5 03/31/2022    BUN 11 03/31/2022    CREATININE 0 92 03/31/2022    GLUC 91 03/13/2018    GLUF 139 (H) 03/31/2022    CALCIUM 9 7 03/31/2022    AST 57 (H) 03/31/2022    ALT 85 (H) 03/31/2022    ALKPHOS 63 03/31/2022    TP 7 7 03/31/2022    TBILI 1 18 (H) 03/31/2022    EGFR 80 03/31/2022     Lab Results   Component Value Date    HGBA1C 6 3 (H) 10/22/2020     Lab Results   Component Value Date    GOS1FSEMXWQC 2 952 10/22/2020     Lab Results   Component Value Date    CHOLESTEROL 178 03/31/2022     Lab Results   Component Value Date    HDL 41 (L) 03/31/2022     Lab Results   Component Value Date    TRIG 141 03/31/2022     Lab Results   Component Value Date    LDLCALC 109 (H) 03/31/2022

## 2022-10-05 ENCOUNTER — CLINICAL SUPPORT (OUTPATIENT)
Dept: BARIATRICS | Facility: CLINIC | Age: 37
End: 2022-10-05

## 2022-10-05 VITALS — HEIGHT: 69 IN | WEIGHT: 293 LBS | BODY MASS INDEX: 43.4 KG/M2

## 2022-10-05 DIAGNOSIS — R63.5 ABNORMAL WEIGHT GAIN: Primary | ICD-10-CM

## 2022-10-05 PROCEDURE — RECHECK

## 2022-10-06 ENCOUNTER — TELEPHONE (OUTPATIENT)
Dept: PULMONOLOGY | Facility: CLINIC | Age: 37
End: 2022-10-06

## 2022-10-13 DIAGNOSIS — K76.0 FATTY LIVER: ICD-10-CM

## 2022-10-13 DIAGNOSIS — R73.03 PREDIABETES: ICD-10-CM

## 2022-10-13 DIAGNOSIS — E66.01 OBESITY, CLASS III, BMI 40-49.9 (MORBID OBESITY) (HCC): ICD-10-CM

## 2022-10-13 DIAGNOSIS — E28.2 PCOS (POLYCYSTIC OVARIAN SYNDROME): ICD-10-CM

## 2022-10-13 RX ORDER — METFORMIN HYDROCHLORIDE 750 MG/1
750 TABLET, EXTENDED RELEASE ORAL
Qty: 30 TABLET | Refills: 1 | Status: SHIPPED | OUTPATIENT
Start: 2022-10-13

## 2022-10-22 DIAGNOSIS — E28.2 PCOS (POLYCYSTIC OVARIAN SYNDROME): ICD-10-CM

## 2022-10-24 RX ORDER — SPIRONOLACTONE 25 MG/1
25 TABLET ORAL DAILY
Qty: 90 TABLET | Refills: 1 | Status: SHIPPED | OUTPATIENT
Start: 2022-10-24

## 2022-10-31 DIAGNOSIS — E66.01 OBESITY, CLASS III, BMI 40-49.9 (MORBID OBESITY) (HCC): ICD-10-CM

## 2022-10-31 DIAGNOSIS — E28.2 PCOS (POLYCYSTIC OVARIAN SYNDROME): ICD-10-CM

## 2022-10-31 DIAGNOSIS — K76.0 FATTY LIVER: ICD-10-CM

## 2022-10-31 DIAGNOSIS — R73.03 PREDIABETES: ICD-10-CM

## 2022-11-01 RX ORDER — METFORMIN HYDROCHLORIDE 750 MG/1
TABLET, EXTENDED RELEASE ORAL
Qty: 90 TABLET | Refills: 1 | Status: SHIPPED | OUTPATIENT
Start: 2022-11-01

## 2022-11-09 ENCOUNTER — TELEPHONE (OUTPATIENT)
Dept: BARIATRICS | Facility: CLINIC | Age: 37
End: 2022-11-09

## 2022-11-09 NOTE — TELEPHONE ENCOUNTER
Pt cancelled her appt for 11/10 viz pollo, indicating she still needs to get her bloodwork done  LM for her to call us back to r/s her appt as dr Nicole Chapa is now booking into the new year and she will have ample time to get her bw done by the time she has her next appt

## 2023-01-25 ENCOUNTER — TELEPHONE (OUTPATIENT)
Dept: BARIATRICS | Facility: CLINIC | Age: 38
End: 2023-01-25

## 2023-01-25 ENCOUNTER — APPOINTMENT (OUTPATIENT)
Dept: LAB | Facility: HOSPITAL | Age: 38
End: 2023-01-25

## 2023-01-25 ENCOUNTER — TELEMEDICINE (OUTPATIENT)
Dept: BARIATRICS | Facility: CLINIC | Age: 38
End: 2023-01-25

## 2023-01-25 VITALS — HEIGHT: 69 IN | BODY MASS INDEX: 43.4 KG/M2 | WEIGHT: 293 LBS

## 2023-01-25 DIAGNOSIS — R73.03 PREDIABETES: ICD-10-CM

## 2023-01-25 DIAGNOSIS — K76.0 FATTY LIVER: ICD-10-CM

## 2023-01-25 DIAGNOSIS — E66.01 OBESITY, CLASS III, BMI 40-49.9 (MORBID OBESITY) (HCC): ICD-10-CM

## 2023-01-25 DIAGNOSIS — F41.1 GENERALIZED ANXIETY DISORDER: ICD-10-CM

## 2023-01-25 DIAGNOSIS — E28.2 PCOS (POLYCYSTIC OVARIAN SYNDROME): ICD-10-CM

## 2023-01-25 DIAGNOSIS — E66.01 MORBID OBESITY (HCC): Primary | ICD-10-CM

## 2023-01-25 DIAGNOSIS — E87.6 HYPOKALEMIA: ICD-10-CM

## 2023-01-25 LAB
ALBUMIN SERPL BCP-MCNC: 3.6 G/DL (ref 3.5–5)
ALP SERPL-CCNC: 57 U/L (ref 46–116)
ALT SERPL W P-5'-P-CCNC: 59 U/L (ref 12–78)
ANION GAP SERPL CALCULATED.3IONS-SCNC: 7 MMOL/L (ref 4–13)
AST SERPL W P-5'-P-CCNC: 34 U/L (ref 5–45)
BILIRUB SERPL-MCNC: 0.46 MG/DL (ref 0.2–1)
BUN SERPL-MCNC: 10 MG/DL (ref 5–25)
CALCIUM SERPL-MCNC: 8.8 MG/DL (ref 8.3–10.1)
CHLORIDE SERPL-SCNC: 101 MMOL/L (ref 96–108)
CO2 SERPL-SCNC: 29 MMOL/L (ref 21–32)
CREAT SERPL-MCNC: 0.8 MG/DL (ref 0.6–1.3)
EST. AVERAGE GLUCOSE BLD GHB EST-MCNC: 128 MG/DL
GFR SERPL CREATININE-BSD FRML MDRD: 94 ML/MIN/1.73SQ M
GLUCOSE P FAST SERPL-MCNC: 104 MG/DL (ref 65–99)
HBA1C MFR BLD: 6.1 %
POTASSIUM SERPL-SCNC: 3.8 MMOL/L (ref 3.5–5.3)
PROT SERPL-MCNC: 7.9 G/DL (ref 6.4–8.4)
SODIUM SERPL-SCNC: 137 MMOL/L (ref 135–147)
TSH SERPL DL<=0.05 MIU/L-ACNC: 2.11 UIU/ML (ref 0.45–4.5)

## 2023-01-25 RX ORDER — POTASSIUM CHLORIDE 750 MG/1
CAPSULE, EXTENDED RELEASE ORAL
COMMUNITY
Start: 2022-12-02 | End: 2023-01-25

## 2023-01-25 RX ORDER — SPIRONOLACTONE 25 MG/1
25 TABLET ORAL DAILY
Qty: 90 TABLET | Refills: 1 | Status: SHIPPED | OUTPATIENT
Start: 2023-01-25

## 2023-01-25 RX ORDER — METFORMIN HYDROCHLORIDE 750 MG/1
750 TABLET, EXTENDED RELEASE ORAL
Qty: 90 TABLET | Refills: 1 | Status: SHIPPED | OUTPATIENT
Start: 2023-01-25

## 2023-01-25 NOTE — TELEPHONE ENCOUNTER
Elenita PANDA:  I submitted a PreAuthorization for WEGOVY to [address]  for [address] [place] [address] ([facility])  It is [employer] [facility] and they request up to 15 Days for a reply  Thank you

## 2023-01-25 NOTE — PROGRESS NOTES
Assessment/Plan:  Gaetano Peralta was seen today for follow-up  G  Diagnoses and all orders for this visit:  1  Morbid obesity ([address] )  Semaglutide-Weight Management (WEGOVY) 0 25 MG/0 5ML      2  BMI 45 0-49 9, adult (HCC)  Semaglutide-Weight Management (WEGOVY) 0 25 MG/0 5ML      3  PCOS (polycystic ovarian syndrome)  spironolactone (ALDACTONE) 25 mg tablet    metFORMIN (GLUCOPHAGE-XR) 750 mg 24 hr tablet      4  Prediabetes  metFORMIN (GLUCOPHAGE-XR) 750 mg 24 hr tablet      5  Obesity, Class III, BMI 40-49 9 (morbid obesity) (HCC)  metFORMIN (GLUCOPHAGE-XR) 750 mg 24 hr tablet      6  Fatty liver  metFORMIN (GLUCOPHAGE-XR) 750 mg 24 hr tablet        CMP normal K corrected continue Spironolactone and Metformin   Encourage mindful eating, portion control, motivational interview performed to help patient reach goals   Patient denies personal and family history of  pancreatitis, thyroid cancer, MEN-2 tumors  Denies any hx of glaucoma, seizures, kidney stones, gallstones  Had Hx of palpitations, on Metoprolol that helped- not a candidate for Phentermine unles can D/C Metoprolol  Denies uncontrolled anxiety or depression, insomnia or sleep disturbance  Discussed Krissy Landa, afraid to inject but will try   Encouraged to discuss bariatric surgery- does not want now  Will plan to do [facility] program  Component Ref Range & Units 10/22/20  8:45 AM 11/9/18  9:50 AM 3/8/18  9:48 AM   Hemoglobin A1C Normal 3 8-5 6%; PreDiabetic 5 7-6 4%; Diabetic >=6 5%; Glycemic control for adults with diabetes <7 0% % 6 3 High   5 6 R, CM  5 2         Total time spent: 24 minutes with >50%  face-to-face time spent counseling patient on nonsurgical interventions for the treatment of excess weight  Discussed the role of weight loss medications  Counseled patient on diet behavior and exercise modification for weight loss      Follow up in approximately 6 weeks    Subjective:   Chief Complaint   Patient presents with   • Follow-up Virtual Visit with Bariatrician   Patient located in [place] and consented to this virtual visit  I was in my office in private space and nobody else was present in the room  Subjective:   Chief Complaint   Patient presents with   • Follow-up     Virtual Visit with Bariatrician     Patient here to discuss weight associated problems and nutrition goals  HPI: Caleb Padilla  is a 40 y o  female with excess weight/obesity here to pursue weight management  Most recent notes and records were reviewed  Initial weight loss goal of 5-10% weight loss for improved health  Wt Readings from Last 10 Encounters:   01/25/23 (!) 141 kg (310 lb)   10/05/22 (!) 140 kg (309 lb)   09/29/22 (!) 138 kg (304 lb 14 4 oz)   09/28/22 (!) 139 kg (307 lb 3 2 oz)   09/27/22 (!) 139 kg (307 lb)   09/21/22 (!) 140 kg (308 lb 3 2 oz)   09/14/22 (!) 141 kg (311 lb)   09/07/22 (!) 141 kg (311 lb)   08/24/22 (!) 139 kg (307 lb)   08/17/22 (!) 140 kg (309 lb 9 6 oz)   Kaela Parker had 2 month  follow-up in the [employer]   She weighs  310 lbs giving her a loss of 3 4 lbs since starting  Initial weight:6/9/22 311lbs  Current weight:304lbs  Change in weight 310lbs    Food logging: none  Increased appetite/cravings: none after starting Metformin   Saw dietician   Hydration 70oz water       The following portions of the patient's history were reviewed and updated as appropriate: allergies, current medications, past family history, past medical history, past social history, past surgical history, and problem list       Review of Systems   Constitutional: Negative for activity chang_++ Fatigue  HENT: Negative for trouble swallowing  Respiratory: Negative for shortness of breath      Cardiovascular: Negative for chest pain, edema  Gastrointestinal: Negative for abdominal pain, nausea and vomiting, acid reflux, constipation/diarrhea  Psychiatric/Behavioral: Negative for behavioral problems , anxiety or depression  Does not sleep well    Objective:  Ht 5' 8 75" (1 746 m)   Wt (!) 141 kg (310 lb)   BMI 46 11 kg/m²    EN: awake, alert, non-diaphoretic, no psychomotor agitation, no acute distress    HEENT :Head: atraumatic, normocephalic, no rashes noted, no lesions noted  Eyes: NO redness, discharge, swelling, or lesions    Nose: NO redness, swelling, discharge, deformity, or impetigo/crusting    Skin: no lesions, wounds, erythema, or cyanosis noted on face or hands    Cardiopulmonary: no increased respiratory effort, speaking in clear sentences, I:E ratio WNL    MSK: normal ROM in the neck, Upper extremities, Lower extremities    Good ROM of hands, fist formation 100%, no obvious synovitis    Good ambulation in front of the camera    Neuro: cranial nerves grossly normal, speech normal rate and rhythm, orientation arrived to appointment on time with no prompting, moved both upper extremities equally    Pysch: appearance, behavior, and attitude- well groomed, pleasant, cooperative      Labs and Imaging  Recent labs and imaging have been personally reviewed    Lab Results   Component Value Date    WBC 7 80 03/31/2022    HGB 15 0 03/31/2022    HCT 42 1 03/31/2022    MCV 89 03/31/2022     03/31/2022     Lab Results   Component Value Date    SODIUM 137 01/25/2023    K 3 8 01/25/2023     01/25/2023    CO2 29 01/25/2023    AGAP 7 01/25/2023    BUN 10 01/25/2023    CREATININE 0 80 01/25/2023    GLUC 91 03/13/2018    GLUF 104 (H) 01/25/2023    CALCIUM 8 8 01/25/2023    AST 34 01/25/2023    ALT 59 01/25/2023    ALKPHOS 57 01/25/2023    TP 7 9 01/25/2023    TBILI 0 46 01/25/2023    EGFR 94 01/25/2023     Lab Results   Component Value Date    HGBA1C 6 3 (H) 10/22/2020     Lab Results   Component Value Date    IYI7MNOAZOGK 2 107 01/25/2023     Lab Results   Component Value Date    CHOLESTEROL 178 03/31/2022     Lab Results   Component Value Date    HDL 41 (L) 03/31/2022     Lab Results   Component Value Date    TRIG 141 03/31/2022     Lab Results   Component Value Date    LDLCALC 109 (H) 03/31/2022

## 2023-02-06 ENCOUNTER — TELEPHONE (OUTPATIENT)
Dept: BARIATRICS | Facility: CLINIC | Age: 38
End: 2023-02-06

## 2023-02-06 NOTE — TELEPHONE ENCOUNTER
[employer] APPROVED # [id number]  Good through 01/25/23 - 01/25/2024  Pharmacy informed  $ [phone]  Suggests coupon [employer] for coupon

## 2023-02-13 ENCOUNTER — OFFICE VISIT (OUTPATIENT)
Dept: BARIATRICS | Facility: CLINIC | Age: 38
End: 2023-02-13

## 2023-02-13 VITALS — WEIGHT: 293 LBS | BODY MASS INDEX: 43.4 KG/M2 | HEIGHT: 69 IN

## 2023-02-13 DIAGNOSIS — R63.5 ABNORMAL WEIGHT GAIN: Primary | ICD-10-CM

## 2023-02-13 NOTE — PROGRESS NOTES
Reevue indirect calorimter revealed REE is fast (+17%)  compared to the predictive normal for someone her same age, height, and gender  Reevue Indirect Calorimeter REE:  2506          Weight loss without exercise: 2506 - 3256          Weight loss with exercise:   + 251                   Maintenance:   2006 - 2506          Please note:  Pt finished the [employer] [facility] program, and never had her REE test done, so she had it today

## 2023-02-28 ENCOUNTER — OFFICE VISIT (OUTPATIENT)
Dept: FAMILY MEDICINE CLINIC | Facility: CLINIC | Age: 38
End: 2023-02-28

## 2023-02-28 VITALS
SYSTOLIC BLOOD PRESSURE: 122 MMHG | BODY MASS INDEX: 43.4 KG/M2 | OXYGEN SATURATION: 98 % | TEMPERATURE: 97.9 F | DIASTOLIC BLOOD PRESSURE: 82 MMHG | HEIGHT: 69 IN | HEART RATE: 71 BPM | WEIGHT: 293 LBS

## 2023-02-28 DIAGNOSIS — K60.2 ANAL FISSURE: Primary | ICD-10-CM

## 2023-02-28 NOTE — PROGRESS NOTES
Assessment/Plan:          Diagnoses and all orders for this visit:    Anal fissure      Tuck's pads after BM  Increase fiber in diet  Subjective:      Patient ID: Sharri Ferrara is a 40 y o  female  Pt has had some off and on blood on toilet paper for about 2 months  It had improved until recently when she had norovirus  Due to the diarrhea the bleeding increased  It has now decreased again with clearing of diarrhea  Pt has been using OTC wipes and hemorrhoid cream that has helped with pain and discomfort  The following portions of the patient's history were reviewed and updated as appropriate:   She has a past medical history of Anxiety, Anxiety, Hypertension, Obesity, and PCOS (polycystic ovarian syndrome)  ,  does not have any pertinent problems on file  ,   has a past surgical history that includes No past surgeries  ,  family history includes Alcohol abuse in her father and paternal aunt; Bipolar disorder in her paternal aunt; Breast cancer in her maternal grandmother and mother; Colon cancer in her paternal uncle; Diabetes in her paternal grandmother; Heart disease in her father; Heart failure in her father; Hypertension in her father; Stroke in her paternal uncle ,   reports that she has never smoked  She has never used smokeless tobacco  She reports current alcohol use  She reports that she does not use drugs  ,  is allergic to lactose - food allergy     Current Outpatient Medications   Medication Sig Dispense Refill   • amLODIPine (NORVASC) 5 mg tablet      • cholecalciferol (VITAMIN D3) 1,000 units tablet Take 1,000 Units by mouth daily     • lisinopril-hydrochlorothiazide (PRINZIDE,ZESTORETIC) 20-12 5 MG per tablet Take 1 tablet by mouth daily  1   • Magnesium 400 MG CAPS Take 400 mg by mouth daily      • metFORMIN (GLUCOPHAGE-XR) 750 mg 24 hr tablet Take 1 tablet (750 mg total) by mouth daily with dinner 90 tablet 1   • metoprolol succinate (TOPROL-XL) 50 mg 24 hr tablet Take 50 mg by mouth daily      • potassium chloride (Klor-Con) 10 mEq tablet      • spironolactone (ALDACTONE) 25 mg tablet Take 1 tablet (25 mg total) by mouth daily 90 tablet 1   • Semaglutide-Weight Management (WEGOVY) 0 25 MG/0 5ML Inject 0 5 mL (0 25 mg total) under the skin once a week (Patient not taking: Reported on 2/28/2023) 2 mL 1     No current facility-administered medications for this visit  Review of Systems   Gastrointestinal: Positive for anal bleeding, diarrhea and rectal pain  Objective:  Vitals:    02/28/23 1401   BP: 122/82   BP Location: Left arm   Patient Position: Sitting   Cuff Size: Large   Pulse: 71   Temp: 97 9 °F (36 6 °C)   TempSrc: Tympanic   SpO2: 98%   Weight: (!) 138 kg (305 lb)   Height: 5' 8 75" (1 746 m)     Body mass index is 45 37 kg/m²  Physical Exam  Vitals and nursing note reviewed  Constitutional:       Appearance: Normal appearance  Pulmonary:      Effort: Pulmonary effort is normal    Genitourinary:     Comments: Anal fissure noted at 10 o'clock with healing  Neurological:      Mental Status: She is alert

## 2023-02-28 NOTE — PATIENT INSTRUCTIONS
Anal Fissure   WHAT YOU NEED TO KNOW:   What is an anal fissure? An anal fissure is a cut or tear in the tissue inside your anus  An anal fissure may be acute or chronic  An acute anal fissure is usually small and shallow and often heals without treatment  A chronic fissure may last longer than a month and will usually require treatment  A chronic anal fissure comes back after treatment  What causes an anal fissure? Anal fissures may occur when your anal muscle becomes too tight  Your anal muscle forms a ring around your anus and helps control your bowel movements  When this muscle becomes too tight, there is decreased blood flow to your anus  You may also have too much pressure around your anus  Other possible causes may include any of the following: Bowel problems:  Constipation may cause you to strain, which may cause an anal fissure  Certain bowel diseases may also cause anal fissures, including Crohn disease and inflammatory bowel disease  Cancer:  Cancer in your anus may cause your anal tissue to tear  Leukemia is another cancer that may cause you to have an anal fissure  Treatments for cancer, such as chemotherapy, may also cause an anal fissure  Infections:  Certain infections, such as HIV, syphilis, and tuberculosis may increase your risk for an anal fissure  Trauma: The area around your anus may tear when you give birth  Anal trauma may also be caused by anal intercourse  What are the signs and symptoms of an anal fissure? Pain that lasts several hours around your anus after you have a bowel movement    Bleeding from your anus    Bright red blood in your bowel movement or spots of blood on your toilet paper    Pain while you urinate or have sex    Spasms in your anus    Itching around your anus    How is an anal fissure diagnosed? Your healthcare provider will ask about your symptoms and when they started   Tell your provider about your bowel movements, foods you eat, and medicines you take  Your provider may also ask if you have other medical conditions, or had anal procedures or surgeries  Your provider will look at and feel your anus to check for cuts or tears  If you are in severe pain, you may get local anesthesia  Your provider may also remove a piece of anal tissue and send it to a lab for testing  How is an anal fissure treated? You may also need to have the cause of your anal fissure treated  You may need any of the following to treat your anal fissure:  Home care:      Sitz bath: You may need to soak in a warm tub or take a sitz bath  A sitz bath may decrease your pain and relax your anal muscle  You may need to do this more than once a day  Ask your healthcare provider for information on how to use a sitz bath and how often you should bathe  Nutrition:  Eat foods that are high in fiber  This will help keep your bowel movements soft  High-fiber foods include fruits, vegetables, and whole grains  Drink more liquids:  Liquids may help soften your bowel movements  This will help prevent you from straining  Ask your healthcare provider how much liquid you should drink each day  Medicine:      Stool softeners: Your healthcare provider may also give you medicine that makes your bowel movements softer  This helps prevent constipation  You will be less likely to strain and cause an anal fissure if you are not constipated  Prescription pain medicine  may be given  Ask your healthcare provider how to take this medicine safely  Some prescription pain medicines contain acetaminophen  Do not take other medicines that contain acetaminophen without talking to your healthcare provider  Too much acetaminophen may cause liver damage  Prescription pain medicine may cause constipation  Ask your healthcare provider how to prevent or treat constipation  Topical medicine:  Topical medicine may be put just inside your anus   The medicine may help your anal muscle relax and increase blood flow to your anus  This medicine may contain anesthesia to help decrease your pain  Your healthcare provider will teach you the right way to use topical medicine  Botulinum toxin:  This is medicine given as a shot into the skin around your anus  It helps your anal muscle relax  Ask your healthcare provider for more information about botulinum toxin  Surgery: You may need surgery if other treatments do not work  You may also need surgery if your anal fissure is very painful  Surgery is often used for chronic anal fissures  The most common surgery is called a lateral internal sphincterotomy  A small part of your anal muscle is cut to help relax your anal muscle and decrease your pain  Your healthcare provider may remove all or some of your anal fissure  This is called a fissurectomy  What are the risks of an anal fissure? Topical medicine for your anal fissure may give you a headache or make you feel lightheaded  Your skin may become red and you may also have a burning feeling on your anus  The botulinum toxin shot may hurt and may cause muscle weakness  It may also cause a painful infection of the tissue covering your anal muscles  You may also be less able to feel the area in and around your anus  Botulinum toxin or surgery may make you unable to control your bowel movements or passing gas  This is called incontinence  Surgery may also cause bleeding, an infection, or injury to your anal tissue  Even with treatment, your anal fissure may occur again  Without treatment, your anal fissure may deepen or become infected  You may develop an abnormal opening from your anus to nearby organs  Scar tissue may form in your anus and cause it to become narrow  Without treatment, you may have worse pain during bowel movements  When should I contact my healthcare provider? You are unable to have a bowel movement  You have spasms in your anus that do not stop      When should I seek immediate care or call 911?   You have very bad pain in or around your anus  You have bleeding from your anus that does not stop  CARE AGREEMENT:   You have the right to help plan your care  Learn about your health condition and how it may be treated  Discuss treatment options with your healthcare providers to decide what care you want to receive  You always have the right to refuse treatment  The above information is an  only  It is not intended as medical advice for individual conditions or treatments  Talk to your doctor, nurse or pharmacist before following any medical regimen to see if it is safe and effective for you  © Copyright [employer] 2022 Information is for End User's use only and may not be sold, redistributed or otherwise used for commercial purposes
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
## 2023-03-30 ENCOUNTER — OFFICE VISIT (OUTPATIENT)
Dept: PSYCHIATRY | Facility: CLINIC | Age: 38
End: 2023-03-30

## 2023-03-30 DIAGNOSIS — F32.89 OTHER DEPRESSION: ICD-10-CM

## 2023-03-30 DIAGNOSIS — F41.1 GAD (GENERALIZED ANXIETY DISORDER): Primary | ICD-10-CM

## 2023-03-30 RX ORDER — SERTRALINE HYDROCHLORIDE 25 MG/1
25 TABLET, FILM COATED ORAL DAILY
Qty: 30 TABLET | Refills: 1 | Status: SHIPPED | OUTPATIENT
Start: 2023-03-30 | End: 2023-05-29

## 2023-03-30 NOTE — BH TREATMENT PLAN
TREATMENT PLAN (Medication Management Only)        [facility]    Name and Date of Birth:  Wilfredo Laird 54 y o  1985  Date of Treatment Plan: March 30, 2023  Diagnosis/Diagnoses:    1  DONATO (generalized anxiety disorder)    2  Other depression      Strengths/Personal Resources for Self-Care: ability to adapt to life changes, ability to communicate needs, ability to communicate well, ability to listen, ability to reason  Area/Areas of need (in own words): anxiety symptoms, depressive symptoms, attention and concentration problems  1  Long Term Goal: improve control of anxiety  Target Date:6 months - 9/30/2023  Person/Persons responsible for completion of goal: Osei Cortes  2  Short Term Objective (s) - How will we reach this goal?:   A  Provider new recommended medication/dosage changes and/or continue medication(s): start Zoloft   B  N/A   C  N/A  Target Date:6 months - 9/30/2023  Person/Persons Responsible for Completion of Goal: Osei Cortes  Progress Towards Goals: initiating treatment  Treatment Modality: medication management every 3 months  Review due 180 days from date of this plan: 6 months - 9/30/2023  Expected length of service: ongoing treatment  My Physician/PA/NP and I have developed this plan together and I agree to work on the goals and objectives  I understand the treatment goals that were developed for my treatment

## 2023-04-03 ENCOUNTER — OFFICE VISIT (OUTPATIENT)
Dept: BARIATRICS | Facility: CLINIC | Age: 38
End: 2023-04-03

## 2023-04-03 VITALS
HEART RATE: 91 BPM | BODY MASS INDEX: 43.4 KG/M2 | SYSTOLIC BLOOD PRESSURE: 118 MMHG | WEIGHT: 293 LBS | RESPIRATION RATE: 16 BRPM | HEIGHT: 69 IN | DIASTOLIC BLOOD PRESSURE: 82 MMHG

## 2023-04-03 DIAGNOSIS — K76.0 FATTY LIVER: ICD-10-CM

## 2023-04-03 DIAGNOSIS — R73.03 PREDIABETES: ICD-10-CM

## 2023-04-03 DIAGNOSIS — E28.2 PCOS (POLYCYSTIC OVARIAN SYNDROME): ICD-10-CM

## 2023-04-03 DIAGNOSIS — E66.01 OBESITY, CLASS III, BMI 40-49.9 (MORBID OBESITY) (HCC): Primary | ICD-10-CM

## 2023-04-03 DIAGNOSIS — L68.0 HIRSUTISM: ICD-10-CM

## 2023-04-03 RX ORDER — SPIRONOLACTONE 50 MG/1
50 TABLET, FILM COATED ORAL DAILY
Qty: 30 TABLET | Refills: 1 | Status: SHIPPED | OUTPATIENT
Start: 2023-04-03

## 2023-04-03 RX ORDER — POTASSIUM CHLORIDE 750 MG/1
CAPSULE, EXTENDED RELEASE ORAL
COMMUNITY
Start: 2023-03-24 | End: 2023-04-03 | Stop reason: ALTCHOICE

## 2023-04-03 NOTE — PROGRESS NOTES
Assessment/Plan:  Isabel Santos was seen today for follow-up  G  Diagnoses and all orders for this visit:  1  Obesity, Class III, BMI 40-49 9 (morbid obesity) (HCC)  Semaglutide-Weight Management (WEGOVY) 0 5 MG/0 5ML    Semaglutide-Weight Management (WEGOVY) 1 MG/0 5ML      2  Prediabetes  Semaglutide-Weight Management (WEGOVY) 1 MG/0 5ML    Comprehensive metabolic panel      3  PCOS (polycystic ovarian syndrome)  spironolactone (ALDACTONE) 50 mg tablet      4  Fatty liver  Semaglutide-Weight Management (WEGOVY) 1 MG/0 5ML    Comprehensive metabolic panel      5  Hirsutism          CMP normal K corrected continue Spironolactone and Metformin , Increase Spironolactone dose stop K suplementation  Encourage mindful eating, portion control, motivational interview performed to help patient reach goals   Patient denies personal and family history of  pancreatitis, thyroid cancer, MEN-2 tumors  Denies any hx of glaucoma, seizures, kidney stones, gallstones  Had Hx of palpitations, on Metoprolol that helped- not a candidate for Phentermine unles can D/C Metoprolol  Denies uncontrolled anxiety or depression, insomnia or sleep disturbance  Increase Wegovy dose , just started last week noticed decreased appetite  Encouraged to discuss bariatric surgery- does not want now  Will plan to do [facility] program  Component Ref Range & Units 10/22/20  8:45 AM 11/9/18  9:50 AM 3/8/18  9:48 AM   Hemoglobin A1C Normal 3 8-5 6%; PreDiabetic 5 7-6 4%; Diabetic >=6 5%; Glycemic control for adults with diabetes <7 0% % 6 3 High   5 6 R, CM  5 2       Follow up in approximately 6 weeks  Subjective:   Chief Complaint   Patient presents with   • Follow-up     Patient here to discuss weight associated problems and nutrition goals  HPI: Bita Gaona  is a 40 y o  female with excess weight/obesity here to pursue weight management  Most recent notes and records were reviewed    Initial weight loss goal of 5-10% weight loss for improved "health  Wt Readings from Last 10 Encounters:   04/03/23 (!) 139 kg (307 lb 3 2 oz)   02/28/23 (!) 138 kg (305 lb)   02/13/23 (!) 141 kg (310 lb 11 2 oz)   01/25/23 (!) 141 kg (310 lb)   10/05/22 (!) 140 kg (309 lb)   09/29/22 (!) 138 kg (304 lb 14 4 oz)   09/28/22 (!) 139 kg (307 lb 3 2 oz)   09/27/22 (!) 139 kg (307 lb)   09/21/22 (!) 140 kg (308 lb 3 2 oz)   09/14/22 (!) 141 kg (311 lb)   Christin Didi finished the [employer]     Reevue Indirect Calorimeter REE:  2506          Weight loss without exercise: 2506 - 3256          Weight loss with exercise:   + 251                   Maintenance:   2006 - 2506      Initial weight:6/9/22 311lbs  Last OV weight 310lbs on telemedicine visit   Just started Geannie Sermon last week, no side effects mild nausea first 2 days  Current weight:307lbs  Change in weight  -3lbs    Food logging: none  Increased appetite/cravings: none after starting Metformin   Having 3 meals per day  Protein shake at lunch  Hydration 70oz water and diet ice tea      The following portions of the patient's history were reviewed and updated as appropriate: allergies, current medications, past family history, past medical history, past social history, past surgical history, and problem list       Review of Systems   Constitutional: Negative for activity chang_++ Fatigue  HENT: Negative for trouble swallowing  Respiratory: Negative for shortness of breath      Cardiovascular: Negative for chest pain, edema  Gastrointestinal: Negative for abdominal pain, nausea and vomiting, acid reflux, constipation/diarrhea  Psychiatric/Behavioral: Negative for behavioral problems , anxiety or depression  Does not sleep well  Objective:  /82 (BP Location: Left arm, Patient Position: Sitting, Cuff Size: Large) Comment (BP Location): lower arm  Pulse 91   Resp 16   Ht 5' 8 75\" (1 746 m)   Wt (!) 139 kg (307 lb 3 2 oz)   BMI 45 70 kg/m²   Constitutional: Well-developed, well-nourished and Obese Body mass index is " 45 7 kg/m²  [facility] HEENT: No conjunctival injection  Pulmonary: No increased work of breathing or signs of respiratory distress  CV: Well-perfused, Regular rate and rhythm,  no edema  GI: increased abdominal girth  Non-distended  Endo: no ophthalmopathy, no dermopathy, truncal obesity  MSK: no sarcopenia  Neuro: Oriented to person, place and time  Normal Speech  Normal gait  Psych: Normal affect and mood  No delusion or hallucinations, normal thought process  Labs and Imaging  Recent labs and imaging have been personally reviewed    Lab Results   Component Value Date    WBC 7 80 03/31/2022    HGB 15 0 03/31/2022    HCT 42 1 03/31/2022    MCV 89 03/31/2022     03/31/2022     Lab Results   Component Value Date    SODIUM 137 01/25/2023    K 3 8 01/25/2023     01/25/2023    CO2 29 01/25/2023    AGAP 7 01/25/2023    BUN 10 01/25/2023    CREATININE 0 80 01/25/2023    GLUC 91 03/13/2018    GLUF 104 (H) 01/25/2023    CALCIUM 8 8 01/25/2023    AST 34 01/25/2023    ALT 59 01/25/2023    ALKPHOS 57 01/25/2023    TP 7 9 01/25/2023    TBILI 0 46 01/25/2023    EGFR 94 01/25/2023     Lab Results   Component Value Date    HGBA1C 6 1 (H) 01/25/2023     Lab Results   Component Value Date    YNU4IVUTJOSS 2 107 01/25/2023     Lab Results   Component Value Date    CHOLESTEROL 178 03/31/2022     Lab Results   Component Value Date    HDL 41 (L) 03/31/2022     Lab Results   Component Value Date    TRIG 141 03/31/2022     Lab Results   Component Value Date    LDLCALC 109 (H) 03/31/2022

## 2023-04-26 ENCOUNTER — SOCIAL WORK (OUTPATIENT)
Dept: BEHAVIORAL/MENTAL HEALTH CLINIC | Facility: CLINIC | Age: 38
End: 2023-04-26

## 2023-04-26 DIAGNOSIS — F32.89 OTHER DEPRESSION: ICD-10-CM

## 2023-04-26 DIAGNOSIS — F41.1 GENERALIZED ANXIETY DISORDER: Primary | ICD-10-CM

## 2023-04-26 NOTE — PSYCH
"Behavioral Health Psychotherapy Progress Note    Psychotherapy Provided: {PSYCHOTHERAPY:87744}    No diagnosis found  Goals addressed in session: {GOALS:53330}     DATA: ***  During this session, this clinician used the following therapeutic modalities: {Therapeutic Modalities:14213}    Substance Abuse {Was/was not:63010} addressed during this session  If the client is diagnosed with a co-occurring substance use disorder, please indicate any changes in the frequency or amount of use: ***  Stage of change for addressing substance use diagnoses: {Psych Substance Abuse Stage of Change:51828}    ASSESSMENT:  Randy Dixon presents with a {Psych/BH ZZIM:61366::\"OKDNVFNM/ normal\"} mood  her affect is {Psych/BH Affect:96041::\"Normal range and intensity\"}, which is {Congruent/Non Congruent:10397}, with her mood and the content of the session  The client {HAS/HAS NOT:20194} made progress on their goals  *** Randy Dixon presents with a {PSYCH Suicide/Homicide Risk:59424} risk of suicide, {PSYCH Suicide/Homicide Risk:83536} risk of self-harm, and {PSYCH Suicide/Homicide Risk:06149} risk of harm to others  For any risk assessment that surpasses a \"low\" rating, a safety plan must be developed  A safety plan was indicated: {YES/NO:20200}  If yes, describe in detail ***    PLAN: Between sessions, Randy Dixon will ***  At the next session, the therapist will use {Therapeutic Modalities:10307} to address ***  Behavioral Health Treatment Plan and Discharge Planning: Randy Dixon is aware of and agrees to continue to work on their treatment plan  They have identified and are working toward their discharge goals   {YES/NO:20200}    Visit start and stop times:    04/26/23     "
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
## 2023-04-27 ENCOUNTER — TELEPHONE (OUTPATIENT)
Dept: PSYCHIATRY | Facility: CLINIC | Age: 38
End: 2023-04-27

## 2023-04-27 NOTE — TELEPHONE ENCOUNTER
Patient has accepted the time slot of every other Tuesday at 11:00 am with Primo Figueroa for her scheduled appointments that will start on 5/30/23  Patient has several things she has to attend and can't fit in schedule until then

## 2023-04-28 NOTE — PSYCH
"This note was not shared with the patient due to reasonable likelihood of causing patient harm    PROGRESS NOTE        [facility]      Name and Date of Birth:  Rosaline Fleming 22 y o  1985    Date of Visit: 05/08/23    SUBJECTIVE:    Greg Jorgensen presents today for medication management and follow-up  At the last encounter she started Zoloft for anxiety and depression  She states \"I think it is helping a little bit with the depression  \"  She reports less lethargy and therefore taking less naps throughout the day  She feels that she has more energy  She reports that she is having some residual anxiety and states \"I am not sure if it is the medication  \"  She also started [facility] about 1 month ago for prediabetes and fatty liver  She was having some intermittent nausea when she first started the Zoloft  She does believe that the nausea might be due to the [facility]  She notes that this is improved with diet changes  She reports that her appetite has been slightly decreased likely due to [facility]  She is eating smaller portions for her meals  She has been sleeping well  Greg Jorgensen shares that she began seeing a psychotherapist within this office and that it has been going well  She denies suicidal ideation, intent or plan at present, has no suicidal ideation, intent or plan at present  She denies any auditory hallucinations and visual hallucinations, denies any other delusional thinking, denies any delusional thinking  She denies any side effects from medications      HPI ROS Appetite Changes and Sleep: normal appetite, normal sleep    Review Of Systems:      Constitutional Negative   ENT Negative   Cardiovascular Negative   Respiratory Negative   Gastrointestinal Negative   Genitourinary Negative   Musculoskeletal Negative   Integumentary Negative   Neurological Negative   Endocrine Negative   Other Symptoms Negative and None       Laboratory Results: No " results found for this or any previous visit      Substance Abuse History:    Social History     Substance and Sexual Activity   Drug Use No       Family Psychiatric History:     Family History   Problem Relation Age of Onset   • Breast cancer Mother    • Alcohol abuse Father    • Heart failure Father    • Heart disease Father    • Hypertension Father    • Breast cancer Maternal Grandmother    • Diabetes Paternal Grandmother    • Bipolar disorder Paternal Aunt    • Alcohol abuse Paternal Aunt    • Colon cancer Paternal Uncle    • Stroke Paternal Uncle    • Ovarian cancer Neg Hx    • Uterine cancer Neg Hx    • Cervical cancer Neg Hx    • Thyroid disease Neg Hx        The following portions of the patient's history were reviewed and updated as appropriate: past family history, past medical history, past social history, past surgical history and problem list     Social History     Socioeconomic History   • Marital status: Single     Spouse name: Not on file   • Number of children: Not on file   • Years of education: Not on file   • Highest education level: Not on file   Occupational History   • Not on file   Tobacco Use   • Smoking status: Never   • Smokeless tobacco: Never   Vaping Use   • Vaping Use: Never used   Substance and Sexual Activity   • Alcohol use: Yes     Comment: Social   • Drug use: No   • Sexual activity: Never     Birth control/protection: None   Other Topics Concern   • Not on file   Social History Narrative   • Not on file     Social Determinants of Health     Financial Resource Strain: Not on file   Food Insecurity: Not on file   Transportation Needs: Not on file   Physical Activity: Not on file   Stress: Not on file   Social Connections: Not on file   Intimate Partner Violence: Not on file   Housing Stability: Not on file     Social History     Social History Narrative   • Not on file        Social History     Tobacco History     Smoking Status  Never    Smokeless Tobacco Use  Never Alcohol History     Alcohol Use Status  Yes Comment  Social          Drug Use     Drug Use Status  No          Sexual Activity     Sexually Active  Never Birth Control/Protection  None          Activities of Daily Living    Not Asked                     OBJECTIVE:     Mental Status Evaluation:    Appearance age appropriate, casually dressed   Behavior pleasant, cooperative   Speech normal volume, normal pitch   Mood  euthymic, anxious at times   Affect  mood congruent   Thought Processes logical   Associations intact associations   Thought Content normal   Perceptual Disturbances: none   Abnormal Thoughts  Risk Potential Suicidal ideation - None  Homicidal ideation - None  Potential for aggression - No   Orientation oriented to person, place, time/date and situation   Memory recent and remote memory grossly intact   Cosciousness alert and awake   Attention Span attention span and concentration are age appropriate   Intellect Appears to be of Average Intelligence   Insight age appropriate    Judgement good    Muscle Strength and  Gait muscle strength and tone were normal   Language no difficulty naming common objects   Fund of Knowledge displays adequate knowledge of current events   Pain none   Pain Scale 0       Assessment/Plan:       Diagnoses and all orders for this visit:    DONATO (generalized anxiety disorder)  -     sertraline (Zoloft) 50 mg tablet; Take 1 tablet (50 mg total) by mouth daily    Other depression  -     sertraline (Zoloft) 50 mg tablet; Take 1 tablet (50 mg total) by mouth daily          Treatment Recommendations/Precautions: Will increase Zoloft to 50 mg daily for depressed mood and anxiety     We will follow-up in 1 month or sooner if questions or concerns arise  She is aware of emergent versus nonemergent mental health resources  She is able to contract for her own safety at this time  She will continue with her psychotherapist within this office [address] [place] [address]  Risks/Benefits      Risks, Benefits And Possible Side Effects Of Medications:    Risks, benefits, and possible side effects of medications explained to patient and patient verbalizes understanding and agreement for treatment      Controlled Medication Discussion:     Not applicable    Psychotherapy Provided:     Individual psychotherapy provided: No

## 2023-05-08 ENCOUNTER — OFFICE VISIT (OUTPATIENT)
Dept: PSYCHIATRY | Facility: CLINIC | Age: 38
End: 2023-05-08

## 2023-05-08 DIAGNOSIS — F41.1 GAD (GENERALIZED ANXIETY DISORDER): Primary | ICD-10-CM

## 2023-05-08 DIAGNOSIS — F32.89 OTHER DEPRESSION: ICD-10-CM

## 2023-05-30 ENCOUNTER — SOCIAL WORK (OUTPATIENT)
Dept: BEHAVIORAL/MENTAL HEALTH CLINIC | Facility: CLINIC | Age: 38
End: 2023-05-30

## 2023-05-30 DIAGNOSIS — F32.89 OTHER DEPRESSION: ICD-10-CM

## 2023-05-30 DIAGNOSIS — F41.1 GENERALIZED ANXIETY DISORDER: Primary | ICD-10-CM

## 2023-05-30 NOTE — BH TREATMENT PLAN
"Outpatient [address]  1985     Date of Initial Psychotherapy Assessment: 4/23/23   Date of Current Treatment Plan: 05/30/23  Treatment Plan Target Date: 11/30/23  Treatment Plan Expiration Date: 11/30/23    Diagnosis:   1  Generalized anxiety disorder        2  Other depression            Area(s) of Need: Anxiety, depression, people pleasing/poor boundaries    Long Term Goal 1 (in the client's own words): \"To not feel so anxious about the unknown  \"    Stage of Change: Contemplation    Target Date for completion: 11/30/23     Anticipated therapeutic modalities: CBT, Mindfulness Based, Psychodynamic     People identified to complete this goal: Manuel Lomax (client) and Pollo Brady (therapist)      Objective 1: (identify the means of measuring success in meeting the objective): Manuel Lomax will learn CBT construct  Objective 2: (identify the means of measuring success in meeting the objective): Manuel Lomax will learn how to identify thinking traps/ANT's and how to challenge change them  Objective 3:  Manuel Lomax will identify how anxiety shows up in her body and will learn 3 coping skills to manage anxiety  Long Term Goal 2 (in the client's own words): \"I want to be able to say no without feeling bad about myself  \"    Stage of Change: Preparation    Target Date for completion: 11/30/23     Anticipated therapeutic modalities: CBT, Structural Therapy, Psychodynamic     People identified to complete this goal: Manuel Lomax (client) and Pollo Brady (therapist)      Objective 1: (identify the means of measuring success in meeting the objective): Manuel Lomax will identify, challenge and change core negative belief about self that inhibits her from saying no  Objective 2: (identify the means of measuring success in meeting the objective): Manuel Lomax will practice boundary setting an ddistress tolerance using 3 calming skills       I am currently under the care of a [facility] psychiatric provider: " "yes    My [facility] psychiatric provider is: Carlos A Holly am currently taking psychiatric medications: Yes, as prescribed    I feel that I will be ready for discharge from mental health care when I reach the following (measurable goal/objective): \"When I can get through a day knowing how to to mentally and healthfully react to situations\" as reported 5 out of 6 times in sessions  For children and adults who have a legal guardian:   Has there been any change to custody orders and/or guardianship status? NA  If yes, attach updated documentation  I have not  created my Crisis Plan and have been offered a copy of this plan    [address]: Diagnosis and Treatment Plan explained to [employer] acknowledges an understanding of their diagnosis  [facility] agrees to this treatment plan      I have been offered a copy of this Treatment Plan  yes      "

## 2023-05-30 NOTE — PSYCH
"Behavioral Health Psychotherapy Progress Note    Psychotherapy Provided: Individual Psychotherapy     1  Generalized anxiety disorder        2  Other depression            Goals addressed in session: Goal 1 and Goal 2     DATA: Today's session was spent reviewing Alix's Life History Inventory which she completed and building Alix's treatment plan  Discussed paying attention to physical manifestations of anxiety as they show upin her body and asked her to note these for homework as well as practicing mindful breathing 3x per day to reduce overall stress  During this session, this clinician used the following therapeutic modalities: Mindfulness-based Strategies, Motivational Interviewing and Solution-Focused Therapy    Substance Abuse was not addressed during this session  If the client is diagnosed with a co-occurring substance use disorder, please indicate any changes in the frequency or amount of use: n/a  Stage of change for addressing substance use diagnoses: No substance use/Not applicable    ASSESSMENT:  Cornell Mota presents with a Anxious mood  her affect is Normal range and intensity, which is congruent, with her mood and the content of the session  The client has made progress on their goals  [place] was engaged and cooperative, oriented x3 with good eye contact  She had good insight and fair judgment with no evidence of psychosis  Cornell Mota presents with a none risk of suicide, none risk of self-harm, and none risk of harm to others  For any risk assessment that surpasses a \"low\" rating, a safety plan must be developed  A safety plan was indicated: no  If yes, describe in detail n/a    PLAN: Between sessions, Cornell Mota will take note of how anxiety manifests in physical sensations and practice mindful breathing 3x per day    At the next session, the therapist will use Cognitive Behavioral Therapy to address anxiety      Behavioral Health Treatment Plan and Discharge " Planning: Channing Bernal is aware of and agrees to continue to work on their treatment plan  They have identified and are working toward their discharge goals   yes    Visit start and stop times:    05/30/23  Start Time: 1104  Stop Time: 1156  Total Visit Time: 52 minutes

## 2023-06-07 ENCOUNTER — APPOINTMENT (OUTPATIENT)
Dept: LAB | Facility: HOSPITAL | Age: 38
End: 2023-06-07
Payer: COMMERCIAL

## 2023-06-07 DIAGNOSIS — R73.03 PREDIABETES: ICD-10-CM

## 2023-06-07 DIAGNOSIS — K76.0 FATTY LIVER: ICD-10-CM

## 2023-06-07 LAB
ALBUMIN SERPL BCP-MCNC: 4.5 G/DL (ref 3.5–5)
ALP SERPL-CCNC: 42 U/L (ref 34–104)
ALT SERPL W P-5'-P-CCNC: 33 U/L (ref 7–52)
ANION GAP SERPL CALCULATED.3IONS-SCNC: 5 MMOL/L (ref 4–13)
AST SERPL W P-5'-P-CCNC: 26 U/L (ref 13–39)
BILIRUB SERPL-MCNC: 0.61 MG/DL (ref 0.2–1)
BUN SERPL-MCNC: 14 MG/DL (ref 5–25)
CALCIUM SERPL-MCNC: 9.5 MG/DL (ref 8.4–10.2)
CHLORIDE SERPL-SCNC: 102 MMOL/L (ref 96–108)
CO2 SERPL-SCNC: 28 MMOL/L (ref 21–32)
CREAT SERPL-MCNC: 1.02 MG/DL (ref 0.6–1.3)
GFR SERPL CREATININE-BSD FRML MDRD: 70 ML/MIN/1.73SQ M
GLUCOSE P FAST SERPL-MCNC: 101 MG/DL (ref 65–99)
POTASSIUM SERPL-SCNC: 4 MMOL/L (ref 3.5–5.3)
PROT SERPL-MCNC: 7.9 G/DL (ref 6.4–8.4)
SODIUM SERPL-SCNC: 135 MMOL/L (ref 135–147)

## 2023-06-07 PROCEDURE — 36415 COLL VENOUS BLD VENIPUNCTURE: CPT

## 2023-06-07 PROCEDURE — 80053 COMPREHEN METABOLIC PANEL: CPT

## 2023-06-08 ENCOUNTER — OFFICE VISIT (OUTPATIENT)
Dept: BARIATRICS | Facility: CLINIC | Age: 38
End: 2023-06-08
Payer: COMMERCIAL

## 2023-06-08 VITALS
OXYGEN SATURATION: 98 % | RESPIRATION RATE: 18 BRPM | HEIGHT: 69 IN | HEART RATE: 73 BPM | WEIGHT: 293 LBS | SYSTOLIC BLOOD PRESSURE: 110 MMHG | DIASTOLIC BLOOD PRESSURE: 78 MMHG | BODY MASS INDEX: 43.4 KG/M2

## 2023-06-08 DIAGNOSIS — E28.2 PCOS (POLYCYSTIC OVARIAN SYNDROME): ICD-10-CM

## 2023-06-08 DIAGNOSIS — R73.03 PREDIABETES: ICD-10-CM

## 2023-06-08 DIAGNOSIS — E66.01 OBESITY, CLASS III, BMI 40-49.9 (MORBID OBESITY) (HCC): Primary | ICD-10-CM

## 2023-06-08 DIAGNOSIS — K76.0 FATTY LIVER: ICD-10-CM

## 2023-06-08 DIAGNOSIS — L68.0 HIRSUTISM: ICD-10-CM

## 2023-06-08 PROCEDURE — 99214 OFFICE O/P EST MOD 30 MIN: CPT | Performed by: FAMILY MEDICINE

## 2023-06-08 RX ORDER — METFORMIN HYDROCHLORIDE 750 MG/1
750 TABLET, EXTENDED RELEASE ORAL
Qty: 90 TABLET | Refills: 1 | Status: SHIPPED | OUTPATIENT
Start: 2023-06-08

## 2023-06-08 RX ORDER — SPIRONOLACTONE 100 MG/1
100 TABLET, FILM COATED ORAL DAILY
Qty: 90 TABLET | Refills: 1 | Status: SHIPPED | OUTPATIENT
Start: 2023-06-08

## 2023-06-08 NOTE — PROGRESS NOTES
Assessment/Plan:  Dana Rodriguez was seen today for follow-up  1  Obesity, Class III, BMI 40-49 9 (morbid obesity) ([address] )   continue meal planning as discussed  Monthly dietician meeting  Could not tolerate Wegovy at 0 5mg had violent nausea , able to tolerate only 0 25mg   Lost 25lbs with it but off now  - metFORMIN (GLUCOPHAGE-XR) 750 mg 24 hr tablet; Take 1 tablet (750 mg total) by mouth daily with dinner  Dispense: 90 tablet; Refill: 1  - spironolactone (ALDACTONE) 100 mg tablet; Take 1 tablet (100 mg total) by mouth daily  Dispense: 90 tablet; Refill: 1  - Basic metabolic panel; Future    2  Prediabetes     - metFORMIN (GLUCOPHAGE-XR) 750 mg 24 hr tablet; Take 1 tablet (750 mg total) by mouth daily with dinner  Dispense: 90 tablet; Refill: 1    3  PCOS (polycystic ovarian syndrome)   increase Spironolactone dose to 100mg , check renal fc in 1 mo  - metFORMIN (GLUCOPHAGE-XR) 750 mg 24 hr tablet; Take 1 tablet (750 mg total) by mouth daily with dinner  Dispense: 90 tablet; Refill: 1  - spironolactone (ALDACTONE) 100 mg tablet; Take 1 tablet (100 mg total) by mouth daily  Dispense: 90 tablet; Refill: 1  - Basic metabolic panel; Future    4  Fatty liver     - metFORMIN (GLUCOPHAGE-XR) 750 mg 24 hr tablet; Take 1 tablet (750 mg total) by mouth daily with dinner  Dispense: 90 tablet; Refill: 1    5  Hirsutism     - spironolactone (ALDACTONE) 100 mg tablet; Take 1 tablet (100 mg total) by mouth daily  Dispense: 90 tablet; Refill: 1CMP normal K corrected continue Spironolactone and Metformin , Increase Spironolactone dose stop K suplementation  Encourage mindful eating, portion control, motivational interview performed to help patient reach goals   Will plan to do [facility] program  Component Ref Range & Units 1/25/23  9:28 AM 10/22/20  8:45 AM 11/9/18  9:50 AM 3/8/18  9:48 AM   Hemoglobin A1C Normal 3 8-5 6%; PreDiabetic 5 7-6 4%;  Diabetic >=6 5%; Glycemic control for adults with diabetes <7 0% % 6 1 High   6 3 High   5 6 R, CM  5 2        Follow up in approximately 3 mo   Subjective:   Chief Complaint   Patient presents with   • Follow-up     2 month follow up     Patient here to discuss weight associated problems and nutrition goals  HPI: Nitza Patel  is a 40 y o  female with excess weight/obesity here to pursue weight management  Most recent notes and records were reviewed  Initial weight loss goal of 5-10% weight loss for improved health  Wt Readings from Last 10 Encounters:   06/08/23 133 kg (293 lb 4 8 oz)   04/03/23 (!) 139 kg (307 lb 3 2 oz)   02/28/23 (!) 138 kg (305 lb)   02/13/23 (!) 141 kg (310 lb 11 2 oz)   01/25/23 (!) 141 kg (310 lb)   10/05/22 (!) 140 kg (309 lb)   09/29/22 (!) 138 kg (304 lb 14 4 oz)   09/28/22 (!) 139 kg (307 lb 3 2 oz)   09/27/22 (!) 139 kg (307 lb)   09/21/22 (!) 140 kg (308 lb 3 2 oz)   Nitza Feliz finished the [employer]     [employer] Indirect Calorimeter REE:  2506          Weight loss without exercise: 2506 - 3256          Weight loss with exercise:   + 251                   Maintenance:   2006 - 2506      Initial weight:6/9/22 311lbs  Last OV weight 310lbs on telemedicine visit Wegovy used for low dose only , could not tolerate 0 5 mg     Current weight:307lbs  Change in weight  -3lbs    Food logging: none  Increased appetite/cravings: none after starting Metformin   Having 3 meals per day  Protein shake at lunch  Hydration 70oz water and diet ice tea      The following portions of the patient's history were reviewed and updated as appropriate: allergies, current medications, past family history, past medical history, past social history, past surgical history, and problem list       Review of Systems   Constitutional: Negative for activity chang_++ Fatigue  HENT: Negative for trouble swallowing  Respiratory: Negative for shortness of breath      Cardiovascular: Negative for chest pain, edema  Gastrointestinal: Negative for abdominal pain, nausea and vomiting, acid reflux, "constipation/diarrhea  Psychiatric/Behavioral: Negative for behavioral problems , anxiety or depression  Does not sleep well  Objective:  /78 (BP Location: Left arm, Patient Position: Sitting, Cuff Size: Adult)   Pulse 73   Resp 18   Ht 5' 8 75\" (1 746 m)   Wt 133 kg (293 lb 4 8 oz)   SpO2 98%   BMI 43 63 kg/m²   Constitutional: Well-developed, well-nourished and Obese Body mass index is 43 63 kg/m²  Juan [facility] Kruse HEENT: No conjunctival pallor or jaundice  Pulmonary: No increased work of breathing or signs of respiratory distress  CV: well perfused  GI: Obese  Non-distended   MSK: No edema   Neuro: Oriented to person, place and time  Normal Speech  Normal gait  Psych: Normal affect and mood  Normal thought process no delusions  Labs and Imaging  Recent labs and imaging have been personally reviewed    Lab Results   Component Value Date    HCT 42 1 03/31/2022    HGB 15 0 03/31/2022    MCV 89 03/31/2022     03/31/2022    WBC 7 80 03/31/2022     Lab Results   Component Value Date    AGAP 5 06/07/2023    ALKPHOS 42 06/07/2023    ALT 33 06/07/2023    AST 26 06/07/2023    BUN 14 06/07/2023    CALCIUM 9 5 06/07/2023     06/07/2023    CO2 28 06/07/2023    CREATININE 1 02 06/07/2023    EGFR 70 06/07/2023    GLUC 91 03/13/2018    GLUF 101 (H) 06/07/2023    K 4 0 06/07/2023    SODIUM 135 06/07/2023    TBILI 0 61 06/07/2023    TP 7 9 06/07/2023     Lab Results   Component Value Date    HGBA1C 6 1 (H) 01/25/2023     Lab Results   Component Value Date    HBR2DIOHGCOV 2 107 01/25/2023     Lab Results   Component Value Date    CHOLESTEROL 178 03/31/2022     Lab Results   Component Value Date    HDL 41 (L) 03/31/2022     Lab Results   Component Value Date    TRIG 141 03/31/2022     Lab Results   Component Value Date    LDLCALC 109 (H) 03/31/2022     " Opt out

## 2023-06-12 ENCOUNTER — TELEPHONE (OUTPATIENT)
Dept: PSYCHIATRY | Facility: CLINIC | Age: 38
End: 2023-06-12

## 2023-06-14 ENCOUNTER — SOCIAL WORK (OUTPATIENT)
Dept: BEHAVIORAL/MENTAL HEALTH CLINIC | Facility: CLINIC | Age: 38
End: 2023-06-14

## 2023-06-14 DIAGNOSIS — F41.1 GENERALIZED ANXIETY DISORDER: Primary | ICD-10-CM

## 2023-06-14 DIAGNOSIS — F32.89 OTHER DEPRESSION: ICD-10-CM

## 2023-06-18 NOTE — PSYCH
Behavioral Health Psychotherapy Progress Note    Psychotherapy Provided: Individual Psychotherapy     1  Generalized anxiety disorder        2  Other depression            Goals addressed in session: Goal 1     DATA: Romy Ayon reported that she noticed that she feels different in different situations, noting that at night she bites her nails and was surprised to notice that she was feeling anxious  Idenfied it in her shoulders  She also reported that she notices a race of painc through her body, with the thought that she or someone else is in danger  She also reported that she will randomly feel nauseous, warm and it is hard to breathe, which for her is the most debilitating  Went over doing progressive muscle relaxation when she is biting hernails as well as a fidget/tactile object which was given to her  Went over 5 senses and naming the colors to help get out of fight/flight which was also explained  Identified pattern in childhood of not knowing how her fathr would be and educated on [employer] patterns with hypervigilance  Reviewed belly breathing to be practiced 3x day to lower overall level  During this session, this clinician used the following therapeutic modalities: Cognitive Behavioral Therapy, Mindfulness-based Strategies and Supportive Psychotherapy    Substance Abuse was not addressed during this session  If the client is diagnosed with a co-occurring substance use disorder, please indicate any changes in the frequency or amount of use: n/a  Stage of change for addressing substance use diagnoses: No substance use/Not applicable    ASSESSMENT:  Caron Terry presents with a Anxious mood  her affect is Normal range and intensity, which is congruent, with her mood and the content of the session  The client has made progress on their goals  Romy Ayon was engaged and cooperative, oriented x3 with good eye contact  She had fair insight and judgment with normal thought content and processes   Luke Javier "Ashu Villalobos presents with a none risk of suicide, none risk of self-harm, and none risk of harm to others  For any risk assessment that surpasses a \"low\" rating, a safety plan must be developed  A safety plan was indicated: no  If yes, describe in detail n/a    PLAN: Between sessions, Dewane Goltz will practice belly breathing and other mindful based strategies    At the next session, the therapist will use Cognitive Behavioral Therapy to address anxiety  Behavioral Health Treatment Plan and Discharge Planning: Dewane Goltz is aware of and agrees to continue to work on their treatment plan  They have identified and are working toward their discharge goals   yes    Visit start and stop times:    6/14/23  Start Time: 8383  Stop Time: 1250  Total Visit Time: 46 minutes  "

## 2023-06-26 ENCOUNTER — TELEPHONE (OUTPATIENT)
Dept: PSYCHIATRY | Facility: CLINIC | Age: 38
End: 2023-06-26

## 2023-06-26 NOTE — TELEPHONE ENCOUNTER
Called patient but had to leave mkessage on patient's voicemail to cancel appointment with Guanakito Horton for tomorrow, 6/27/2023 at 11:00 am due to provider out of office & confirmed next appointment

## 2023-07-25 ENCOUNTER — TELEPHONE (OUTPATIENT)
Dept: PSYCHIATRY | Facility: CLINIC | Age: 38
End: 2023-07-25

## 2023-07-25 NOTE — TELEPHONE ENCOUNTER
Patient is calling regarding cancelling an appointment.    Date/Time: 07/25/23     Reason: Have to work    Patient was rescheduled: YES [] NO [x]  If yes, when was Patient reschedule for: Pt has recurring appts    Patient requesting call back to reschedule: YES [] NO [x]

## 2023-08-31 DIAGNOSIS — K76.0 FATTY LIVER: ICD-10-CM

## 2023-08-31 DIAGNOSIS — E66.01 OBESITY, CLASS III, BMI 40-49.9 (MORBID OBESITY) (HCC): ICD-10-CM

## 2023-08-31 DIAGNOSIS — R73.03 PREDIABETES: ICD-10-CM

## 2023-08-31 DIAGNOSIS — E28.2 PCOS (POLYCYSTIC OVARIAN SYNDROME): ICD-10-CM

## 2023-08-31 RX ORDER — METFORMIN HYDROCHLORIDE 750 MG/1
750 TABLET, EXTENDED RELEASE ORAL
Qty: 90 TABLET | Refills: 0 | Status: CANCELLED | OUTPATIENT
Start: 2023-08-31

## 2023-09-14 ENCOUNTER — APPOINTMENT (OUTPATIENT)
Dept: LAB | Facility: HOSPITAL | Age: 38
End: 2023-09-14
Payer: COMMERCIAL

## 2023-09-14 ENCOUNTER — OFFICE VISIT (OUTPATIENT)
Dept: FAMILY MEDICINE CLINIC | Facility: CLINIC | Age: 38
End: 2023-09-14
Payer: COMMERCIAL

## 2023-09-14 VITALS
BODY MASS INDEX: 43.4 KG/M2 | OXYGEN SATURATION: 98 % | DIASTOLIC BLOOD PRESSURE: 82 MMHG | HEART RATE: 62 BPM | HEIGHT: 69 IN | WEIGHT: 293 LBS | TEMPERATURE: 96.1 F | SYSTOLIC BLOOD PRESSURE: 128 MMHG

## 2023-09-14 DIAGNOSIS — F41.1 GENERALIZED ANXIETY DISORDER: Primary | ICD-10-CM

## 2023-09-14 DIAGNOSIS — F41.1 GAD (GENERALIZED ANXIETY DISORDER): ICD-10-CM

## 2023-09-14 DIAGNOSIS — E28.2 PCOS (POLYCYSTIC OVARIAN SYNDROME): ICD-10-CM

## 2023-09-14 DIAGNOSIS — E66.01 OBESITY, CLASS III, BMI 40-49.9 (MORBID OBESITY) (HCC): ICD-10-CM

## 2023-09-14 DIAGNOSIS — F33.0 MILD EPISODE OF RECURRENT MAJOR DEPRESSIVE DISORDER (HCC): ICD-10-CM

## 2023-09-14 DIAGNOSIS — F32.89 OTHER DEPRESSION: ICD-10-CM

## 2023-09-14 DIAGNOSIS — Z00.8 HEALTH EXAMINATION IN POPULATION SURVEY: ICD-10-CM

## 2023-09-14 LAB
ANION GAP SERPL CALCULATED.3IONS-SCNC: 5 MMOL/L
BUN SERPL-MCNC: 14 MG/DL (ref 5–25)
CALCIUM SERPL-MCNC: 9.7 MG/DL (ref 8.4–10.2)
CHLORIDE SERPL-SCNC: 105 MMOL/L (ref 96–108)
CHOLEST SERPL-MCNC: 168 MG/DL
CO2 SERPL-SCNC: 26 MMOL/L (ref 21–32)
CREAT SERPL-MCNC: 1.03 MG/DL (ref 0.6–1.3)
GFR SERPL CREATININE-BSD FRML MDRD: 69 ML/MIN/1.73SQ M
GLUCOSE P FAST SERPL-MCNC: 85 MG/DL (ref 65–99)
HDLC SERPL-MCNC: 46 MG/DL
LDLC SERPL CALC-MCNC: 94 MG/DL (ref 0–100)
NONHDLC SERPL-MCNC: 122 MG/DL
POTASSIUM SERPL-SCNC: 4.8 MMOL/L (ref 3.5–5.3)
SODIUM SERPL-SCNC: 136 MMOL/L (ref 135–147)
TRIGL SERPL-MCNC: 138 MG/DL

## 2023-09-14 PROCEDURE — 99214 OFFICE O/P EST MOD 30 MIN: CPT | Performed by: PHYSICIAN ASSISTANT

## 2023-09-14 PROCEDURE — 83036 HEMOGLOBIN GLYCOSYLATED A1C: CPT

## 2023-09-14 PROCEDURE — 80061 LIPID PANEL: CPT

## 2023-09-14 PROCEDURE — 36415 COLL VENOUS BLD VENIPUNCTURE: CPT

## 2023-09-14 PROCEDURE — 80048 BASIC METABOLIC PNL TOTAL CA: CPT

## 2023-09-14 RX ORDER — SERTRALINE HYDROCHLORIDE 100 MG/1
100 TABLET, FILM COATED ORAL DAILY
Qty: 30 TABLET | Refills: 1 | Status: SHIPPED | OUTPATIENT
Start: 2023-09-14 | End: 2023-11-13

## 2023-09-14 NOTE — PROGRESS NOTES
Assessment/Plan:          Diagnoses and all orders for this visit:    Generalized anxiety disorder    Mild episode of recurrent major depressive disorder ([address])        Pt will let me know the current dose of her medication. I will increase it from there. She is to observe skin cyst on breast and if changing she is to let me know. Subjective:      Patient ID: Naveen Torres is a 45 y.o. female. Pt is here with 2 complaints. She is feeling her Zoloft is not working as well as it was. She would like to increase the dose. She cannot remember if she is on 50 mg or 100 mg. She will let me know. She also has a "bump" on her left breast that has been present for about a year and is concerned. It has not changed in size. The following portions of the patient's history were reviewed and updated as appropriate:   She has a past medical history of Anxiety, Anxiety, Hypertension, Obesity, and PCOS (polycystic ovarian syndrome). ,  does not have any pertinent problems on file. ,   has a past surgical history that includes No past surgeries. ,  family history includes Alcohol abuse in her father and paternal aunt; Bipolar disorder in her paternal aunt; Breast cancer in her maternal grandmother and mother; Colon cancer in her paternal uncle; Diabetes in her paternal grandmother; Heart disease in her father; Heart failure in her father; Hypertension in her father; Stroke in her paternal uncle.,   reports that she has never smoked. She has never used smokeless tobacco. She reports current alcohol use. She reports that she does not use drugs. ,  is allergic to lactose - food allergy. .  Current Outpatient Medications   Medication Sig Dispense Refill   • amLODIPine (NORVASC) 5 mg tablet      • cholecalciferol (VITAMIN D3) 1,000 units tablet Take 1,000 Units by mouth daily     • lisinopril-hydrochlorothiazide (PRINZIDE,ZESTORETIC) 20-12.5 MG per tablet Take 1 tablet by mouth daily  1   • Magnesium 400 MG CAPS Take 400 mg by mouth daily      • metFORMIN (GLUCOPHAGE-XR) 750 mg 24 hr tablet Take 1 tablet (750 mg total) by mouth daily with dinner 90 tablet 1   • metoprolol succinate (TOPROL-XL) 50 mg 24 hr tablet Take 50 mg by mouth daily      • spironolactone (ALDACTONE) 100 mg tablet Take 1 tablet (100 mg total) by mouth daily 90 tablet 1   • sertraline (Zoloft) 50 mg tablet Take 1 tablet (50 mg total) by mouth daily 30 tablet 1     No current facility-administered medications for this visit. Review of Systems   Constitutional: Negative for chills and diaphoresis. Gastrointestinal: Negative for abdominal pain, constipation, diarrhea and nausea. Genitourinary: Negative for difficulty urinating and dysuria. Breast lesion   Psychiatric/Behavioral: Positive for decreased concentration. Negative for dysphoric mood, self-injury and suicidal ideas. The patient is nervous/anxious. Objective:  Vitals:    09/14/23 1046   BP: 128/82   BP Location: Left arm   Patient Position: Sitting   Cuff Size: Large   Pulse: 62   Temp: (!) 96.1 °F (35.6 °C)   TempSrc: Tympanic   SpO2: 98%   Weight: 133 kg (294 lb)   Height: 5' 8.75" (1.746 m)     Body mass index is 43.73 kg/m². Physical Exam  Vitals and nursing note reviewed. Constitutional:       Appearance: Normal appearance. Cardiovascular:      Rate and Rhythm: Normal rate. Pulmonary:      Effort: Pulmonary effort is normal.   Chest:       Neurological:      Mental Status: She is alert and oriented to person, place, and time. Psychiatric:         Attention and Perception: Attention normal.         Mood and Affect: Affect normal. Mood is anxious. Speech: Speech normal.         Behavior: Behavior is cooperative. Thought Content:  Thought content normal.         Judgment: Judgment normal.

## 2023-09-15 LAB
EST. AVERAGE GLUCOSE BLD GHB EST-MCNC: 114 MG/DL
HBA1C MFR BLD: 5.6 %

## 2023-10-18 ENCOUNTER — OFFICE VISIT (OUTPATIENT)
Dept: FAMILY MEDICINE CLINIC | Facility: CLINIC | Age: 38
End: 2023-10-18

## 2023-10-18 VITALS
TEMPERATURE: 98 F | SYSTOLIC BLOOD PRESSURE: 138 MMHG | WEIGHT: 293 LBS | DIASTOLIC BLOOD PRESSURE: 88 MMHG | HEART RATE: 81 BPM | BODY MASS INDEX: 43.4 KG/M2 | HEIGHT: 69 IN | OXYGEN SATURATION: 95 %

## 2023-10-18 DIAGNOSIS — Z00.00 ANNUAL PHYSICAL EXAM: Primary | ICD-10-CM

## 2023-10-18 NOTE — PATIENT INSTRUCTIONS
Wellness Visit for Adults   AMBULATORY CARE:   A wellness visit  is when you see your healthcare provider to get screened for health problems. Your healthcare provider will also give you advice on how to stay healthy. Write down your questions so you remember to ask them. Ask your healthcare provider how often you should have a wellness visit. What happens at a wellness visit:  Your healthcare provider will ask about your health, and your family history of health problems. This includes high blood pressure, heart disease, and cancer. He or she will ask if you have symptoms that concern you, if you smoke, and about your mood. You may also be asked about your intake of medicines, supplements, food, and alcohol. Any of the following may be done: Your weight  will be checked. Your height may also be checked so your body mass index (BMI) can be calculated. Your BMI shows if you are at a healthy weight. Your blood pressure  and heart rate will be checked. Your temperature may also be checked. Blood and urine tests  may be done. Blood tests may be done to check your cholesterol levels. Abnormal cholesterol levels increase your risk for heart disease and stroke. You may also need a blood or urine test to check for diabetes if you are at increased risk. Urine tests may be done to look for signs of an infection or kidney disease. A physical exam  includes checking your heartbeat and lungs with a stethoscope. Your healthcare provider may also check your skin to look for sun damage. Screening tests  may be recommended. A screening test is done to check for diseases that may not cause symptoms. The screening tests you may need depend on your age, gender, family history, and lifestyle habits. For example, colorectal screening may be recommended if you are 48years old or older. Screening tests you need if you are a woman:   A Pap smear  is used to screen for cervical cancer.  Pap smears are usually done every 3 to 5 years depending on your age. You may need them more often if you have had abnormal Pap smear test results in the past. Ask your healthcare provider how often you should have a Pap smear. A mammogram  is an x-ray of your breasts to screen for breast cancer. Experts recommend mammograms every 2 years starting at age 48 years. You may need a mammogram at age 52 years or younger if you have an increased risk for breast cancer. Talk to your healthcare provider about when you should start having mammograms and how often you need them. Vaccines you may need:   Get an influenza vaccine  every year. The influenza vaccine protects you from the flu. Several types of viruses cause the flu. The viruses change over time, so new vaccines are made each year. Get a tetanus-diphtheria (Td) booster vaccine  every 10 years. This vaccine protects you against tetanus and diphtheria. Tetanus is a severe infection that may cause painful muscle spasms and lockjaw. Diphtheria is a severe bacterial infection that causes a thick covering in the back of your mouth and throat. Get a human papillomavirus (HPV) vaccine  if you are female and aged 23 to 32 or male 23 to 24 and never received it. This vaccine protects you from HPV infection. HPV is the most common infection spread by sexual contact. HPV may also cause vaginal, penile, and anal cancers. Get a pneumococcal vaccine  if you are aged 72 years or older. The pneumococcal vaccine is an injection given to protect you from pneumococcal disease. Pneumococcal disease is an infection caused by pneumococcal bacteria. The infection may cause pneumonia, meningitis, or an ear infection. Get a shingles vaccine  if you are 60 or older, even if you have had shingles before. The shingles vaccine is an injection to protect you from the varicella-zoster virus. This is the same virus that causes chickenpox.  Shingles is a painful rash that develops in people who had chickenpox or have been exposed to the virus. How to eat healthy:  My Plate is a model for planning healthy meals. It shows the types and amounts of foods that should go on your plate. Fruits and vegetables make up about half of your plate, and grains and protein make up the other half. A serving of dairy is included on the side of your plate. The amount of calories and serving sizes you need depends on your age, gender, weight, and height. Examples of healthy foods are listed below:  Eat a variety of vegetables  such as dark green, red, and orange vegetables. You can also include canned vegetables low in sodium (salt) and frozen vegetables without added butter or sauces. Eat a variety of fresh fruits , canned fruit in 100% juice, frozen fruit, and dried fruit. Include whole grains. At least half of the grains you eat should be whole grains. Examples include whole-wheat bread, wheat pasta, brown rice, and whole-grain cereals such as oatmeal.    Eat a variety of protein foods such as seafood (fish and shellfish), lean meat, and poultry without skin (turkey and chicken). Examples of lean meats include pork leg, shoulder, or tenderloin, and beef round, sirloin, tenderloin, and extra lean ground beef. Other protein foods include eggs and egg substitutes, beans, peas, soy products, nuts, and seeds. Choose low-fat dairy products such as skim or 1% milk or low-fat yogurt, cheese, and cottage cheese. Limit unhealthy fats  such as butter, hard margarine, and shortening. Exercise:  Exercise at least 30 minutes per day on most days of the week. Some examples of exercise include walking, biking, dancing, and swimming. You can also fit in more physical activity by taking the stairs instead of the elevator or parking farther away from stores. Include muscle strengthening activities 2 days each week. Regular exercise provides many health benefits.  It helps you manage your weight, and decreases your risk for type 2 diabetes, heart disease, stroke, and high blood pressure. Exercise can also help improve your mood. Ask your healthcare provider about the best exercise plan for you. General health and safety guidelines:   Do not smoke. Nicotine and other chemicals in cigarettes and cigars can cause lung damage. Ask your healthcare provider for information if you currently smoke and need help to quit. E-cigarettes or smokeless tobacco still contain nicotine. Talk to your healthcare provider before you use these products. Limit alcohol. A drink of alcohol is 12 ounces of beer, 5 ounces of wine, or 1½ ounces of liquor. Lose weight, if needed. Being overweight increases your risk of certain health conditions. These include heart disease, high blood pressure, type 2 diabetes, and certain types of cancer. Protect your skin. Do not sunbathe or use tanning beds. Use sunscreen with a SPF 15 or higher. Apply sunscreen at least 15 minutes before you go outside. Reapply sunscreen every 2 hours. Wear protective clothing, hats, and sunglasses when you are outside. Drive safely. Always wear your seatbelt. Make sure everyone in your car wears a seatbelt. A seatbelt can save your life if you are in an accident. Do not use your cell phone when you are driving. This could distract you and cause an accident. Pull over if you need to make a call or send a text message. Practice safe sex. Use latex condoms if are sexually active and have more than one partner. Your healthcare provider may recommend screening tests for sexually transmitted infections (STIs). Wear helmets, lifejackets, and protective gear. Always wear a helmet when you ride a bike or motorcycle, go skiing, or play sports that could cause a head injury. Wear protective equipment when you play sports. Wear a lifejacket when you are on a boat or doing water sports.     © Copyright [facility] 2023 Information is for End User's use only and may not be sold, redistributed or otherwise used for commercial purposes. The above information is an  only. It is not intended as medical advice for individual conditions or treatments. Talk to your doctor, nurse or pharmacist before following any medical regimen to see if it is safe and effective for you. Cholesterol and Your Health   AMBULATORY CARE:   Cholesterol  is a waxy, fat-like substance. Your body uses cholesterol to make hormones and new cells, and to protect nerves. Cholesterol is made by your body. It also comes from certain foods you eat, such as meat and dairy products. Your healthcare provider can help you set goals for your cholesterol levels. Your provider can help you create a plan to meet your goals. Cholesterol level goals: Your cholesterol level goals depend on your risk for heart disease, your age, and your other health conditions. The following are general guidelines: Total cholesterol  includes low-density lipoprotein (LDL), high-density lipoprotein (HDL), and triglyceride levels. The total cholesterol level should be lower than 200 mg/dL and is best at about 150 mg/dL. LDL cholesterol  is called bad cholesterol  because it forms plaque in your arteries. As plaque builds up, your arteries become narrow, and less blood flows through. When plaque decreases blood flow to your heart, you may have chest pain. If plaque completely blocks an artery that brings blood to your heart, you may have a heart attack. Plaque can break off and form blood clots. Blood clots may block arteries in your brain and cause a stroke. The level should be less than 130 mg/dL and is best at about 100 mg/dL. HDL cholesterol  is called good cholesterol  because it helps remove LDL cholesterol from your arteries. It does this by attaching to LDL cholesterol and carrying it to your liver. Your liver breaks down LDL cholesterol so your body can get rid of it.  High levels of HDL cholesterol can help prevent a heart attack and stroke. Low levels of HDL cholesterol can increase your risk for heart disease, heart attack, and stroke. The level should be at least 40 mg/dL in males or at least 50 mg/dL in females. Triglycerides  are a type of fat that store energy from foods you eat. High levels of triglycerides also cause plaque buildup. This can increase your risk for a heart attack or stroke. If your triglyceride level is high, your LDL cholesterol level may also be high. The level should be less than 150 mg/dL. Any of the following can increase your risk for high cholesterol:   Smoking or drinking large amounts of alcohol    Having overweight or obesity, or not getting enough exercise    A medical condition such as hypertension (high blood pressure) or diabetes    A family history of high cholesterol    Age older than 72    What you need to know about having your cholesterol levels checked: Adults 21to 39years of age should have their cholesterol levels checked every 4 to 6 years. Adults 45 years or older should have their cholesterol checked every 1 to 2 years. You may need your cholesterol checked more often, or at a younger age, if you have risk factors for heart disease. You may also need to have your cholesterol checked more often if you have other health conditions, such as diabetes. Blood tests are used to check cholesterol levels. Blood tests measure your levels of triglycerides, LDL cholesterol, and HDL cholesterol. How healthy fats affect your cholesterol levels:  Healthy fats, also called unsaturated fats, help lower LDL cholesterol and triglyceride levels. Healthy fats include the following:  Monounsaturated fats  are found in foods such as olive oil, canola oil, avocado, nuts, and olives. Polyunsaturated fats,  such as omega 3 fats, are found in fish, such as salmon, trout, and tuna. They can also be found in plant foods such as flaxseed, walnuts, and soybeans.     How unhealthy fats affect your cholesterol levels: Unhealthy fats increase LDL cholesterol and triglyceride levels. They are found in foods high in cholesterol, saturated fat, and trans fat:  Cholesterol  is found in eggs, dairy, and meat. Saturated fat  is found in butter, cheese, ice cream, whole milk, and coconut oil. Saturated fat is also found in meat, such as sausage, hot dogs, and bologna. Trans fat  is found in liquid oils and is used in fried and baked foods. Foods that contain trans fats include chips, crackers, muffins, sweet rolls, microwave popcorn, and cookies. Treatment  for high cholesterol will also decrease your risk of heart disease, heart attack, and stroke. Treatment may include any of the following:  Lifestyle changes  may include food, exercise, weight loss, and quitting smoking. You may also need to decrease the amount of alcohol you drink. Your healthcare provider will want you to start with lifestyle changes. Other treatment may be added if lifestyle changes are not enough. Your healthcare provider may recommend you work with a team to manage hyperlipidemia. The team may include medical experts such as a dietitian, an exercise or physical therapist, and a behavior therapist. Your family members may be included in helping you create lifestyle changes. Medicines  may be given to lower your LDL cholesterol, triglyceride levels, or total cholesterol level. You may need medicines to lower your cholesterol if any of the following is true:    You have a history of stroke, TIA, unstable angina, or a heart attack. Your LDL cholesterol level is 190 mg/dL or higher. You are age 36 to 76 years, have diabetes or heart disease risk factors, and your LDL cholesterol is 70 mg/dL or higher. Supplements  include fish oil, red yeast rice, and garlic. Fish oil may help lower your triglyceride and LDL cholesterol levels. It may also increase your HDL cholesterol level.  Red yeast rice may help decrease your total cholesterol level and LDL cholesterol level. Garlic may help lower your total cholesterol level. Do not take any supplements without talking to your healthcare provider. Food changes you can make to lower your cholesterol levels:  A dietitian can help you create a healthy eating plan. Your dietitian can show you how to read food labels and choose foods low in saturated fat, trans fats, and cholesterol. Decrease the total amount of fat you eat. Choose lean meats, fat-free or 1% fat milk, and low-fat dairy products, such as yogurt and cheese. Try to limit or avoid red meats. Limit or do not eat fried foods or baked goods, such as cookies. Replace unhealthy fats with healthy fats. Cook foods in olive oil or canola oil. Choose soft margarines that are low in saturated fat and trans fat. Seeds, nuts, and avocados are other examples of healthy fats. Eat foods with omega-3 fats. Examples include salmon, tuna, mackerel, walnuts, and flaxseed. Eat fish 2 times per week. Pregnant women should not eat fish that have high levels of mercury, such as shark, swordfish, and haily mackerel. Increase the amount of high-fiber foods you eat. High-fiber foods can help lower your LDL cholesterol. Aim to get between 20 and 30 grams of fiber each day. Fruits and vegetables are high in fiber. Eat at least 5 servings each day. Other high-fiber foods are whole-grain or whole-wheat breads, pastas, or cereals, and brown rice. Eat 3 ounces of whole-grain foods each day. Increase fiber slowly. You may have abdominal discomfort, bloating, and gas if you add fiber to your diet too quickly. Eat healthy protein foods. Examples include low-fat dairy products, skinless chicken and turkey, fish, and nuts. Limit foods and drinks that are high in sugar. Your dietitian or healthcare provider can help you create daily limits for high-sugar foods and drinks. The limit may be lower if you have diabetes or another health condition.  Limits can also help you lose weight if needed. Lifestyle changes you can make to lower your cholesterol levels:   Maintain a healthy weight. Ask your healthcare provider what a healthy weight is for you. Ask your provider to help you create a weight loss plan if needed. Weight loss can decrease your total cholesterol and triglyceride levels. Weight loss may also help keep your blood pressure at a healthy level. Be physically active throughout the day. Physical activity, such as exercise, can help lower your total cholesterol level and maintain a healthy weight. Physical activity can also help increase your HDL cholesterol level. Work with your healthcare provider to create an program that is right for you. Get at least 30 to 40 minutes of moderate physical activity most days of the week. Examples of exercise include brisk walking, swimming, or biking. Also include strength training at least 2 times each week. Your healthcare providers can help you create a physical activity plan. Do not smoke. Nicotine and other chemicals in cigarettes and cigars can raise your cholesterol levels. Ask your healthcare provider for information if you currently smoke and need help to quit. E-cigarettes or smokeless tobacco still contain nicotine. Talk to your healthcare provider before you use these products. Limit or do not drink alcohol. Alcohol can increase your triglyceride levels. Ask your healthcare provider before you drink alcohol. Ask how much is okay for you to drink in 24 hours or 1 week. Follow up with your doctor as directed:  Write down your questions so you remember to ask them during your visits. © Copyright [employer] 2023 Information is for End User's use only and may not be sold, redistributed or otherwise used for commercial purposes. The above information is an  only. It is not intended as medical advice for individual conditions or treatments.  Talk to your doctor, nurse or pharmacist before following any medical regimen to see if it is safe and effective for you.

## 2023-10-18 NOTE — PROGRESS NOTES
[address]    NAME: Ne Vasquez  AGE: 45 y.o. SEX: female  : 1985     DATE: 10/18/2023     Assessment and Plan:     Problem List Items Addressed This Visit        Other    Annual physical exam - Primary         Immunizations and preventive care screenings were discussed with patient today. Appropriate education was printed on patient's after visit summary. Counseling:  Dental Health: discussed importance of regular tooth brushing, flossing, and dental visits. Injury prevention: discussed safety/seat belts, safety helmets, smoke detectors, carbon dioxide detectors, and smoking near bedding or upholstery. Exercise: the importance of regular exercise/physical activity was discussed. Recommend exercise 3-5 times per week for at least 30 minutes. Return in about 6 months (around 2024) for Recheck. Chief Complaint:     Chief Complaint   Patient presents with   • Physical Exam   • Allergic Reaction     Patient would like testing - feels like she has seasonal allergies       History of Present Illness:     Adult Annual Physical   Patient here for a comprehensive physical exam. The patient reports no problems. Diet and Physical Activity  Diet/Nutrition: well balanced diet. Exercise: moderate cardiovascular exercise. Depression Screening  PHQ-2/9 Depression Screening         General Health  Sleep: sleeps well. Hearing: normal - bilateral.  Vision: goes for regular eye exams and wears glasses. Dental: regular dental visits. /GYN Health  Last menstrual period: 10/09/2023  Contraceptive method:  abstinence . History of STDs?: no.    Advanced Care Planning  Do you have an advanced directive? no  Do you have a durable medical power of ?  no     Review of Systems:     Review of Systems   Constitutional:  Negative for appetite change, fatigue, fever and unexpected weight change. HENT:  Negative for dental problem, ear pain, hearing loss, mouth sores, nosebleeds, rhinorrhea, tinnitus, trouble swallowing and voice change. Eyes:  Negative for photophobia, pain, discharge and visual disturbance. Respiratory:  Negative for cough, chest tightness, shortness of breath and wheezing. Cardiovascular:  Negative for chest pain and palpitations. Gastrointestinal:  Negative for abdominal pain, blood in stool, constipation, diarrhea, nausea, rectal pain and vomiting. Endocrine: Negative for cold intolerance, polydipsia, polyphagia and polyuria. Genitourinary:  Negative for decreased urine volume, difficulty urinating, dysuria, enuresis, frequency, genital sores, hematuria and urgency. Musculoskeletal:  Negative for arthralgias, back pain, gait problem, joint swelling, myalgias, neck pain and neck stiffness. Skin:  Negative for color change and rash. Allergic/Immunologic: Negative for environmental allergies, food allergies and immunocompromised state. Neurological:  Negative for dizziness, seizures, speech difficulty, light-headedness and headaches. Hematological:  Negative for adenopathy. Does not bruise/bleed easily. Psychiatric/Behavioral:  Negative for behavioral problems, confusion, decreased concentration, self-injury and sleep disturbance. The patient is not nervous/anxious and is not hyperactive.        Past Medical History:     Past Medical History:   Diagnosis Date   • Anxiety    • Anxiety    • Hypertension    • Obesity    • PCOS (polycystic ovarian syndrome)       Past Surgical History:     Past Surgical History:   Procedure Laterality Date   • NO PAST SURGERIES        Social History:     Social History     Socioeconomic History   • Marital status: Single     Spouse name: None   • Number of children: None   • Years of education: None   • Highest education level: None   Occupational History   • None   Tobacco Use   • Smoking status: Never   • Smokeless tobacco: Never   Vaping Use   • Vaping Use: Never used   Substance and Sexual Activity   • Alcohol use: Yes     Comment: Social   • Drug use: No   • Sexual activity: Never     Birth control/protection: None   Other Topics Concern   • None   Social History Narrative   • None     Social Determinants of Health     Financial Resource Strain: Not on file   Food Insecurity: Not on file   Transportation Needs: Not on file   Physical Activity: Not on file   Stress: Not on file   Social Connections: Not on file   Intimate Partner Violence: Not on file   Housing Stability: Not on file      Family History:     Family History   Problem Relation Age of Onset   • Breast cancer Mother    • Alcohol abuse Father    • Heart failure Father    • Heart disease Father    • Hypertension Father    • Breast cancer Maternal Grandmother    • Diabetes Paternal Grandmother    • Bipolar disorder Paternal Aunt    • Alcohol abuse Paternal Aunt    • Colon cancer Paternal Uncle    • Stroke Paternal Uncle    • Ovarian cancer Neg Hx    • Uterine cancer Neg Hx    • Cervical cancer Neg Hx    • Thyroid disease Neg Hx       Current Medications:     Current Outpatient Medications   Medication Sig Dispense Refill   • amLODIPine (NORVASC) 5 mg tablet      • cholecalciferol (VITAMIN D3) 1,000 units tablet Take 1,000 Units by mouth daily     • lisinopril-hydrochlorothiazide (PRINZIDE,ZESTORETIC) 20-12.5 MG per tablet Take 1 tablet by mouth daily  1   • Magnesium 400 MG CAPS Take 400 mg by mouth daily      • metFORMIN (GLUCOPHAGE-XR) 750 mg 24 hr tablet Take 1 tablet (750 mg total) by mouth daily with dinner 90 tablet 1   • metoprolol succinate (TOPROL-XL) 50 mg 24 hr tablet Take 50 mg by mouth daily      • sertraline (ZOLOFT) 100 mg tablet Take 1 tablet (100 mg total) by mouth daily 30 tablet 1   • spironolactone (ALDACTONE) 100 mg tablet Take 1 tablet (100 mg total) by mouth daily 90 tablet 1     No current facility-administered medications for this visit. Allergies: Allergies   Allergen Reactions   • Lactose - Food Allergy Diarrhea      Physical Exam:     /88 (BP Location: Left arm, Patient Position: Sitting, Cuff Size: Standard)   Pulse 81   Temp 98 °F (36.7 °C)   Ht 5' 8.75" (1.746 m)   Wt 134 kg (295 lb)   SpO2 95%   BMI 43.88 kg/m²     Physical Exam  Vitals and nursing note reviewed. Constitutional:       Appearance: Normal appearance. HENT:      Head: Normocephalic and atraumatic. Right Ear: Tympanic membrane, ear canal and external ear normal.      Left Ear: Tympanic membrane, ear canal and external ear normal.      Nose: Nose normal.      Mouth/Throat:      Mouth: Mucous membranes are moist.      Pharynx: Oropharynx is clear. Eyes:      Extraocular Movements: Extraocular movements intact. Conjunctiva/sclera: Conjunctivae normal.   Neck:      Thyroid: No thyromegaly. Vascular: No carotid bruit. Cardiovascular:      Rate and Rhythm: Normal rate and regular rhythm. Pulses: Normal pulses. Heart sounds: Normal heart sounds. Pulmonary:      Effort: Pulmonary effort is normal.      Breath sounds: Normal breath sounds. Abdominal:      General: Abdomen is flat. Bowel sounds are normal.      Palpations: Abdomen is soft. There is no hepatomegaly, splenomegaly or mass. Tenderness: There is no abdominal tenderness. There is no right CVA tenderness or left CVA tenderness. Musculoskeletal:         General: Normal range of motion. Cervical back: Normal range of motion and neck supple. Right lower leg: No edema. Left lower leg: No edema. Lymphadenopathy:      Cervical: No cervical adenopathy. Skin:     General: Skin is warm and dry. Neurological:      General: No focal deficit present. Mental Status: She is alert and oriented to person, place, and time. Psychiatric:         Mood and Affect: Mood normal.         Behavior: Behavior normal.         Thought Content:  Thought content normal.         Judgment: Judgment normal.        Results for orders placed or performed in visit on 09/14/23   Hemoglobin A1C   Result Value Ref Range    Hemoglobin A1C 5.6 Normal 4.0-5.6%; PreDiabetic 5.7-6.4%;  Diabetic >=6.5%; Glycemic control for adults with diabetes <7.0% %     mg/dl   Lipid panel   Result Value Ref Range    Cholesterol 168 See Comment mg/dL    Triglycerides 138 See Comment mg/dL    HDL, Direct 46 (L) >=50 mg/dL    LDL Calculated 94 0 - 100 mg/dL    Non-HDL-Chol (CHOL-HDL) 122 mg/dl      Vivianne Fothergill Cruse-Martocci, PA-C   [address]

## 2023-11-01 ENCOUNTER — HOSPITAL ENCOUNTER (EMERGENCY)
Facility: HOSPITAL | Age: 38
Discharge: HOME | End: 2023-11-01
Attending: EMERGENCY MEDICINE | Admitting: EMERGENCY MEDICINE
Payer: COMMERCIAL

## 2023-11-01 VITALS
WEIGHT: 291 LBS | TEMPERATURE: 97.2 F | BODY MASS INDEX: 44.1 KG/M2 | HEIGHT: 68 IN | OXYGEN SATURATION: 98 % | HEART RATE: 66 BPM | SYSTOLIC BLOOD PRESSURE: 123 MMHG | DIASTOLIC BLOOD PRESSURE: 85 MMHG | RESPIRATION RATE: 20 BRPM

## 2023-11-01 DIAGNOSIS — K08.89 PAIN, DENTAL: Primary | ICD-10-CM

## 2023-11-01 PROCEDURE — 99282 EMERGENCY DEPT VISIT SF MDM: CPT

## 2023-11-01 PROCEDURE — 99284 EMERGENCY DEPT VISIT MOD MDM: CPT | Performed by: EMERGENCY MEDICINE

## 2023-11-01 RX ORDER — PENICILLIN V POTASSIUM 500 MG/1
500 TABLET ORAL 4 TIMES DAILY
Qty: 28 TABLET | Refills: 0 | Status: SHIPPED | OUTPATIENT
Start: 2023-11-01 | End: 2023-11-08

## 2023-11-01 RX ORDER — OXYCODONE HYDROCHLORIDE AND ACETAMINOPHEN 5; 325 MG/1; MG/1
1 TABLET ORAL EVERY 6 HOURS PRN
Qty: 20 TABLET | Refills: 0 | Status: SHIPPED | OUTPATIENT
Start: 2023-11-01

## 2023-11-01 NOTE — ED PROVIDER NOTES
History  Chief Complaint   Patient presents with    Dental Pain     Patient reports root canal two weeks ago- was still having pain so had a second done today with no relief. Right lower. HPI patient is a 27-year-old female she reports 2 weeks ago she had a root canal on one of her molars on her right lower jaw. She reports today she had a root canal on the tooth next to that tooth because she still has pain in that area. She reports that her dentist told her to take Motrin for pain but she reports the pain is too severe and she cannot tolerate it. She was told that she did not need to do continue antibiotics. Patient reports pain and tenderness over the molar where she had a dental procedure. Denies any swelling around the area difficulty swallowing. She denies any swelling under the tongue. She reports that the pain seems to radiate up into her head now. Past medical history of hypertension polycystic ovarian syndrome  Family history noncontributory  Social history, non-smoker no history of drug abuse    Prior to Admission Medications   Prescriptions Last Dose Informant Patient Reported? Taking?    Magnesium 400 MG CAPS  Self Yes No   Sig: Take 400 mg by mouth daily    amLODIPine (NORVASC) 5 mg tablet   Yes No   cholecalciferol (VITAMIN D3) 1,000 units tablet   Yes No   Sig: Take 1,000 Units by mouth daily   lisinopril-hydrochlorothiazide (PRINZIDE,ZESTORETIC) 20-12.5 MG per tablet   Yes No   Sig: Take 1 tablet by mouth daily   metFORMIN (GLUCOPHAGE-XR) 750 mg 24 hr tablet   No No   Sig: Take 1 tablet (750 mg total) by mouth daily with dinner   metoprolol succinate (TOPROL-XL) 50 mg 24 hr tablet  Self Yes No   Sig: Take 50 mg by mouth daily    sertraline (ZOLOFT) 100 mg tablet   No No   Sig: Take 1 tablet (100 mg total) by mouth daily   spironolactone (ALDACTONE) 100 mg tablet   No No   Sig: Take 1 tablet (100 mg total) by mouth daily      Facility-Administered Medications: None       Past Medical History:   Diagnosis Date    Anxiety     Anxiety     Hypertension     Obesity     PCOS (polycystic ovarian syndrome)        Past Surgical History:   Procedure Laterality Date    NO PAST SURGERIES         Family History   Problem Relation Age of Onset    Breast cancer Mother     Alcohol abuse Father     Heart failure Father     Heart disease Father     Hypertension Father     Breast cancer Maternal Grandmother     Diabetes Paternal Grandmother     Bipolar disorder Paternal Aunt     Alcohol abuse Paternal Aunt     Colon cancer Paternal Uncle     Stroke Paternal Uncle     Ovarian cancer Neg Hx     Uterine cancer Neg Hx     Cervical cancer Neg Hx     Thyroid disease Neg Hx      I have reviewed and agree with the history as documented. E-Cigarette/Vaping    E-Cigarette Use Never User      E-Cigarette/Vaping Substances    Nicotine No     THC No     CBD No     Flavoring No     Other No     Unknown No      Social History     Tobacco Use    Smoking status: Never    Smokeless tobacco: Never   Vaping Use    Vaping Use: Never used   Substance Use Topics    Alcohol use: Yes     Comment: Social    Drug use: No       Review of Systems   HENT:  Positive for dental problem. Negative for drooling and facial swelling. Physical Exam  Physical Exam  Constitutional:       Appearance: Normal appearance. HENT:      Head: Normocephalic. Nose: Nose normal.      Mouth/Throat:      Comments: There is some tenderness over the patient's right mandibular posterior molar, no significant swelling, no swelling under the tongue no stridor no drooling  Neurological:      Mental Status: She is alert.          Vital Signs  ED Triage Vitals [11/01/23 1656]   Temperature Pulse Respirations Blood Pressure SpO2   (!) 97.2 °F (36.2 °C) 66 20 123/85 98 %      Temp src Heart Rate Source Patient Position - Orthostatic VS BP Location FiO2 (%)   -- Monitor Sitting Left arm --      Pain Score       --           Vitals:    11/01/23 1656   BP: 123/85 Pulse: 66   Patient Position - Orthostatic VS: Sitting         Visual Acuity      ED Medications  Medications - No data to display    Diagnostic Studies  Results Reviewed       None                   No orders to display              Procedures  Procedures         ED Course                               SBIRT 20yo+      Flowsheet Row Most Recent Value   Initial Alcohol Screen: US AUDIT-C     1. How often do you have a drink containing alcohol? 0 Filed at: 11/01/2023 1705   2. How many drinks containing alcohol do you have on a typical day you are drinking? 0 Filed at: 11/01/2023 1705   3a. Male UNDER 65: How often do you have five or more drinks on one occasion? 0 Filed at: 11/01/2023 1705   3b. FEMALE Any Age, or MALE 65+: How often do you have 4 or more drinks on one occassion? 0 Filed at: 11/01/2023 1705   Audit-C Score 0 Filed at: 11/01/2023 1705   [place]: How many times in the past year have you. .. Used an illegal drug or used a prescription medication for non-medical reasons? Never Filed at: 11/01/2023 1705                      Medical Decision Making  Medical decision making 31-year-old female presents emergency department with pain after a dental procedure, root canal, discussed patient advised antibiotics and analgesics she agreed. We discussed risk of opiates. Shared decision making. We discussed indications to return. Risk  Prescription drug management. Disposition  Final diagnoses:   Pain, dental     Time reflects when diagnosis was documented in both MDM as applicable and the Disposition within this note       Time User Action Codes Description Comment    11/1/2023  5:08 PM Alexx Patches Add [K08.89] Pain, dental           ED Disposition       ED Disposition   Discharge    Condition   Stable    Date/Time   Wed Nov 1, 2023 [address] discharge to home/self care.                    Follow-up Information    None         Discharge Medication List as of 11/1/2023 5:11 PM        START taking these medications    Details   oxyCODONE-acetaminophen (PERCOCET) 5-325 mg per tablet Take 1 tablet by mouth every 6 (six) hours as needed for moderate pain or severe pain for up to 20 doses Max Daily Amount: 4 tablets, Starting Wed 11/1/2023, Normal      penicillin V potassium (VEETID) 500 mg tablet Take 1 tablet (500 mg total) by mouth 4 (four) times a day for 7 days, Starting Wed 11/1/2023, Until Wed 11/8/2023, Normal           CONTINUE these medications which have NOT CHANGED    Details   amLODIPine (NORVASC) 5 mg tablet Starting Sun 12/19/2021, Historical Med      cholecalciferol (VITAMIN D3) 1,000 units tablet Take 1,000 Units by mouth daily, Historical Med      lisinopril-hydrochlorothiazide (PRINZIDE,ZESTORETIC) 20-12.5 MG per tablet Take 1 tablet by mouth daily, Starting Tue 10/15/2019, Historical Med      Magnesium 400 MG CAPS Take 400 mg by mouth daily , Historical Med      metFORMIN (GLUCOPHAGE-XR) 750 mg 24 hr tablet Take 1 tablet (750 mg total) by mouth daily with dinner, Starting Thu 6/8/2023, Normal      metoprolol succinate (TOPROL-XL) 50 mg 24 hr tablet Take 50 mg by mouth daily , Starting Tue 8/28/2018, Historical Med      sertraline (ZOLOFT) 100 mg tablet Take 1 tablet (100 mg total) by mouth daily, Starting Thu 9/14/2023, Until Mon 11/13/2023, Normal      spironolactone (ALDACTONE) 100 mg tablet Take 1 tablet (100 mg total) by mouth daily, Starting Thu 6/8/2023, Normal             No discharge procedures on file.     PDMP Review       None            ED Provider  Electronically Signed by             Sheria Boas, MD  11/01/23 8592

## 2023-11-01 NOTE — DISCHARGE INSTRUCTIONS
Penicillin every 6 hours of infection  Percocet 1 every 6 hours if needed for pain caution narcotic will make you sleepy  Follow-up with your dentist as soon as possible

## 2023-11-22 DIAGNOSIS — F32.89 OTHER DEPRESSION: ICD-10-CM

## 2023-11-22 DIAGNOSIS — F41.1 GAD (GENERALIZED ANXIETY DISORDER): ICD-10-CM

## 2023-11-22 RX ORDER — SERTRALINE HYDROCHLORIDE 100 MG/1
100 TABLET, FILM COATED ORAL DAILY
Qty: 30 TABLET | Refills: 0 | Status: SHIPPED | OUTPATIENT
Start: 2023-11-22 | End: 2024-01-21

## 2024-01-04 DIAGNOSIS — F41.1 GAD (GENERALIZED ANXIETY DISORDER): ICD-10-CM

## 2024-01-04 DIAGNOSIS — F32.89 OTHER DEPRESSION: ICD-10-CM

## 2024-01-04 RX ORDER — SERTRALINE HYDROCHLORIDE 100 MG/1
100 TABLET, FILM COATED ORAL DAILY
Qty: 30 TABLET | Refills: 0 | Status: SHIPPED | OUTPATIENT
Start: 2024-01-04 | End: 2024-03-04

## 2024-02-09 ENCOUNTER — DOCUMENTATION (OUTPATIENT)
Dept: BEHAVIORAL/MENTAL HEALTH CLINIC | Facility: CLINIC | Age: 39
End: 2024-02-09

## 2024-02-09 NOTE — PROGRESS NOTES
"Psychotherapy Discharge Summary    Preferred Name: Alix Garcia  YOB: 1985    Admission date to psychotherapy: 4/26/24    Referred by: Self    Presenting Problem: Anxiety    Course of treatment included : individual therapy     Progress/Outcome of Treatment Goals (brief summary of course of treatment) Alix attended 3 sessions and did not return to treatment.    Treatment Complications (if any): Lack of follow up.    Treatment Progress: poor    Current [facility] Psychiatric Provider: n/a    Discharge Medications include: This provider does not prescribe or monitor medications.    Discharge Date: 2/9/24    Discharge Diagnosis: No diagnosis found.    Criteria for Discharge: demonstrated failure to uphold their treatment plan/contract    Aftercare recommendations include (include specific referral names and phone numbers, if appropriate): -Return to treatment if needed  -Call 911 if psychiatric emergency/suicidal thoughts  -Utilize 241 or the Talk to Text line for support by texting \"Hello\" to [phone]    Prognosis: fair    "

## 2024-02-15 DIAGNOSIS — F32.89 OTHER DEPRESSION: ICD-10-CM

## 2024-02-15 DIAGNOSIS — F41.1 GAD (GENERALIZED ANXIETY DISORDER): ICD-10-CM

## 2024-02-15 RX ORDER — SERTRALINE HYDROCHLORIDE 100 MG/1
100 TABLET, FILM COATED ORAL DAILY
Qty: 30 TABLET | Refills: 0 | Status: SHIPPED | OUTPATIENT
Start: 2024-02-15 | End: 2024-04-15

## 2024-02-16 ENCOUNTER — TELEPHONE (OUTPATIENT)
Dept: PSYCHIATRY | Facility: CLINIC | Age: 39
End: 2024-02-16

## 2024-02-16 NOTE — TELEPHONE ENCOUNTER
DISCHARGE LETTER for [place] Collado LCSW certified and regular placed in outgoing mail on 2/16/2024    Article #:  [id number]    Address:  [facility]  [place] [address]

## 2024-02-29 ENCOUNTER — TELEPHONE (OUTPATIENT)
Dept: PSYCHIATRY | Facility: CLINIC | Age: 39
End: 2024-02-29

## 2024-02-29 NOTE — TELEPHONE ENCOUNTER
Certificate for the Discharge Letter was signed/received on 02/23/2024.   A copy has been scanned into Media.

## 2024-03-20 DIAGNOSIS — F32.89 OTHER DEPRESSION: ICD-10-CM

## 2024-03-20 DIAGNOSIS — F41.1 GAD (GENERALIZED ANXIETY DISORDER): ICD-10-CM

## 2024-03-20 RX ORDER — SERTRALINE HYDROCHLORIDE 100 MG/1
100 TABLET, FILM COATED ORAL DAILY
Qty: 30 TABLET | Refills: 0 | Status: SHIPPED | OUTPATIENT
Start: 2024-03-20 | End: 2024-05-19

## 2024-04-01 DIAGNOSIS — L68.0 HIRSUTISM: ICD-10-CM

## 2024-04-01 DIAGNOSIS — K76.0 FATTY LIVER: ICD-10-CM

## 2024-04-01 DIAGNOSIS — E28.2 PCOS (POLYCYSTIC OVARIAN SYNDROME): ICD-10-CM

## 2024-04-01 DIAGNOSIS — R73.03 PREDIABETES: ICD-10-CM

## 2024-04-01 DIAGNOSIS — E66.01 OBESITY, CLASS III, BMI 40-49.9 (MORBID OBESITY) (HCC): ICD-10-CM

## 2024-04-01 RX ORDER — SPIRONOLACTONE 100 MG/1
100 TABLET, FILM COATED ORAL DAILY
Qty: 90 TABLET | Refills: 1 | Status: SHIPPED | OUTPATIENT
Start: 2024-04-01

## 2024-04-01 NOTE — TELEPHONE ENCOUNTER
Reason for call:   [x] Refill   [] Prior Auth  [] Other:     Office:   [] PCP/Provider -   [x] Specialty/Provider - weight management    Medication:   Metformin 750mg- take 1 tablet by mouth daily with dinner  Spironolactone 100mg- take 1 tablet by mouth daily      Pharmacy: [facility]    Does the patient have enough for 3 days?   [] Yes   [x] No - Send as HP to POD

## 2024-04-02 RX ORDER — METFORMIN HYDROCHLORIDE 750 MG/1
750 TABLET, EXTENDED RELEASE ORAL
Qty: 30 TABLET | Refills: 0 | Status: SHIPPED | OUTPATIENT
Start: 2024-04-02

## 2024-04-19 ENCOUNTER — AMB VIDEO VISIT (OUTPATIENT)
Dept: OTHER | Facility: HOSPITAL | Age: 39
End: 2024-04-19
Payer: COMMERCIAL

## 2024-04-19 DIAGNOSIS — H00.021 HORDEOLUM INTERNUM OF RIGHT UPPER EYELID: ICD-10-CM

## 2024-04-19 DIAGNOSIS — H00.031 CELLULITIS OF RIGHT UPPER EYELID: Primary | ICD-10-CM

## 2024-04-19 PROBLEM — R73.9 HYPERGLYCEMIA: Status: RESOLVED | Noted: 2018-03-06 | Resolved: 2024-04-19

## 2024-04-19 PROBLEM — Z00.00 ANNUAL PHYSICAL EXAM: Status: RESOLVED | Noted: 2018-03-06 | Resolved: 2024-04-19

## 2024-04-19 PROBLEM — K60.2 ANAL FISSURE: Status: RESOLVED | Noted: 2023-02-28 | Resolved: 2024-04-19

## 2024-04-19 PROBLEM — L68.0 HIRSUTISM: Status: RESOLVED | Noted: 2023-04-03 | Resolved: 2024-04-19

## 2024-04-19 PROBLEM — R73.03 PREDIABETES: Status: RESOLVED | Noted: 2022-04-12 | Resolved: 2024-04-19

## 2024-04-19 PROBLEM — N92.6 IRREGULAR MENSES: Status: RESOLVED | Noted: 2019-10-25 | Resolved: 2024-04-19

## 2024-04-19 PROCEDURE — 99212 OFFICE O/P EST SF 10 MIN: CPT | Performed by: PHYSICIAN ASSISTANT

## 2024-04-19 RX ORDER — ERYTHROMYCIN 5 MG/G
0.5 OINTMENT OPHTHALMIC EVERY 8 HOURS SCHEDULED
Qty: 3.5 G | Refills: 0 | Status: SHIPPED | OUTPATIENT
Start: 2024-04-19

## 2024-04-19 RX ORDER — CEPHALEXIN 500 MG/1
500 CAPSULE ORAL EVERY 12 HOURS SCHEDULED
Qty: 14 CAPSULE | Refills: 0 | Status: SHIPPED | OUTPATIENT
Start: 2024-04-19 | End: 2024-04-26

## 2024-04-19 NOTE — PATIENT INSTRUCTIONS
"Schedule a follow-up appointment with your primary care physician for recheck in 2-3 days. If you cannot see your PCP, you can schedule a follow up appointment at a [facility]. Go to the emergency department if you develop any new or worsening symptoms including painful eye movement, fevers, or anything else that is concerning.    Excuses can be found in \"Letters\" section of [employer] raffaele. Can print if opened from a web browser  Care Anywhere phone number is [phone] if you need assistance or have further questions    [phone] ([phone]  Schedule or Reschedule Outpatient Testing - Option 2  Billing - Option 3  General Info - Option 4  Predecthart Help - Option 5  Comprehensive Spine Program - Option 6   COVID - Option 7    Stye   WHAT YOU NEED TO KNOW:   A stye is a lump on the edge or inside of your upper or lower eyelid caused by an infection. A stye usually starts to get better within 1 week and is often gone within 2 weeks.       DISCHARGE INSTRUCTIONS:   Call your doctor if:   You have redness and discharge around your eye, and your eye pain is getting worse.    Your vision changes.    The stye has not gone away within 2 weeks.    The stye comes back within a short period of time after treatment.    You have questions or concerns about your condition or care.    Medicines:   Antibiotic medicine  is given as an ointment to put into your eye. It is used to fight an infection caused by bacteria. Use as directed.    Take your medicine as directed.  Contact your healthcare provider if you think your medicine is not helping or if you have side effects. Tell your provider if you are allergic to any medicine. Keep a list of the medicines, vitamins, and herbs you take. Include the amounts, and when and why you take them. Bring the list or the pill bottles to follow-up visits. Carry your medicine list with you in case of an emergency.    Manage a stye:   Use warm compresses, as directed.  This will help " decrease swelling and pain. Wet a clean washcloth with warm water and place it on your eye for 10 to 15 minutes, 3 to 4 times each day, or as directed.    Keep your hands away from your eye.  This helps to prevent the spread of infection to other parts of the eye. Wash your hands often with soap and dry with a clean towel. Do not squeeze the stye.    Do not wear eye makeup while you have a stye.  Eye makeup may carry bacteria and cause another stye. Throw away eye makeup and brushes used to apply the makeup. Use new eye makeup after the stye has gone away. Do not share eye makeup with others.    Prevent another stye:  Wash your face and clean your eyelashes every day. Remove eye makeup with makeup remover. This helps to remove eye makeup without heavy rubbing.  Follow up with your doctor as directed:  Write down your questions so you remember to ask them during your visits.  © Copyright [employer] 2023 Information is for End User's use only and may not be sold, redistributed or otherwise used for commercial purposes.  The above information is an  only. It is not intended as medical advice for individual conditions or treatments. Talk to your doctor, nurse or pharmacist before following any medical regimen to see if it is safe and effective for you.

## 2024-04-19 NOTE — CARE ANYWHERE EVISITS
Visit Summary for PARISA GARCIA - Gender: Female - Date of Birth: 1985  Date: [id number] - Duration: 13 minutes  Patient: PARISA MANCERABrooke ZUHAIR  Provider: Shannon Severino PA-C    Patient Contact Information  Address  [address] [facility] [address]  [phone]    Visit Topics  Stye [Added By: Self - 2024-04-19]    Triage Questions   What is your current physical address in the event of a medical emergency? Answer []  Are you allergic to any medications? Answer []  Are you now or could you be pregnant? Answer []  Do you have any immune system compromise or chronic lung   disease? Answer []  Do you have any vulnerable family members in the home (infant, pregnant, cancer, elderly)? Answer []     Conversation Transcripts  [0A][0A] [Notification] You are connected with Shannon Severino PA-C, Urgent Care Specialist.[0A][Notification] Parisa Garcia is located in [place].[0A][Notification] Parisa Garcia has shared health history...[0A]    Diagnosis  Abscess of right upper eyelid  Hordeolum internum right upper eyelid    Procedures  Value: [id number] Code: CPT-4 UNLISTED E&M SERVICE    Medications Prescribed    No prescriptions ordered    Electronically signed by: Severino PA-C, Shannon(NPI [id number])

## 2024-04-19 NOTE — PROGRESS NOTES
Required Documentation:  Encounter provider Shannon D Severino, PA-C    Provider located at [facility]  VIRTUAL CARE   [address] [address]    Identify all parties in room with patient during virtual visit:  No one else    The patient was identified by name and date of birth. Alix Garcia was informed that this is a telemedicine visit and that the visit is being conducted through the [employer] platform. She agrees to proceed..  My office door was closed. No one else was in the room.  She acknowledged consent and understanding of privacy and security of the video platform. The patient has agreed to participate and understands they can discontinue the visit at any time.    Verification of patient location:    Patient is located at Home in the following state in which I hold an active license [place]    Patient is aware this is a billable service.     Reason for visit is No chief complaint on file.       Subjective  HPI   Pt reports a stye to her R eye starting Tuesday into Wednesday. Tried warm compresses once daily. Getting more painful. Slightly tender to periorbital region superiorly as well. No fevers, no changes in vision, no painful eye movement.    Past Medical History:   Diagnosis Date    Anxiety     Anxiety     Hypertension     Obesity     PCOS (polycystic ovarian syndrome)        Past Surgical History:   Procedure Laterality Date    NO PAST SURGERIES          Allergies   Allergen Reactions    Lactose - Food Allergy Diarrhea       Review of Systems   Constitutional:  Negative for fever.   HENT:  Negative for nosebleeds.    Eyes:  Negative for redness.   Respiratory:  Negative for shortness of breath.    Cardiovascular:  Negative for chest pain.   Gastrointestinal:  Negative for blood in stool.   Genitourinary:  Negative for hematuria.   Musculoskeletal:  Negative for gait problem.   Skin:  Negative for rash.   Neurological:  Negative for  "seizures.   Psychiatric/Behavioral:  Negative for behavioral problems.        Video Exam    There were no vitals filed for this visit.    Physical Exam  Constitutional:       General: She is not in acute distress.     Appearance: Normal appearance. She is not toxic-appearing.   HENT:      Head: Normocephalic and atraumatic.      Nose: No rhinorrhea.      Mouth/Throat:      Mouth: Mucous membranes are moist.   Eyes:      General:         Right eye: Hordeolum present. No discharge.      Conjunctiva/sclera: Conjunctivae normal.        Comments: glasses   Pulmonary:      Effort: Pulmonary effort is normal. No respiratory distress.      Breath sounds: No wheezing (no gross audible wheeze through computer).   Musculoskeletal:      Cervical back: Normal range of motion.   Skin:     Findings: No rash (on face or neck).   Neurological:      Mental Status: She is alert.      Cranial Nerves: No dysarthria or facial asymmetry.   Psychiatric:         Mood and Affect: Mood normal.         Behavior: Behavior normal.         Visit Time  Total Visit Duration: 13 minutes    Assessment/Plan:    Diagnoses and all orders for this visit:    Cellulitis of right upper eyelid  -     cephalexin (KEFLEX) 500 mg capsule; Take 1 capsule (500 mg total) by mouth every 12 (twelve) hours for 7 days    Hordeolum internum of right upper eyelid  -     erythromycin (ILOTYCIN) ophthalmic ointment; Administer 0.5 inches to the right eye every 8 (eight) hours        Patient Instructions   Schedule a follow-up appointment with your primary care physician for recheck in 2-3 days. If you cannot see your PCP, you can schedule a follow up appointment at a [facility]. Go to the emergency department if you develop any new or worsening symptoms including painful eye movement, fevers, or anything else that is concerning.    Excuses can be found in \"Letters\" section of [employer] raffaele. Can print if opened from a web browser  [facility] phone number is " [phone] if you need assistance or have further questions    [phone] ([phone]  Schedule or Reschedule Outpatient Testing - Option 2  Billing - Option 3  General Info - Option 4  MyChart Help - Option 5  Comprehensive Spine Program - Option 6   COVID - Option 7    Stye   WHAT YOU NEED TO KNOW:   A stye is a lump on the edge or inside of your upper or lower eyelid caused by an infection. A stye usually starts to get better within 1 week and is often gone within 2 weeks.       DISCHARGE INSTRUCTIONS:   Call your doctor if:   You have redness and discharge around your eye, and your eye pain is getting worse.    Your vision changes.    The stye has not gone away within 2 weeks.    The stye comes back within a short period of time after treatment.    You have questions or concerns about your condition or care.    Medicines:   Antibiotic medicine  is given as an ointment to put into your eye. It is used to fight an infection caused by bacteria. Use as directed.    Take your medicine as directed.  Contact your healthcare provider if you think your medicine is not helping or if you have side effects. Tell your provider if you are allergic to any medicine. Keep a list of the medicines, vitamins, and herbs you take. Include the amounts, and when and why you take them. Bring the list or the pill bottles to follow-up visits. Carry your medicine list with you in case of an emergency.    Manage a stye:   Use warm compresses, as directed.  This will help decrease swelling and pain. Wet a clean washcloth with warm water and place it on your eye for 10 to 15 minutes, 3 to 4 times each day, or as directed.    Keep your hands away from your eye.  This helps to prevent the spread of infection to other parts of the eye. Wash your hands often with soap and dry with a clean towel. Do not squeeze the stye.    Do not wear eye makeup while you have a stye.  Eye makeup may carry bacteria and cause another stye. Throw away eye  makeup and brushes used to apply the makeup. Use new eye makeup after the stye has gone away. Do not share eye makeup with others.    Prevent another stye:  Wash your face and clean your eyelashes every day. Remove eye makeup with makeup remover. This helps to remove eye makeup without heavy rubbing.  Follow up with your doctor as directed:  Write down your questions so you remember to ask them during your visits.  © Copyright [employer] 2023 Information is for End User's use only and may not be sold, redistributed or otherwise used for commercial purposes.  The above information is an  only. It is not intended as medical advice for individual conditions or treatments. Talk to your doctor, nurse or pharmacist before following any medical regimen to see if it is safe and effective for you.

## 2024-05-01 ENCOUNTER — TELEPHONE (OUTPATIENT)
Age: 39
End: 2024-05-01

## 2024-05-01 ENCOUNTER — OFFICE VISIT (OUTPATIENT)
Dept: BARIATRICS | Facility: CLINIC | Age: 39
End: 2024-05-01
Payer: COMMERCIAL

## 2024-05-01 VITALS
HEIGHT: 69 IN | SYSTOLIC BLOOD PRESSURE: 122 MMHG | OXYGEN SATURATION: 97 % | RESPIRATION RATE: 18 BRPM | WEIGHT: 293 LBS | BODY MASS INDEX: 43.4 KG/M2 | HEART RATE: 82 BPM | DIASTOLIC BLOOD PRESSURE: 76 MMHG

## 2024-05-01 DIAGNOSIS — R63.8 ABNORMAL CRAVING: ICD-10-CM

## 2024-05-01 DIAGNOSIS — E28.2 PCOS (POLYCYSTIC OVARIAN SYNDROME): ICD-10-CM

## 2024-05-01 DIAGNOSIS — E66.01 OBESITY, CLASS III, BMI 40-49.9 (MORBID OBESITY) (HCC): Primary | ICD-10-CM

## 2024-05-01 DIAGNOSIS — R73.03 PREDIABETES: ICD-10-CM

## 2024-05-01 DIAGNOSIS — F41.9 ANXIETY: ICD-10-CM

## 2024-05-01 DIAGNOSIS — F32.89 OTHER DEPRESSION: ICD-10-CM

## 2024-05-01 DIAGNOSIS — K76.0 FATTY LIVER: ICD-10-CM

## 2024-05-01 DIAGNOSIS — F41.1 GAD (GENERALIZED ANXIETY DISORDER): ICD-10-CM

## 2024-05-01 PROCEDURE — 99214 OFFICE O/P EST MOD 30 MIN: CPT | Performed by: FAMILY MEDICINE

## 2024-05-01 RX ORDER — BUPROPION HYDROCHLORIDE 150 MG/1
TABLET, EXTENDED RELEASE ORAL
Qty: 60 TABLET | Refills: 2 | Status: SHIPPED | OUTPATIENT
Start: 2024-05-01

## 2024-05-01 RX ORDER — NALTREXONE HYDROCHLORIDE 50 MG/1
TABLET, FILM COATED ORAL
Qty: 30 TABLET | Refills: 3 | Status: SHIPPED | OUTPATIENT
Start: 2024-05-01

## 2024-05-01 RX ORDER — SERTRALINE HYDROCHLORIDE 100 MG/1
100 TABLET, FILM COATED ORAL DAILY
Qty: 90 TABLET | Refills: 1 | Status: SHIPPED | OUTPATIENT
Start: 2024-05-01 | End: 2024-10-28

## 2024-05-01 RX ORDER — METFORMIN HYDROCHLORIDE 750 MG/1
750 TABLET, EXTENDED RELEASE ORAL
Qty: 90 TABLET | Refills: 0 | Status: SHIPPED | OUTPATIENT
Start: 2024-05-01

## 2024-05-01 NOTE — TELEPHONE ENCOUNTER
[facility] called in, they do not have pt's medication Naltrexone. Pharmacy also called around to check other locations and nothing is available elsewhere.

## 2024-05-01 NOTE — PATIENT INSTRUCTIONS
Wellbutrin: Most common side effects discussed with patient : dizziness, constipation, insomnia, vivid dreams, increased blood pressure, heart rate, depression/anxiety, headache, fatigue, glaucoma. Patient should call/return if he/she develops symptoms of depression/anxiety and stop the medication. Patient advised to call ER for an evaluation if thoughts of harming self/others occur. Suicidal & Crisis Lifeline at  988   Patient denies Hx of seizures, MI, glaucoma , arrhythmia, homicidal /suicidal ideation .    Naltrexone - Side effects of naltrexone discussed: nausea, vomiting, diarrhea, constipation, headache, anxiety, and confusion. Advised not to consume alcohol with naltrexone. Must be discontinued prior to surgery. Advised Naltrexone blocks the pain medicine receptors and if patient ever needs opioids then the medicine needs to be stopped.

## 2024-05-01 NOTE — PROGRESS NOTES
Assessment/Plan:  Alix was seen today for follow-up.  1. Obesity, Class III, BMI 40-49.9 (morbid obesity) (HCC)  TSH w/Reflex    naltrexone (REVIA) 50 mg tablet    metFORMIN (GLUCOPHAGE-XR) 750 mg 24 hr tablet      2. Prediabetes  Hemoglobin A1C    Comprehensive metabolic panel    metFORMIN (GLUCOPHAGE-XR) 750 mg 24 hr tablet      3. Fatty liver  TSH w/Reflex    Comprehensive metabolic panel    metFORMIN (GLUCOPHAGE-XR) 750 mg 24 hr tablet      4. Anxiety  buPROPion (Wellbutrin SR) 150 mg 12 hr tablet      5. Abnormal craving  buPROPion (Wellbutrin SR) 150 mg 12 hr tablet    naltrexone (REVIA) 50 mg tablet      6. PCOS (polycystic ovarian syndrome)  metFORMIN (GLUCOPHAGE-XR) 750 mg 24 hr tablet          1. Obesity, Class III, BMI 40-49.9 (morbid obesity) (HCC)   continue meal planning as discussed start Healthy Core  Start Wellbutrin/Naltrexone combo   Could not tolerate Wegovy at 0.5mg had violent nausea , able to tolerate only 0.25mg   Lost 25lbs with it but off now  - metFORMIN (GLUCOPHAGE-XR) 750 mg 24 hr tablet; Take 1 tablet (750 mg total) by mouth daily with dinner  Dispense: 90 tablet; Refill: 1  - spironolactone (ALDACTONE) 100 mg tablet; Take 1 tablet (100 mg total) by mouth daily  Dispense: 90 tablet; Refill: 1  - Basic metabolic panel; Future    2. Prediabetes     - metFORMIN (GLUCOPHAGE-XR) 750 mg 24 hr tablet; Take 1 tablet (750 mg total) by mouth daily with dinner  Dispense: 90 tablet; Refill: 1    3. PCOS (polycystic ovarian syndrome)   increase Spironolactone dose to 100mg , check renal fc in 1 mo  - metFORMIN (GLUCOPHAGE-XR) 750 mg 24 hr tablet; Take 1 tablet (750 mg total) by mouth daily with dinner  Dispense: 90 tablet; Refill: 1  - spironolactone (ALDACTONE) 100 mg tablet; Take 1 tablet (100 mg total) by mouth daily  Dispense: 90 tablet; Refill: 1  - Basic metabolic panel; Future    4. Fatty liver     - metFORMIN (GLUCOPHAGE-XR) 750 mg 24 hr tablet; Take 1 tablet (750 mg total) by mouth daily  with dinner  Dispense: 90 tablet; Refill: 1    5. Hirsutism   Improved with this will continue  - spironolactone (ALDACTONE) 100 mg tablet; Take 1 tablet (100 mg total) by mouth daily  Dispense: 90 tablet; Refill: 1CMP normal K corrected continue Spironolactone and Metformin , Increase Spironolactone dose stop K suplementation  Encourage mindful eating, portion control, motivational interview performed to help patient reach goals   Will plan to do Healthy Ways program  Component Ref Range & Units 1/25/23  9:28 AM 10/22/20  8:45 AM 11/9/18  9:50 AM 3/8/18  9:48 AM   Hemoglobin A1C Normal 3.8-5.6%; PreDiabetic 5.7-6.4%; Diabetic >=6.5%; Glycemic control for adults with diabetes <7.0% % 6.1 High   6.3 High   5.6 R, CM  5.2        Follow up in approximately 3 mo   Subjective:   Chief Complaint   Patient presents with    Follow-up     Patient here to discuss weight associated problems and nutrition goals  HPI: Alix Garcia  is a 38 y.o. female with excess weight/obesity here to pursue weight management. Most recent notes and records were reviewed.  Initial weight loss goal of 5-10% weight loss for improved health  Wt Readings from Last 10 Encounters:   05/01/24 134 kg (295 lb 12.8 oz)   11/01/23 132 kg (291 lb)   10/18/23 134 kg (295 lb)   09/14/23 133 kg (294 lb)   06/08/23 133 kg (293 lb 4.8 oz)   04/03/23 (!) 139 kg (307 lb 3.2 oz)   02/28/23 (!) 138 kg (305 lb)   02/13/23 (!) 141 kg (310 lb 11.2 oz)   01/25/23 (!) 141 kg (310 lb)   10/05/22 (!) 140 kg (309 lb)   Alix finished the [facility] Core Program back in 2023   Reevue Indirect Calorimeter REE:  2506          Weight loss without exercise: 2506 - 3256          Weight loss with exercise:   + 251                   Maintenance:   2006 - 2506      Initial weight:6/9/22 311lbs  OV weight 310lbs  telemedicine visit, Wegovy used for low dose only , could not tolerate 0.5 mg     Last OV weight:6/2023 :307lbs  Current 295lbs    Increased appetite/cravings: none after  starting Metformin   Having 3 meals per day  Protein shake at lunch  Hydration 70oz water and diet ice tea      The following portions of the patient's history were reviewed and updated as appropriate: allergies, current medications, past family history, past medical history, past social history, past surgical history, and problem list.      Review of Systems   Constitutional: Negative for activity chang_++ Fatigue  HENT: Negative for trouble swallowing.    Respiratory: Negative for shortness of breath.    Cardiovascular: Negative for chest pain, edema  Gastrointestinal: Negative for abdominal pain, nausea and vomiting, acid reflux, constipation/diarrhea  Psychiatric/Behavioral: Negative for behavioral problems , anxiety or depression  Does not sleep well  Objective:  There were no vitals taken for this visit.  Constitutional: Well-developed, well-nourished and Obese There is no height or weight on file to calculate BMI..  HEENT: No conjunctival pallor or jaundice.  Pulmonary: No increased work of breathing or signs of respiratory distress.  CV: well perfused  GI: Obese. Non-distended   MSK: No edema   Neuro: Oriented to person, place and time. Normal Speech. Normal gait.  Psych: Normal affect and mood. Normal thought process no delusions  Labs and Imaging  Recent labs and imaging have been personally reviewed.  Lab Results   Component Value Date    WBC 7.80 03/31/2022    HGB 15.0 03/31/2022    HCT 42.1 03/31/2022    MCV 89 03/31/2022     03/31/2022     Lab Results   Component Value Date    SODIUM 136 09/14/2023    K 4.8 09/14/2023     09/14/2023    CO2 26 09/14/2023    AGAP 5 09/14/2023    BUN 14 09/14/2023    CREATININE 1.03 09/14/2023    GLUC 80 11/26/2018    GLUF 85 09/14/2023    CALCIUM 9.7 09/14/2023    AST 26 06/07/2023    ALT 33 06/07/2023    ALKPHOS 42 06/07/2023    TP 7.9 06/07/2023    TBILI 0.61 06/07/2023    EGFR 69 09/14/2023     Lab Results   Component Value Date    HGBA1C 5.6 09/14/2023      Lab Results   Component Value Date    RZG4BQUGYEUV 2.107 01/25/2023    TSH 1.72 11/09/2018     Lab Results   Component Value Date    CHOLESTEROL 168 09/14/2023     Lab Results   Component Value Date    HDL 46 (L) 09/14/2023     Lab Results   Component Value Date    TRIG 138 09/14/2023     Lab Results   Component Value Date    LDLCALC 94 09/14/2023

## 2024-05-01 NOTE — TELEPHONE ENCOUNTER
Reason for call:   [x] Refill   [] Prior Auth  [] Other:     Office:   [] PCP/Provider -   [] Specialty/Provider -     Medication: sertraline (ZOLOFT) 100 mg tablet     Dose/Frequency:  Take 1 tablet (100 mg total) by mouth daily     Quantity: 30    Pharmacy: [facility] [address] [phone]     Does the patient have enough for 3 days?   [] Yes   [x] No - Send as HP to POD

## 2024-05-10 ENCOUNTER — OFFICE VISIT (OUTPATIENT)
Dept: FAMILY MEDICINE CLINIC | Facility: CLINIC | Age: 39
End: 2024-05-10
Payer: COMMERCIAL

## 2024-05-10 VITALS
SYSTOLIC BLOOD PRESSURE: 122 MMHG | HEART RATE: 52 BPM | OXYGEN SATURATION: 98 % | HEIGHT: 69 IN | WEIGHT: 293 LBS | DIASTOLIC BLOOD PRESSURE: 74 MMHG | BODY MASS INDEX: 43.4 KG/M2 | TEMPERATURE: 97.4 F

## 2024-05-10 DIAGNOSIS — F33.0 MILD EPISODE OF RECURRENT MAJOR DEPRESSIVE DISORDER (HCC): ICD-10-CM

## 2024-05-10 DIAGNOSIS — I10 ESSENTIAL HYPERTENSION: Primary | ICD-10-CM

## 2024-05-10 DIAGNOSIS — E28.2 PCOS (POLYCYSTIC OVARIAN SYNDROME): ICD-10-CM

## 2024-05-10 DIAGNOSIS — E78.2 MIXED HYPERLIPIDEMIA: ICD-10-CM

## 2024-05-10 DIAGNOSIS — F41.1 GENERALIZED ANXIETY DISORDER: ICD-10-CM

## 2024-05-10 DIAGNOSIS — Z80.3 FAMILY HISTORY OF BREAST CANCER IN MOTHER: ICD-10-CM

## 2024-05-10 PROCEDURE — 99214 OFFICE O/P EST MOD 30 MIN: CPT | Performed by: PHYSICIAN ASSISTANT

## 2024-05-10 NOTE — PROGRESS NOTES
Assessment/Plan:          Diagnoses and all orders for this visit:    Essential hypertension    Mixed hyperlipidemia    Mild episode of recurrent major depressive disorder (HCC)    Generalized anxiety disorder    PCOS (polycystic ovarian syndrome)    Family history of breast cancer in mother  -     Mammo screening bilateral w 3d & cad; Future            Subjective:      Patient ID: Alix Garcia is a 38 y.o. female.    Pt is seeing weight management now. She has been under some stress recently due to her mother having a recurrence of her breast cancer. She states her mother is doing well and things are improving.    Hypertension  This is a chronic problem. The current episode started more than 1 year ago. The problem is controlled. Pertinent negatives include no blurred vision, chest pain, headaches, malaise/fatigue, neck pain, palpitations, peripheral edema, shortness of breath or sweats. There are no associated agents to hypertension. Risk factors for coronary artery disease include dyslipidemia, obesity and family history. Past treatments include ACE inhibitors, beta blockers, calcium channel blockers and diuretics. The current treatment provides significant improvement. There are no compliance problems.  There is no history of angina, kidney disease, CAD/MI, CVA, heart failure, left ventricular hypertrophy, PVD or retinopathy.   Hyperlipidemia  This is a chronic problem. The problem is controlled. Factors aggravating her hyperlipidemia include beta blockers. Pertinent negatives include no chest pain, focal sensory loss, focal weakness, leg pain, myalgias or shortness of breath. Current antihyperlipidemic treatment includes diet change and exercise. The current treatment provides significant improvement of lipids. There are no compliance problems.        The following portions of the patient's history were reviewed and updated as appropriate:   She has a past medical history of Anxiety, Anxiety, Hypertension,  Obesity, and PCOS (polycystic ovarian syndrome).,  does not have any pertinent problems on file.,   has a past surgical history that includes No past surgeries.,  family history includes Alcohol abuse in her father and paternal aunt; Bipolar disorder in her paternal aunt; Breast cancer in her maternal grandmother and mother; Colon cancer in her paternal uncle; Diabetes in her paternal grandmother; Heart disease in her father; Heart failure in her father; Hypertension in her father; Stroke in her paternal uncle.,   reports that she has never smoked. She has never used smokeless tobacco. She reports current alcohol use. She reports that she does not use drugs.,  is allergic to lactose - food allergy..  Current Outpatient Medications   Medication Sig Dispense Refill   • amLODIPine (NORVASC) 5 mg tablet      • buPROPion (Wellbutrin SR) 150 mg 12 hr tablet Take by mouth one tab daily for 7 days then increase to one tab twice daily 60 tablet 2   • cholecalciferol (VITAMIN D3) 1,000 units tablet Take 1,000 Units by mouth daily     • lisinopril-hydrochlorothiazide (PRINZIDE,ZESTORETIC) 20-12.5 MG per tablet Take 1 tablet by mouth daily  1   • Magnesium 400 MG CAPS Take 400 mg by mouth daily      • metFORMIN (GLUCOPHAGE-XR) 750 mg 24 hr tablet Take 1 tablet (750 mg total) by mouth daily with dinner 90 tablet 0   • metoprolol succinate (TOPROL-XL) 50 mg 24 hr tablet Take 50 mg by mouth daily      • naltrexone (REVIA) 50 mg tablet Take by mouth half tab daily for 7 days and increase to half tab twice daily . Take together with Bupropion. 30 tablet 3   • sertraline (ZOLOFT) 100 mg tablet Take 1 tablet (100 mg total) by mouth daily 90 tablet 1   • spironolactone (ALDACTONE) 100 mg tablet Take 1 tablet (100 mg total) by mouth daily 90 tablet 1     No current facility-administered medications for this visit.       Review of Systems   Constitutional:  Negative for appetite change, fatigue and malaise/fatigue.   HENT:  Negative for  "trouble swallowing.    Eyes:  Negative for blurred vision and pain.   Respiratory:  Negative for chest tightness and shortness of breath.    Cardiovascular:  Negative for chest pain and palpitations.   Gastrointestinal:  Negative for abdominal pain, constipation, diarrhea and nausea.   Endocrine: Negative for polydipsia, polyphagia and polyuria.   Genitourinary:  Negative for dysuria and flank pain.   Musculoskeletal:  Negative for back pain, myalgias and neck pain.   Skin:  Negative for rash.   Neurological:  Negative for dizziness, focal weakness, light-headedness, numbness and headaches.   Psychiatric/Behavioral:  Negative for decreased concentration, dysphoric mood, self-injury, sleep disturbance and suicidal ideas. The patient is not nervous/anxious.          Objective:  Vitals:    05/10/24 0848   BP: 122/74   BP Location: Left arm   Patient Position: Sitting   Cuff Size: Large   Pulse: (!) 52   Temp: (!) 97.4 °F (36.3 °C)   TempSrc: Tympanic   SpO2: 98%   Weight: 134 kg (296 lb)   Height: 5' 8.9\" (1.75 m)     Body mass index is 43.84 kg/m².     Physical Exam  Vitals and nursing note reviewed.   Constitutional:       Appearance: Normal appearance. She is well-developed. She is obese.   HENT:      Head: Normocephalic.      Right Ear: Tympanic membrane, ear canal and external ear normal.      Left Ear: Tympanic membrane, ear canal and external ear normal.      Nose: Nose normal.      Mouth/Throat:      Mouth: Mucous membranes are moist.      Pharynx: Oropharynx is clear.   Eyes:      Extraocular Movements: Extraocular movements intact.      Conjunctiva/sclera: Conjunctivae normal.      Pupils: Pupils are equal, round, and reactive to light.   Neck:      Thyroid: No thyroid mass, thyromegaly or thyroid tenderness.      Vascular: No carotid bruit.      Trachea: Trachea normal.   Cardiovascular:      Rate and Rhythm: Normal rate and regular rhythm.      Pulses: Normal pulses.      Heart sounds: Normal heart sounds. "   Pulmonary:      Effort: Pulmonary effort is normal.      Breath sounds: Normal breath sounds.   Abdominal:      General: Bowel sounds are normal.      Palpations: Abdomen is soft. There is no mass.      Tenderness: There is no abdominal tenderness. There is no right CVA tenderness or left CVA tenderness.   Musculoskeletal:         General: No tenderness. Normal range of motion.      Cervical back: Normal range of motion. No pain with movement or muscular tenderness. Normal range of motion.      Right lower leg: No edema.      Left lower leg: No edema.   Lymphadenopathy:      Cervical: No cervical adenopathy.      Right cervical: No superficial, deep or posterior cervical adenopathy.     Left cervical: No superficial, deep or posterior cervical adenopathy.   Skin:     General: Skin is dry.      Findings: No rash.   Neurological:      General: No focal deficit present.      Mental Status: She is alert and oriented to person, place, and time.      Deep Tendon Reflexes: Reflexes are normal and symmetric.   Psychiatric:         Mood and Affect: Mood normal.         Behavior: Behavior normal.         Thought Content: Thought content normal.         Judgment: Judgment normal.

## 2024-05-30 ENCOUNTER — CLINICAL SUPPORT (OUTPATIENT)
Dept: BARIATRICS | Facility: CLINIC | Age: 39
End: 2024-05-30

## 2024-05-30 VITALS — WEIGHT: 293 LBS | BODY MASS INDEX: 43.4 KG/M2 | HEIGHT: 69 IN

## 2024-05-30 DIAGNOSIS — R63.5 ABNORMAL WEIGHT GAIN: Primary | ICD-10-CM

## 2024-05-30 PROCEDURE — RECHECK

## 2024-05-30 NOTE — PROGRESS NOTES
Weight Management Medical Nutrition Assessment  Alix was here today as a new start in the [facility] program.  Today she weighs 294.4 and has a short term goal of 260 lbs and a long term goal of 175 - 180 lbs. She is not currently logging her food and has not been exercising regularly.  She reports that she does eat out several times a week and drinks juice most days.  She plans to not have any more juice once its gone.  Reviewed her calorie carb and protein needs.  Recommend that she start food logging and exercise regularly.    Alix participated and completed HC 2 -3 years ago, and is coming back for the accountability.     Patient seen by Medical Provider in past 6 months:  yes  Requested to schedule appointment with Medical Provider: No      Anthropometric Measurements  Start Weight (#): 294.4  Current Weight (#): 294.4  TBW % Change from start weight:0%  Ideal Body Weight (#):143.75  Goal Weight (#):long term 175 short term 260  Highest: 319 lbs  Lowest: 220    Weight Loss History  Previous weight loss attempts: Commercial Programs (Weight Watchers, Cintia Zhang, etc.)  [facility]    Food and Nutrition Related History  Wake up: 8    Bed Time:12 - 2 am    Food Recall  Breakfast:bagel and fruit    Snack:freezer pop  Lunch:sandwich with turkey and chips and juice  Snack:none  Dinner:eating out or chicken with pasta or mayank;s take out  Snack:sweets      Beverages: water, juice  Volume of beverage intake: 80    Weekends: Same  Cravings: sweets  Trouble area of day:dinner and evenings    Frequency of Eating out: 5 - 6 times a week  Food restrictions:none  Cooking: self   Food Shopping: self    Physical Activity Intake  Activity:none  Frequency: none  Physical limitations/barriers to exercise: none    Estimated Needs  Energy    [place] Energy Needs: BMR : 2072   1-2# loss weekly sedentary:  1486 - 1986             1-2# loss weekly lightly active:4128 - 7849  Maintenance calories for sedentary activity  level: 2486  Protein:78 - 98      (1.2-1.5g/kg IBW)  Fluid: 76     (35mL/kg IBW)    Nutrition Diagnosis  Yes;    Overweight/obesity  related to Excess energy intake as evidenced by  BMI more than normative standard for age and sex (obesity-grade III 40+)       Nutrition Intervention    Nutrition Prescription  Calories:1600 - 1800  Protein:78 - 98  Fluid:76    Meal Plan (Hayes/Pro/Carb)  Breakfast: 400/20/30  Snack:  Lunch:400/28/30  Snack: 150/10/15  Dinner:500/28/30  Snack:150/10/15    Nutrition Education:    [facility] Manual  Calorie controlled menu  Lean protein food choices  Healthy snack options  Food journaling tips      Nutrition Counseling:  Strategies: meal planning, portion sizes, healthy snack choices, hydration, fiber intake, protein intake, exercise, food journal      Monitoring and Evaluation:  Evaluation criteria:  Energy Intake  Meet protein needs  Maintain adequate hydration  Monitor weekly weight  Meal planning/preparation  Food journal   Decreased portions at mealtimes and snacks  Physical activity     Barriers to learning:none  Readiness to change: Action:  (Changing behavior)  Comprehension: fair  Expected Compliance: fair

## 2024-06-05 ENCOUNTER — CLINICAL SUPPORT (OUTPATIENT)
Dept: BARIATRICS | Facility: CLINIC | Age: 39
End: 2024-06-05

## 2024-06-05 VITALS — HEIGHT: 69 IN | BODY MASS INDEX: 43.4 KG/M2 | WEIGHT: 293 LBS

## 2024-06-05 DIAGNOSIS — R63.5 ABNORMAL WEIGHT GAIN: Primary | ICD-10-CM

## 2024-06-05 PROCEDURE — RECHECK

## 2024-06-12 ENCOUNTER — CLINICAL SUPPORT (OUTPATIENT)
Dept: BARIATRICS | Facility: CLINIC | Age: 39
End: 2024-06-12

## 2024-06-12 VITALS — HEIGHT: 69 IN | BODY MASS INDEX: 43.4 KG/M2 | WEIGHT: 293 LBS

## 2024-06-12 DIAGNOSIS — R63.5 ABNORMAL WEIGHT GAIN: Primary | ICD-10-CM

## 2024-06-12 PROCEDURE — RECHECK

## 2024-06-19 ENCOUNTER — CLINICAL SUPPORT (OUTPATIENT)
Dept: BARIATRICS | Facility: CLINIC | Age: 39
End: 2024-06-19

## 2024-06-19 VITALS — WEIGHT: 292.4 LBS | BODY MASS INDEX: 43.31 KG/M2 | HEIGHT: 69 IN

## 2024-06-19 DIAGNOSIS — R63.5 ABNORMAL WEIGHT GAIN: Primary | ICD-10-CM

## 2024-06-19 PROCEDURE — RECHECK

## 2024-06-19 NOTE — PROGRESS NOTES
Weight Management Medical Nutrition Assessment  Alix was here today for her 2 week follow-up in the [facility] program.  Today she weighs 292.4 giving her a loss of 2 lbs in the last 2 weeks.  She admits that last week wasn't her best since work was strenuous, but overall she has been making healthier food choices.  She is logging, but admits that it is not always consistent.  She is not currently exercising, but is planning to start. Reminded her about the [employer] program as well.     Completed a body composition using SECA scale and reviewed results with patient.      Patient seen by Medical Provider in past 6 months:  yes  Requested to schedule appointment with Medical Provider: No        Anthropometric Measurements  Start Weight (#): 294.4  Current Weight (#): 292.4  TBW % Change from start weight:1%  Ideal Body Weight (#):143.75  Goal Weight (#):long term 175 short term 260  Highest: 319 lbs  Lowest: 220     Weight Loss History  Previous weight loss attempts: Commercial Programs (Weight Watchers, Cintia Vicente, etc.)  [facility]     Food and Nutrition Related History  Wake up: 8        Bed Time:12 - 2 am     Food Recall  Breakfast: breakfast sandwich and berries                      Snack:none  Lunch: salad with protein  Snack:none  Dinner: meatball and spaghetti  Snack:fruit        Beverages: water, juice  Volume of beverage intake: 80     Weekends: Same  Cravings: sweets  Trouble area of day:dinner and evenings     Frequency of Eating out: 5 - 6 times a week cutting back  Food restrictions:none  Cooking: self   Food Shopping: self     Physical Activity Intake  Activity:none  Frequency: none  Physical limitations/barriers to exercise: none     Estimated Needs  Energy     Di Amezquita Energy Needs: BMR : 2072   1-2# loss weekly sedentary:  1486 - 1986             1-2# loss weekly lightly active:1849 - 2349  Maintenance calories for sedentary activity level: 2486  Protein:78 - 98      (1.2-1.5g/kg  IBW)  Fluid: 76     (35mL/kg IBW)     Nutrition Diagnosis  Yes;   Nutrition Diagnosis

## 2024-06-26 ENCOUNTER — CLINICAL SUPPORT (OUTPATIENT)
Dept: BARIATRICS | Facility: CLINIC | Age: 39
End: 2024-06-26

## 2024-06-26 VITALS — HEIGHT: 69 IN | WEIGHT: 291.2 LBS | BODY MASS INDEX: 43.13 KG/M2

## 2024-06-26 DIAGNOSIS — R63.5 ABNORMAL WEIGHT GAIN: Primary | ICD-10-CM

## 2024-06-26 PROCEDURE — RECHECK

## 2024-07-02 ENCOUNTER — CLINICAL SUPPORT (OUTPATIENT)
Dept: BARIATRICS | Facility: CLINIC | Age: 39
End: 2024-07-02
Payer: COMMERCIAL

## 2024-07-02 VITALS — WEIGHT: 292 LBS | BODY MASS INDEX: 43.25 KG/M2 | HEIGHT: 69 IN

## 2024-07-02 DIAGNOSIS — R63.5 ABNORMAL WEIGHT GAIN: Primary | ICD-10-CM

## 2024-07-02 PROCEDURE — 97803 MED NUTRITION INDIV SUBSEQ: CPT

## 2024-07-02 PROCEDURE — RECHECK

## 2024-07-10 ENCOUNTER — CLINICAL SUPPORT (OUTPATIENT)
Dept: BARIATRICS | Facility: CLINIC | Age: 39
End: 2024-07-10

## 2024-07-10 VITALS — BODY MASS INDEX: 43.04 KG/M2 | HEIGHT: 69 IN | WEIGHT: 290.6 LBS

## 2024-07-10 DIAGNOSIS — R63.5 ABNORMAL WEIGHT GAIN: Primary | ICD-10-CM

## 2024-07-10 PROCEDURE — RECHECK

## 2024-07-22 ENCOUNTER — CLINICAL SUPPORT (OUTPATIENT)
Dept: BARIATRICS | Facility: CLINIC | Age: 39
End: 2024-07-22

## 2024-07-22 DIAGNOSIS — R63.5 ABNORMAL WEIGHT GAIN: Primary | ICD-10-CM

## 2024-07-22 PROCEDURE — RECHECK

## 2024-07-22 NOTE — PROGRESS NOTES
"Weight Management Medical Nutrition Assessment  Alix was here today for her 2 month follow-up in the [facility] program.  Today she weighs 290.6 lbs  giving her a loss of 1.6 lbs in the last 4 weeks.  She is logging her food but admits that she is not always consistent.  She has been starting to meal prep and plan.  Discussed the website \"[employer] to give her some recipe ideas.  She was asking about various frozen meals (Evolve, Healthy Choice and Smart Ones) just to have on had for when she has a busier work day.  She has not started any regular exercise, but is planning to start walking.         Patient seen by Medical Provider in past 6 months:  yes  Requested to schedule appointment with Medical Provider: No        Anthropometric Measurements  Start Weight (#): 294.4  Current Weight (#): 290.6  TBW % Change from start weight:2%  Ideal Body Weight (#):143.75  Goal Weight (#):long term 175 short term 260  Highest: 319 lbs  Lowest: 220     Weight Loss History  Previous weight loss attempts: Commercial Programs (Weight Watchers, Cintia Zhang, etc.)  [facility]     Food and Nutrition Related History  Wake up: 8        Bed Time:12 - 2 am     Food Recall  Breakfast: breakfast sandwich and berries                      Snack:none  Lunch: salad with protein  Snack:none  Dinner: meatball and spaghetti  Snack:fruit        Beverages: water, juice  Volume of beverage intake: 80     Weekends: Same  Cravings: sweets  Trouble area of day:dinner and evenings     Frequency of Eating out: 5 - 6 times a week cutting back  Food restrictions:none  Cooking: self   Food Shopping: self     Physical Activity Intake  Activity:none  Frequency: none  Physical limitations/barriers to exercise: none     Estimated Needs  Energy     [employer] [place] Energy Needs: BMR : 2072   1-2# loss weekly sedentary:  1486 - 1986             1-2# loss weekly lightly active:4643 - 4647  Maintenance calories for sedentary activity level: " 2486  Protein:78 - 98      (1.2-1.5g/kg IBW)  Fluid: 76     (35mL/kg IBW)     Nutrition Diagnosis  Yes;   Nutrition Diagnosis

## 2024-07-24 ENCOUNTER — CLINICAL SUPPORT (OUTPATIENT)
Dept: BARIATRICS | Facility: CLINIC | Age: 39
End: 2024-07-24

## 2024-07-24 VITALS — BODY MASS INDEX: 43.04 KG/M2 | WEIGHT: 290.6 LBS | HEIGHT: 69 IN

## 2024-07-24 DIAGNOSIS — R63.5 ABNORMAL WEIGHT GAIN: Primary | ICD-10-CM

## 2024-07-24 PROCEDURE — RECHECK

## 2024-08-07 ENCOUNTER — CLINICAL SUPPORT (OUTPATIENT)
Dept: BARIATRICS | Facility: CLINIC | Age: 39
End: 2024-08-07

## 2024-08-07 VITALS — WEIGHT: 290.6 LBS | HEIGHT: 69 IN | BODY MASS INDEX: 43.04 KG/M2

## 2024-08-07 DIAGNOSIS — R63.5 ABNORMAL WEIGHT GAIN: Primary | ICD-10-CM

## 2024-08-07 PROCEDURE — RECHECK

## 2024-08-14 ENCOUNTER — CLINICAL SUPPORT (OUTPATIENT)
Dept: BARIATRICS | Facility: CLINIC | Age: 39
End: 2024-08-14
Payer: COMMERCIAL

## 2024-08-14 VITALS — BODY MASS INDEX: 42.98 KG/M2 | WEIGHT: 290.2 LBS | HEIGHT: 69 IN

## 2024-08-14 DIAGNOSIS — R63.5 ABNORMAL WEIGHT GAIN: Primary | ICD-10-CM

## 2024-08-14 PROCEDURE — RECHECK

## 2024-08-14 PROCEDURE — 97803 MED NUTRITION INDIV SUBSEQ: CPT

## 2024-08-20 ENCOUNTER — CLINICAL SUPPORT (OUTPATIENT)
Dept: BARIATRICS | Facility: CLINIC | Age: 39
End: 2024-08-20

## 2024-08-20 DIAGNOSIS — R63.5 ABNORMAL WEIGHT GAIN: Primary | ICD-10-CM

## 2024-08-20 PROCEDURE — RECHECK

## 2024-08-20 NOTE — PROGRESS NOTES
Weight Management Medical Nutrition Assessment  Alix was here today for her month 3 follow-up and REE in the Healthy [employer] program.  Today she weighs 293.4.  She does log her food but admits that it is not always consistently.  She has not been exercising, but does plan to start.  Reiterated the importance of accurate food logging and regular exercise.      Reevue indirect calorimter revealed REE is  fast (+13%)        compared to the predictive normal for someone her same age, height, and gender.   Reevue Indirect Calorimeter REE:   2347         Weight loss without exercise:    1879 - 2347       Weight loss with exercise:   +244                   Maintenance:   2347 - 3049        .        Patient seen by Medical Provider in past 6 months:  yes  Requested to schedule appointment with Medical Provider: No        Anthropometric Measurements  Start Weight (#): 294.4  Current Weight (#): 293.4  TBW % Change from start weight:0%  Ideal Body Weight (#):143.75  Goal Weight (#):long term 175 short term 260  Highest: 319 lbs  Lowest: 220     Weight Loss History  Previous weight loss attempts: Commercial Programs (Weight Watchers, [employer], etc.)  [facility]     Food and Nutrition Related History  Wake up: 8        Bed Time:12 - 2 am     Food Recall  Breakfast:bagel and fruit                       Snack:freezer pop  Lunch:sandwich with turkey and chips and juice  Snack:none  Dinner:eating out or chicken with pasta or mayank;s take out  Snack:sweets        Beverages: water, juice  Volume of beverage intake: 80     Weekends: Same  Cravings: sweets  Trouble area of day:dinner and evenings     Frequency of Eating out: 5 - 6 times a week  Food restrictions:none  Cooking: self   Food Shopping: self     Physical Activity Intake  Activity:none  Frequency: none  Physical limitations/barriers to exercise: none     Estimated Needs  Energy     [place] Energy Needs: BMR : 2072   1-2# loss weekly sedentary:  1486 - 9860              1-2# loss weekly lightly active:3529 - 7259  Maintenance calories for sedentary activity level: 2486  Protein:78 - 98      (1.2-1.5g/kg IBW)  Fluid: 76     (35mL/kg IBW)     Nutrition Diagnosis  Yes;   Nutrition Diagnosis[]Expand by Default  Overweight/obesity  related to Excess energy intake as evidenced by  BMI more than normative standard for age and sex (obesity-grade III 40+)     Nutrition Intervention     Nutrition Prescription  Calories:1600 - 1800  Protein:78 - 98  Fluid:76     Meal Plan (Hayes/Pro/Carb)  Breakfast: 400/20/30  Snack:  Lunch:400/28/30  Snack: 150/10/15  Dinner:500/28/30  Snack:150/10/15     Nutrition Education:    [facility] Manual  Calorie controlled menu  Lean protein food choices  Healthy snack options  Food journaling tips        Nutrition Counseling:  Strategies: meal planning, portion sizes, healthy snack choices, hydration, fiber intake, protein intake, exercise, food journal        Monitoring and Evaluation:  Evaluation criteria:  Energy Intake  Meet protein needs  Maintain adequate hydration  Monitor weekly weight  Meal planning/preparation  Food journal   Decreased portions at mealtimes and snacks  Physical activity      Barriers to learning:none  Readiness to change: Action:  (Changing behavior)  Comprehension: fair  Expected Compliance: fair

## 2024-08-21 ENCOUNTER — CLINICAL SUPPORT (OUTPATIENT)
Dept: BARIATRICS | Facility: CLINIC | Age: 39
End: 2024-08-21

## 2024-08-21 DIAGNOSIS — R63.5 ABNORMAL WEIGHT GAIN: Primary | ICD-10-CM

## 2024-08-21 PROCEDURE — RECHECK

## 2024-08-22 VITALS — HEIGHT: 69 IN | BODY MASS INDEX: 43.25 KG/M2 | WEIGHT: 292 LBS

## 2024-08-26 DIAGNOSIS — K76.0 FATTY LIVER: ICD-10-CM

## 2024-08-26 DIAGNOSIS — R73.03 PREDIABETES: ICD-10-CM

## 2024-08-26 DIAGNOSIS — E28.2 PCOS (POLYCYSTIC OVARIAN SYNDROME): ICD-10-CM

## 2024-08-26 DIAGNOSIS — E66.01 OBESITY, CLASS III, BMI 40-49.9 (MORBID OBESITY) (HCC): ICD-10-CM

## 2024-08-26 NOTE — TELEPHONE ENCOUNTER
Reason for call:   [x] Refill   [] Prior Auth  [] Other:     Office:   [] PCP/Provider -   [x] Specialty/Provider - [facility] / Mar Aleman MD     Medication:   metFORMIN (GLUCOPHAGE-XR) 750 mg 24 hr tablet - Take 1 tablet (750 mg total) by mouth daily with dinner     Pharmacy:   [facility] - [place], [place] - [address]     Does the patient have enough for 3 days?   [x] Yes   [] No - Send as HP to POD

## 2024-08-28 ENCOUNTER — CLINICAL SUPPORT (OUTPATIENT)
Dept: BARIATRICS | Facility: CLINIC | Age: 39
End: 2024-08-28

## 2024-08-28 VITALS — WEIGHT: 293 LBS | HEIGHT: 69 IN | BODY MASS INDEX: 43.4 KG/M2

## 2024-08-28 DIAGNOSIS — R63.5 ABNORMAL WEIGHT GAIN: Primary | ICD-10-CM

## 2024-08-28 PROCEDURE — RECHECK

## 2024-08-28 RX ORDER — METFORMIN HYDROCHLORIDE 750 MG/1
750 TABLET, EXTENDED RELEASE ORAL
Qty: 90 TABLET | Refills: 0 | Status: SHIPPED | OUTPATIENT
Start: 2024-08-28

## 2024-09-10 ENCOUNTER — APPOINTMENT (OUTPATIENT)
Dept: LAB | Facility: HOSPITAL | Age: 39
End: 2024-09-10
Payer: COMMERCIAL

## 2024-09-10 DIAGNOSIS — Z00.8 HEALTH EXAMINATION IN POPULATION SURVEY: ICD-10-CM

## 2024-09-10 LAB
CHOLEST SERPL-MCNC: 150 MG/DL
HDLC SERPL-MCNC: 42 MG/DL
LDLC SERPL CALC-MCNC: 87 MG/DL (ref 0–100)
NONHDLC SERPL-MCNC: 108 MG/DL
TRIGL SERPL-MCNC: 107 MG/DL

## 2024-09-10 PROCEDURE — 80061 LIPID PANEL: CPT

## 2024-09-10 PROCEDURE — 83036 HEMOGLOBIN GLYCOSYLATED A1C: CPT

## 2024-09-10 PROCEDURE — 36415 COLL VENOUS BLD VENIPUNCTURE: CPT

## 2024-09-11 ENCOUNTER — CLINICAL SUPPORT (OUTPATIENT)
Dept: BARIATRICS | Facility: CLINIC | Age: 39
End: 2024-09-11

## 2024-09-11 VITALS — WEIGHT: 293 LBS | BODY MASS INDEX: 43.4 KG/M2 | HEIGHT: 69 IN

## 2024-09-11 DIAGNOSIS — R63.5 ABNORMAL WEIGHT GAIN: Primary | ICD-10-CM

## 2024-09-11 LAB
EST. AVERAGE GLUCOSE BLD GHB EST-MCNC: 103 MG/DL
HBA1C MFR BLD: 5.2 %

## 2024-09-11 PROCEDURE — RECHECK

## 2024-11-01 ENCOUNTER — TELEMEDICINE (OUTPATIENT)
Dept: OTHER | Facility: HOSPITAL | Age: 39
End: 2024-11-01
Payer: COMMERCIAL

## 2024-11-01 DIAGNOSIS — N30.00 ACUTE CYSTITIS WITHOUT HEMATURIA: Primary | ICD-10-CM

## 2024-11-01 PROBLEM — E78.2 MIXED HYPERLIPIDEMIA: Status: RESOLVED | Noted: 2018-11-09 | Resolved: 2024-11-01

## 2024-11-01 PROCEDURE — 99213 OFFICE O/P EST LOW 20 MIN: CPT | Performed by: PHYSICIAN ASSISTANT

## 2024-11-01 RX ORDER — PHENAZOPYRIDINE HYDROCHLORIDE 200 MG/1
200 TABLET, FILM COATED ORAL
Qty: 10 TABLET | Refills: 0 | Status: SHIPPED | OUTPATIENT
Start: 2024-11-01 | End: 2024-11-03

## 2024-11-01 RX ORDER — NITROFURANTOIN 25; 75 MG/1; MG/1
100 CAPSULE ORAL 2 TIMES DAILY
Qty: 14 CAPSULE | Refills: 0 | Status: SHIPPED | OUTPATIENT
Start: 2024-11-01 | End: 2024-11-08

## 2024-11-02 NOTE — PROGRESS NOTES
Virtual Regular Visit  Name: Alix Garcia      : 1985      MRN: [id number]  Encounter Provider: Shannon D Severino, PA-C  Encounter Date: 2024   Encounter department: VIRTUAL CARE     Verification of patient location:    Patient is located at Home in the following state in which I hold an active license [place]    Assessment & Plan  Acute cystitis without hematuria    Orders:    nitrofurantoin (MACROBID) 100 mg capsule; Take 1 capsule (100 mg total) by mouth 2 (two) times a day for 7 days    phenazopyridine (PYRIDIUM) 200 mg tablet; Take 1 tablet (200 mg total) by mouth 3 (three) times a day with meals for 2 days         Encounter provider Shannon D Severino, PA-C    The patient was identified by name and date of birth. Alix Garcia was informed that this is a telemedicine visit and that the visit is being conducted through the Epic Embedded platform. She agrees to proceed..  My office door was closed. No one else was in the room.  She acknowledged consent and understanding of privacy and security of the video platform. The patient has agreed to participate and understands they can discontinue the visit at any time.    Patient is aware this is a billable service.     History of Present Illness     UTI since yesterday. Has pain at end of stream- has urgency, frequency, and voiding small amounts. Started menstruating today. Took tylenol this morning. No back pain or fevers. No hematuria, no change to vaginal discharge, concern for STI or pregnancy.         History obtained from : patient  Review of Systems   Constitutional:  Negative for fever.   HENT:  Negative for nosebleeds.    Eyes:  Negative for redness.   Respiratory:  Negative for shortness of breath.    Cardiovascular:  Negative for chest pain.   Gastrointestinal:  Negative for blood in stool.   Genitourinary:  Positive for decreased urine volume, dysuria, frequency, urgency and vaginal bleeding. Negative for hematuria and vaginal discharge.    Musculoskeletal:  Negative for gait problem.   Skin:  Negative for rash.   Neurological:  Negative for seizures.   Psychiatric/Behavioral:  Negative for behavioral problems.      Medical History Reviewed by provider this encounter:  Meds  Problems           Objective     There were no vitals taken for this visit.  Physical Exam  Constitutional:       General: She is not in acute distress.     Appearance: Normal appearance. She is obese. She is not ill-appearing or toxic-appearing.      Comments: pleasant   HENT:      Head: Normocephalic and atraumatic.      Nose: No rhinorrhea.      Mouth/Throat:      Mouth: Mucous membranes are moist.   Eyes:      Conjunctiva/sclera: Conjunctivae normal.      Comments: glasses   Pulmonary:      Effort: Pulmonary effort is normal. No respiratory distress.      Breath sounds: No wheezing (no gross audible wheeze through computer).   Musculoskeletal:      Cervical back: Normal range of motion.   Skin:     Findings: No rash (on face or neck).   Neurological:      Mental Status: She is alert.      Cranial Nerves: No dysarthria or facial asymmetry.   Psychiatric:         Mood and Affect: Mood normal.         Behavior: Behavior normal.         Visit Time  Total Visit Duration: 10 min

## 2024-12-30 DIAGNOSIS — E28.2 PCOS (POLYCYSTIC OVARIAN SYNDROME): ICD-10-CM

## 2024-12-30 DIAGNOSIS — L68.0 HIRSUTISM: ICD-10-CM

## 2024-12-30 DIAGNOSIS — E66.01 OBESITY, CLASS III, BMI 40-49.9 (MORBID OBESITY) (HCC): ICD-10-CM

## 2024-12-30 NOTE — TELEPHONE ENCOUNTER
Reason for call:   [x] Refill   [] Prior Auth  [] Other:     Office:   [] PCP/Provider -   [x] Specialty/Provider - weight management    Medication:   Spironolactone 100mg- take 1 tablet by mouth daily      Ph[facility]: [facility]    Does the patient have enough for 3 days?   [] Yes   [x] No - Send as HP to POD

## 2025-01-02 NOTE — TELEPHONE ENCOUNTER
Per chart review Spironolactone was originally prescribed by Dr. Aleman 6/8/23 and has been managed by her since that date. Please review.

## 2025-01-02 NOTE — TELEPHONE ENCOUNTER
Please route to appropriate office. This is a blood pressure medication not a weight management mediation

## 2025-01-03 DIAGNOSIS — E66.01 OBESITY, CLASS III, BMI 40-49.9 (MORBID OBESITY) (HCC): ICD-10-CM

## 2025-01-03 DIAGNOSIS — L68.0 HIRSUTISM: ICD-10-CM

## 2025-01-03 DIAGNOSIS — E28.2 PCOS (POLYCYSTIC OVARIAN SYNDROME): ICD-10-CM

## 2025-01-03 RX ORDER — SPIRONOLACTONE 100 MG/1
100 TABLET, FILM COATED ORAL DAILY
Qty: 90 TABLET | Refills: 1 | Status: SHIPPED | OUTPATIENT
Start: 2025-01-03

## 2025-01-03 RX ORDER — SPIRONOLACTONE 100 MG/1
100 TABLET, FILM COATED ORAL DAILY
Qty: 90 TABLET | Refills: 0 | OUTPATIENT
Start: 2025-01-03

## 2025-01-03 RX ORDER — SPIRONOLACTONE 100 MG/1
100 TABLET, FILM COATED ORAL DAILY
Qty: 90 TABLET | Refills: 1 | OUTPATIENT
Start: 2025-01-03

## 2025-01-03 NOTE — TELEPHONE ENCOUNTER
Love Trevino is no longer with [facility] and upon chart review she is not scheduled with any of our providers moving forward. Please ask her to have this addressed by her PCP.

## 2025-01-16 NOTE — PROGRESS NOTES
Assessment/Plan:     Class 3 severe obesity due to excess calories with serious comorbidity and body mass index (BMI) of 40.0 to 44.9 in adult (HCC)  - Patient is pursuing Conservative Program and follow up visits with medical weight management provider  - Initial weight loss goal of 5-10% weight loss for improved health. Weight loss can improve patient's co-morbid conditions and/or prevent weight-related complications.  - Explained the importance of continuing lifestyle changes in addition to any anti-obesity medications.   - Labs reviewed from 6/2024    General Recommendations:  Nutrition:  Eat breakfast daily.  Do not skip meals.      Food log (ie.) www.[email or web address], sparkpeople.com, loseit.com, [employer].com, etc.     Practice mindful eating.  Be sure to set aside time to eat, eat slowly, and savor your food.     Hydration:    At least 64oz of water daily.  No sugar sweetened beverages.  No juice (eat the fruit instead).     Exercise:  Studies have shown that the ideal exercise goal is somewhere between 150 to 300 minutes of moderate intensity exercise a week.  Start with exercising 10 minutes every other day and gradually increase physical activity with a goal of at least 150 minutes of moderate intensity exercise a week, divided over at least 3 days a week.  An example of this would be exercising 30 minutes a day, 5 days a week.  Resistance training can increase muscle mass and increase our resting metabolic rate.   FULL BODY resistance training is recommended 2-3 times a week.  Do not do this on consecutive days to allow for muscle recovery.     Aim for a bare minimum 5000 steps, even on days you do not exercise.     Monitoring:   Weigh yourself daily.  If this causes undue stress, then just weigh yourself once a week.  Weigh yourself the same time of the day with the same amount of clothing on.  Preferably this should be done after waking up, before you eat, and with no clothing or minimal clothing  on.     Specific Goals:  Discussed her lifestyle and nutrition and she admits she has been off track lately and not really trying to monitor her intake. She has seen Britta in the past and has her meal plans from her that she plans to start focusing on again.   Encouraged to start tracking he food.   Eat at least 3 meals per day with a focus on protein, do not skip meals. Protein rich snacks discussed and protein shake options discussed.  Exercise 1-2 days per week to start for 10-15 minutes per day and increase from there.  Importance of adding strength and resistance training discussed.   Discussed her medications and she is interested in switching to zepbound from contrave as she may not have the same side effect issues that she had with the Wegovy. Patient denies personal history of pancreatitis. Patient also denies personal and family history of medullary thyroid cancer and multiple endocrine neoplasia type 2 (MEN 2 tumor). Patient denies any history of kidney stones, seizures, or glaucoma.   Zepbound Instructions Reviewed:  - Begin Zepbound 2.5 mg subcutaneously once a week. Dose changes may occur after 4 doses if medication is tolerated. You will be assessed prior to each dose change to make sure you are tolerating the medication well.  - Please message me when you have 2 pens left from the prescription so there are no lapses in treatment.  - If you have been off the medication for more than 14 days please contact the office as you will need to restart the titration at the starting dose again to avoid significant side effects or adverse events.  - Visit [email or web address].[email or web address] for further information/injection instructions.   -Please eat small frequent meals to help reduce nausea. Lemon water and saltine crackers may help with this.   - Side effects of Zepbound discussed: nausea, vomiting, diarrhea, and constipation. If you experience fever, nausea/vomiting, and pain radiating to your back this may be a sign of  pancreatitis. Please go to the emergency room if this occurs.  - If on oral birth control a 2nd method of birth control is recommended during the 1st 8 weeks of therapy and for 4 weeks after any dosage change.   - Patient understands the side effects of the medication and proper administration. Patient agrees with the treatment plan and all questions were answered.   She will wean off of the Wellbutrin in the meantime.   Medication consent was reviewed today and all questions were answered.  Patient agreed with all bulleted points.  Consent was signed, scanned into chart and patient was provided with a copy for their records.   Follow up in 3 months.          Diagnoses and all orders for this visit:    Class 3 severe obesity due to excess calories with serious comorbidity and body mass index (BMI) of 40.0 to 44.9 in adult (HCC)  -     metFORMIN (GLUCOPHAGE-XR) 750 mg 24 hr tablet; Take 1 tablet (750 mg total) by mouth daily with dinner  -     tirzepatide (Zepbound) 2.5 mg/0.5 mL auto-injector; Inject 0.5 mL (2.5 mg total) under the skin once a week for 28 days    Prediabetes  -     metFORMIN (GLUCOPHAGE-XR) 750 mg 24 hr tablet; Take 1 tablet (750 mg total) by mouth daily with dinner  -     tirzepatide (Zepbound) 2.5 mg/0.5 mL auto-injector; Inject 0.5 mL (2.5 mg total) under the skin once a week for 28 days    Fatty liver  -     tirzepatide (Zepbound) 2.5 mg/0.5 mL auto-injector; Inject 0.5 mL (2.5 mg total) under the skin once a week for 28 days    PCOS (polycystic ovarian syndrome)  -     metFORMIN (GLUCOPHAGE-XR) 750 mg 24 hr tablet; Take 1 tablet (750 mg total) by mouth daily with dinner        Total time spent reviewing chart, interviewing patient, examining patient, discussing plan, answering all questions, and documentin minutes with >50% face-to-face time with the patient.    Follow up in approximately 3 months with Non-Surgical Physician/Advanced Practitioner.    Subjective:   No chief complaint on  file.      Patient ID: Alix Garcia  is a 39 y.o. female with excess weight/obesity here to pursue weight management.  Patient is pursuing Conservative Program.   Most recent notes and records were reviewed.    HPI    Wt Readings from Last 20 Encounters:   01/17/25 134 kg (294 lb 9.6 oz)   09/11/24 134 kg (295 lb 3.2 oz)   08/28/24 134 kg (294 lb 6.4 oz)   08/22/24 132 kg (292 lb)   08/14/24 132 kg (290 lb 3.2 oz)   08/07/24 132 kg (290 lb 9.6 oz)   07/24/24 132 kg (290 lb 9.6 oz)   07/10/24 132 kg (290 lb 9.6 oz)   07/02/24 132 kg (292 lb)   06/26/24 132 kg (291 lb 3.2 oz)   06/19/24 133 kg (292 lb 6.4 oz)   06/12/24 135 kg (298 lb 6.4 oz)   06/05/24 134 kg (296 lb 3.2 oz)   05/30/24 134 kg (294 lb 6.4 oz)   05/10/24 134 kg (296 lb)   05/01/24 134 kg (295 lb 12.8 oz)   11/01/23 132 kg (291 lb)   10/18/23 134 kg (295 lb)   09/14/23 133 kg (294 lb)   06/08/23 133 kg (293 lb 4.8 oz)       Patient presents today to medical weight management office for follow up. She is here to follow up after being on contrave. She admits that she has probably not been taking the medication correctly, she takes the Wellbutrin once daily instead of twice daily and she never received the naltrexone so she never took it. So subsequently she is not feeling much different on the Wellbutrin. She has tried Wegovy in the past but had side effects when she got to 0.5mg so was taken off and started on naltrexone. She is also on metformin currently for PCOS.         Weight loss medication and dose: Wellbutrin 150 mg BID / Naltrexone 25 mg BID  Started weight and date: 311 lbs 6/9/22  Current weight: 294 lbs   Difference: -17 lbs (5% loss)    Starting BMI: 45  Current BMI: 44              Food recall   B- Bagel with CC and strawberries   L- Cup Of soups   S- granola bar and string cheese   D- turkey sandwich and fruit   Take out frequency: a couple times a week     Hydration- Water mostly   Alcohol- rare  Exercise- no     Colonoscopy:  na  Mammogram: na      The following portions of the patient's history were reviewed and updated as appropriate: allergies, current medications, past family history, past medical history, past social history, past surgical history, and problem list.    Family History   Problem Relation Age of Onset    Breast cancer Mother     Alcohol abuse Father     Heart failure Father     Heart disease Father     Hypertension Father     Breast cancer Maternal Grandmother     Diabetes Paternal Grandmother     Bipolar disorder Paternal Aunt     Alcohol abuse Paternal Aunt     Colon cancer Paternal Uncle     Stroke Paternal Uncle     Ovarian cancer Neg Hx     Uterine cancer Neg Hx     Cervical cancer Neg Hx     Thyroid disease Neg Hx         Review of Systems   Constitutional:  Negative for fatigue.   HENT:  Negative for sore throat.    Respiratory:  Negative for cough and shortness of breath.    Cardiovascular:  Negative for chest pain, palpitations and leg swelling.   Gastrointestinal:  Negative for abdominal pain, constipation, diarrhea, nausea and vomiting.   Genitourinary:  Negative for dysuria.   Musculoskeletal:  Negative for arthralgias and back pain.   Skin:  Negative for rash.   Neurological:  Negative for headaches.   Psychiatric/Behavioral:  Negative for dysphoric mood. The patient is not nervous/anxious.        Objective:  /84 (BP Location: Left arm, Patient Position: Sitting, Cuff Size: Large)   Pulse 84   Wt 134 kg (294 lb 9.6 oz)   SpO2 96%   BMI 44.14 kg/m²     Physical Exam  Vitals and nursing note reviewed.   Constitutional:       Appearance: Normal appearance. She is obese.   HENT:      Head: Normocephalic.   Pulmonary:      Effort: Pulmonary effort is normal.   Neurological:      Mental Status: She is oriented to person, place, and time.   Psychiatric:         Mood and Affect: Mood normal.         Behavior: Behavior normal.         Thought Content: Thought content normal.         Judgment: Judgment  normal.            Labs   Most recent labs reviewed   Lab Results   Component Value Date    SODIUM 136 09/14/2023    K 4.8 09/14/2023     09/14/2023    CO2 26 09/14/2023    AGAP 5 09/14/2023    BUN 14 09/14/2023    CREATININE 1.03 09/14/2023    GLUC 80 11/26/2018    GLUF 85 09/14/2023    CALCIUM 9.7 09/14/2023    AST 26 06/07/2023    ALT 33 06/07/2023    ALKPHOS 42 06/07/2023    TP 7.9 06/07/2023    TBILI 0.61 06/07/2023    EGFR 69 09/14/2023     Lab Results   Component Value Date    HGBA1C 5.2 09/10/2024     Lab Results   Component Value Date    RKG0WXINWZFV 2.107 01/25/2023    TSH 1.72 11/09/2018     Lab Results   Component Value Date    CHOLESTEROL 150 09/10/2024     Lab Results   Component Value Date    HDL 42 (L) 09/10/2024     Lab Results   Component Value Date    TRIG 107 09/10/2024     Lab Results   Component Value Date    LDLCALC 87 09/10/2024

## 2025-01-17 ENCOUNTER — OFFICE VISIT (OUTPATIENT)
Dept: BARIATRICS | Facility: CLINIC | Age: 40
End: 2025-01-17
Payer: COMMERCIAL

## 2025-01-17 VITALS
WEIGHT: 293 LBS | HEART RATE: 84 BPM | BODY MASS INDEX: 44.14 KG/M2 | DIASTOLIC BLOOD PRESSURE: 84 MMHG | SYSTOLIC BLOOD PRESSURE: 110 MMHG | OXYGEN SATURATION: 96 %

## 2025-01-17 DIAGNOSIS — K76.0 FATTY LIVER: ICD-10-CM

## 2025-01-17 DIAGNOSIS — R73.03 PREDIABETES: ICD-10-CM

## 2025-01-17 DIAGNOSIS — E66.01 CLASS 3 SEVERE OBESITY DUE TO EXCESS CALORIES WITH SERIOUS COMORBIDITY AND BODY MASS INDEX (BMI) OF 40.0 TO 44.9 IN ADULT (HCC): Primary | ICD-10-CM

## 2025-01-17 DIAGNOSIS — E66.813 CLASS 3 SEVERE OBESITY DUE TO EXCESS CALORIES WITH SERIOUS COMORBIDITY AND BODY MASS INDEX (BMI) OF 40.0 TO 44.9 IN ADULT (HCC): Primary | ICD-10-CM

## 2025-01-17 DIAGNOSIS — E28.2 PCOS (POLYCYSTIC OVARIAN SYNDROME): ICD-10-CM

## 2025-01-17 PROCEDURE — 99214 OFFICE O/P EST MOD 30 MIN: CPT

## 2025-01-17 RX ORDER — TIRZEPATIDE 2.5 MG/.5ML
2.5 INJECTION, SOLUTION SUBCUTANEOUS WEEKLY
Qty: 2 ML | Refills: 0 | Status: SHIPPED | OUTPATIENT
Start: 2025-01-17 | End: 2025-02-14

## 2025-01-17 RX ORDER — METFORMIN HYDROCHLORIDE 750 MG/1
750 TABLET, EXTENDED RELEASE ORAL
Qty: 90 TABLET | Refills: 0 | Status: SHIPPED | OUTPATIENT
Start: 2025-01-17

## 2025-01-17 NOTE — PATIENT INSTRUCTIONS
Zepbound Instructions:    - Begin Zepbound 2.5 mg subcutaneously once a week. Dose changes may occur after 4 doses if medication is tolerated. You will be assessed prior to each dose change to make sure you are tolerating the medication well.  - Please message me when you have 2 pens left from the prescription so there are no lapses in treatment.  - If you have been off the medication for more than 14 days please contact the office as you will need to restart the titration at the starting dose again to avoid significant side effects or adverse events.  - Visit [email or web address] for further information/injection instructions.   -Please eat small frequent meals to help reduce nausea. Lemon water and saltine crackers may help with this.   - Side effects of Zepbound discussed: nausea, vomiting, diarrhea, and constipation. If you experience fever, nausea/vomiting, and pain radiating to your back this may be a sign of pancreatitis. Please go to the emergency room if this occurs.  - If on oral birth control a 2nd method of birth control is recommended during the 1st 8 weeks of therapy and for 4 weeks after any dosage change.   - Patient understands the side effects of the medication and proper administration. Patient agrees with the treatment plan and all questions were answered.     One more week of wellbutrin daily and then stop.

## 2025-01-17 NOTE — ASSESSMENT & PLAN NOTE
- Patient is pursuing Conservative Program and follow up visits with medical weight management provider  - Initial weight loss goal of 5-10% weight loss for improved health. Weight loss can improve patient's co-morbid conditions and/or prevent weight-related complications.  - Explained the importance of continuing lifestyle changes in addition to any anti-obesity medications.   - Labs reviewed from 6/2024    General Recommendations:  Nutrition:  Eat breakfast daily.  Do not skip meals.      Food log (ie.) www.[email or web address], sparkpeople.com, loseit.com, calorieking.com, etc.     Practice mindful eating.  Be sure to set aside time to eat, eat slowly, and savor your food.     Hydration:    At least 64oz of water daily.  No sugar sweetened beverages.  No juice (eat the fruit instead).     Exercise:  Studies have shown that the ideal exercise goal is somewhere between 150 to 300 minutes of moderate intensity exercise a week.  Start with exercising 10 minutes every other day and gradually increase physical activity with a goal of at least 150 minutes of moderate intensity exercise a week, divided over at least 3 days a week.  An example of this would be exercising 30 minutes a day, 5 days a week.  Resistance training can increase muscle mass and increase our resting metabolic rate.   FULL BODY resistance training is recommended 2-3 times a week.  Do not do this on consecutive days to allow for muscle recovery.     Aim for a bare minimum 5000 steps, even on days you do not exercise.     Monitoring:   Weigh yourself daily.  If this causes undue stress, then just weigh yourself once a week.  Weigh yourself the same time of the day with the same amount of clothing on.  Preferably this should be done after waking up, before you eat, and with no clothing or minimal clothing on.     Specific Goals:  Discussed her lifestyle and nutrition and she admits she has been off track lately and not really trying to monitor her intake.  She has seen Britta in the past and has her meal plans from her that she plans to start focusing on again.   Encouraged to start tracking he food.   Eat at least 3 meals per day with a focus on protein, do not skip meals. Protein rich snacks discussed and protein shake options discussed.  Exercise 1-2 days per week to start for 10-15 minutes per day and increase from there.  Importance of adding strength and resistance training discussed.   Discussed her medications and she is interested in switching to zepbound from contrave as she may not have the same side effect issues that she had with the Wegovy. Patient denies personal history of pancreatitis. Patient also denies personal and family history of medullary thyroid cancer and multiple endocrine neoplasia type 2 (MEN 2 tumor). Patient denies any history of kidney stones, seizures, or glaucoma.   Zepbound Instructions Reviewed:  - Begin Zepbound 2.5 mg subcutaneously once a week. Dose changes may occur after 4 doses if medication is tolerated. You will be assessed prior to each dose change to make sure you are tolerating the medication well.  - Please message me when you have 2 pens left from the prescription so there are no lapses in treatment.  - If you have been off the medication for more than 14 days please contact the office as you will need to restart the titration at the starting dose again to avoid significant side effects or adverse events.  - Visit [email or web address].com for further information/injection instructions.   -Please eat small frequent meals to help reduce nausea. Lemon water and saltine crackers may help with this.   - Side effects of Zepbound discussed: nausea, vomiting, diarrhea, and constipation. If you experience fever, nausea/vomiting, and pain radiating to your back this may be a sign of pancreatitis. Please go to the emergency room if this occurs.  - If on oral birth control a 2nd method of birth control is recommended during the 1st 8 weeks of  therapy and for 4 weeks after any dosage change.   - Patient understands the side effects of the medication and proper administration. Patient agrees with the treatment plan and all questions were answered.   She will wean off of the Wellbutrin in the meantime.   Medication consent was reviewed today and all questions were answered.  Patient agreed with all bulleted points.  Consent was signed, scanned into chart and patient was provided with a copy for their records.   Follow up in 3 months.

## 2025-01-21 DIAGNOSIS — F41.1 GAD (GENERALIZED ANXIETY DISORDER): ICD-10-CM

## 2025-01-21 DIAGNOSIS — F32.89 OTHER DEPRESSION: ICD-10-CM

## 2025-01-21 RX ORDER — SERTRALINE HYDROCHLORIDE 100 MG/1
100 TABLET, FILM COATED ORAL DAILY
Qty: 90 TABLET | Refills: 0 | Status: SHIPPED | OUTPATIENT
Start: 2025-01-21 | End: 2025-07-20

## 2025-01-22 ENCOUNTER — TELEPHONE (OUTPATIENT)
Dept: BARIATRICS | Facility: CLINIC | Age: 40
End: 2025-01-22

## 2025-01-31 ENCOUNTER — OFFICE VISIT (OUTPATIENT)
Dept: FAMILY MEDICINE CLINIC | Facility: CLINIC | Age: 40
End: 2025-01-31
Payer: COMMERCIAL

## 2025-01-31 VITALS
RESPIRATION RATE: 18 BRPM | DIASTOLIC BLOOD PRESSURE: 82 MMHG | BODY MASS INDEX: 43.4 KG/M2 | HEART RATE: 80 BPM | OXYGEN SATURATION: 98 % | SYSTOLIC BLOOD PRESSURE: 122 MMHG | WEIGHT: 293 LBS | HEIGHT: 69 IN

## 2025-01-31 DIAGNOSIS — R21 RASH AND NONSPECIFIC SKIN ERUPTION: Primary | ICD-10-CM

## 2025-01-31 PROCEDURE — 99213 OFFICE O/P EST LOW 20 MIN: CPT | Performed by: PHYSICIAN ASSISTANT

## 2025-01-31 NOTE — PROGRESS NOTES
"Name: Alix Garcia      : 1985      MRN: [id number]  Encounter Provider: Ginny Crenshaw PA-C  Encounter Date: 2025   Encounter department: [facility] [address]  :  Assessment & Plan  Rash and nonspecific skin eruption  Claritin daily for 2 weeks  Cool compresses              History of Present Illness   Rash  This is a new problem. The current episode started in the past 7 days. The problem has been waxing and waning since onset. The rash is diffuse, neck and chest. The affected locations include the diffuse, neck and chest. The rash is characterized by itchiness. She was exposed to nothing. Associated symptoms include diarrhea. Pertinent negatives include no congestion, fever or shortness of breath. Past treatments include nothing. The treatment provided no relief. Her past medical history is significant for allergies. There were no sick contacts.     Review of Systems   Constitutional:  Negative for chills, diaphoresis and fever.   HENT:  Negative for congestion and trouble swallowing.    Respiratory:  Negative for chest tightness and shortness of breath.    Gastrointestinal:  Positive for diarrhea.   Skin:  Positive for rash.       Objective   /82 (BP Location: Left arm, Patient Position: Sitting, Cuff Size: Large)   Pulse 80   Resp 18   Ht 5' 8.5\" (1.74 m)   Wt 136 kg (300 lb)   SpO2 98%   BMI 44.95 kg/m²      Physical Exam  Vitals and nursing note reviewed.   Constitutional:       Appearance: Normal appearance.   HENT:      Head: Normocephalic.   Eyes:      Conjunctiva/sclera: Conjunctivae normal.   Cardiovascular:      Rate and Rhythm: Normal rate.   Pulmonary:      Effort: Pulmonary effort is normal.   Skin:     Findings: Rash present. Rash is macular and papular.   Neurological:      Mental Status: She is alert and oriented to person, place, and time.   Psychiatric:         Mood and Affect: Mood normal.         Behavior: Behavior " normal.         Answers submitted by the patient for this visit:  Rash Questionnaire (Submitted on 1/31/2025)  Chief Complaint: Rash

## 2025-02-14 ENCOUNTER — OFFICE VISIT (OUTPATIENT)
Dept: FAMILY MEDICINE CLINIC | Facility: CLINIC | Age: 40
End: 2025-02-14
Payer: COMMERCIAL

## 2025-02-14 VITALS
OXYGEN SATURATION: 98 % | BODY MASS INDEX: 43.4 KG/M2 | HEART RATE: 98 BPM | HEIGHT: 69 IN | DIASTOLIC BLOOD PRESSURE: 82 MMHG | RESPIRATION RATE: 18 BRPM | WEIGHT: 293 LBS | SYSTOLIC BLOOD PRESSURE: 122 MMHG

## 2025-02-14 DIAGNOSIS — R30.0 DYSURIA: Primary | ICD-10-CM

## 2025-02-14 LAB
BACTERIA UR QL AUTO: ABNORMAL /HPF
BILIRUB UR QL STRIP: NEGATIVE
CLARITY UR: CLEAR
COLOR UR: COLORLESS
GLUCOSE UR STRIP-MCNC: NEGATIVE MG/DL
HGB UR QL STRIP.AUTO: ABNORMAL
KETONES UR STRIP-MCNC: NEGATIVE MG/DL
LEUKOCYTE ESTERASE UR QL STRIP: ABNORMAL
NITRITE UR QL STRIP: NEGATIVE
NON-SQ EPI CELLS URNS QL MICRO: ABNORMAL /HPF
PH UR STRIP.AUTO: 6 [PH]
PROT UR STRIP-MCNC: NEGATIVE MG/DL
RBC #/AREA URNS AUTO: ABNORMAL /HPF
SL AMB  POCT GLUCOSE, UA: ABNORMAL
SL AMB LEUKOCYTE ESTERASE,UA: ABNORMAL
SL AMB POCT BILIRUBIN,UA: ABNORMAL
SL AMB POCT BLOOD,UA: ABNORMAL
SL AMB POCT CLARITY,UA: CLEAR
SL AMB POCT COLOR,UA: YELLOW
SL AMB POCT KETONES,UA: ABNORMAL
SL AMB POCT NITRITE,UA: ABNORMAL
SL AMB POCT PH,UA: 6
SL AMB POCT SPECIFIC GRAVITY,UA: ABNORMAL
SL AMB POCT URINE PROTEIN: ABNORMAL
SL AMB POCT UROBILINOGEN: ABNORMAL
SP GR UR STRIP.AUTO: 1.01 (ref 1–1.03)
UROBILINOGEN UR STRIP-ACNC: <2 MG/DL
WBC #/AREA URNS AUTO: ABNORMAL /HPF

## 2025-02-14 PROCEDURE — 81001 URINALYSIS AUTO W/SCOPE: CPT | Performed by: PHYSICIAN ASSISTANT

## 2025-02-14 PROCEDURE — 87086 URINE CULTURE/COLONY COUNT: CPT | Performed by: PHYSICIAN ASSISTANT

## 2025-02-14 PROCEDURE — 99213 OFFICE O/P EST LOW 20 MIN: CPT | Performed by: PHYSICIAN ASSISTANT

## 2025-02-14 PROCEDURE — 81002 URINALYSIS NONAUTO W/O SCOPE: CPT | Performed by: PHYSICIAN ASSISTANT

## 2025-02-14 RX ORDER — NITROFURANTOIN 25; 75 MG/1; MG/1
100 CAPSULE ORAL 2 TIMES DAILY
Qty: 10 CAPSULE | Refills: 0 | Status: SHIPPED | OUTPATIENT
Start: 2025-02-14 | End: 2025-02-19

## 2025-02-14 NOTE — PROGRESS NOTES
"Name: Alix Garcia      : 1985      MRN: [id number]  Encounter Provider: Ginny Crenshaw PA-C  Encounter Date: 2025   Encounter department: [facility] [address]  :  Assessment & Plan  Dysuria    Orders:  •  nitrofurantoin (MACROBID) 100 mg capsule; Take 1 capsule (100 mg total) by mouth 2 (two) times a day for 5 days  •  POCT urine dip  •  Urine culture; Future  •  Urinalysis with microscopic; Future           History of Present Illness   Urinary Tract Infection   This is a new problem. The current episode started yesterday. The problem occurs intermittently. The problem has been unchanged. The quality of the pain is described as burning. The pain is mild. There has been no fever. There is No history of pyelonephritis. Associated symptoms include frequency and urgency. Pertinent negatives include no chills, discharge, flank pain, hematuria, hesitancy, nausea, possible pregnancy, sweats or vomiting. She has tried increased fluids for the symptoms. The treatment provided mild relief.     Review of Systems   Constitutional:  Negative for chills and fever.   Gastrointestinal:  Negative for nausea and vomiting.   Genitourinary:  Positive for dysuria, frequency and urgency. Negative for difficulty urinating, flank pain, hematuria and hesitancy.   Musculoskeletal:  Negative for myalgias.   Neurological:  Negative for dizziness and headaches.       Objective   /82 (BP Location: Left arm, Patient Position: Sitting, Cuff Size: Large)   Pulse 98   Resp 18   Ht 5' 8.5\" (1.74 m)   Wt 136 kg (300 lb)   SpO2 98%   BMI 44.95 kg/m²      Physical Exam  Vitals and nursing note reviewed.   Constitutional:       Appearance: Normal appearance.   Cardiovascular:      Rate and Rhythm: Normal rate.   Pulmonary:      Effort: Pulmonary effort is normal.   Abdominal:      General: Bowel sounds are normal.      Palpations: Abdomen is soft. There is no mass.      " Tenderness: There is no abdominal tenderness. There is no right CVA tenderness or left CVA tenderness.   Neurological:      Mental Status: She is alert and oriented to person, place, and time.   Psychiatric:         Mood and Affect: Mood normal.         Behavior: Behavior normal.

## 2025-02-16 LAB — BACTERIA UR CULT: NORMAL

## 2025-02-17 ENCOUNTER — RESULTS FOLLOW-UP (OUTPATIENT)
Dept: FAMILY MEDICINE CLINIC | Facility: CLINIC | Age: 40
End: 2025-02-17

## 2025-03-19 ENCOUNTER — HOSPITAL ENCOUNTER (OUTPATIENT)
Dept: MAMMOGRAPHY | Facility: CLINIC | Age: 40
Discharge: HOME | End: 2025-03-19
Payer: COMMERCIAL

## 2025-03-19 VITALS — WEIGHT: 293 LBS | BODY MASS INDEX: 43.4 KG/M2 | HEIGHT: 69 IN

## 2025-03-19 DIAGNOSIS — Z80.3 FAMILY HISTORY OF BREAST CANCER IN MOTHER: ICD-10-CM

## 2025-03-19 PROCEDURE — 77067 SCR MAMMO BI INCL CAD: CPT

## 2025-03-19 PROCEDURE — 77063 BREAST TOMOSYNTHESIS BI: CPT

## 2025-03-20 ENCOUNTER — RESULTS FOLLOW-UP (OUTPATIENT)
Dept: FAMILY MEDICINE CLINIC | Facility: CLINIC | Age: 40
End: 2025-03-20

## 2025-03-26 DIAGNOSIS — E28.2 PCOS (POLYCYSTIC OVARIAN SYNDROME): ICD-10-CM

## 2025-03-26 DIAGNOSIS — E66.01 CLASS 3 SEVERE OBESITY DUE TO EXCESS CALORIES WITH SERIOUS COMORBIDITY AND BODY MASS INDEX (BMI) OF 40.0 TO 44.9 IN ADULT (HCC): Primary | ICD-10-CM

## 2025-03-26 DIAGNOSIS — E66.813 CLASS 3 SEVERE OBESITY DUE TO EXCESS CALORIES WITH SERIOUS COMORBIDITY AND BODY MASS INDEX (BMI) OF 40.0 TO 44.9 IN ADULT (HCC): Primary | ICD-10-CM

## 2025-03-26 RX ORDER — TIRZEPATIDE 5 MG/.5ML
5 INJECTION, SOLUTION SUBCUTANEOUS WEEKLY
Qty: 2 ML | Refills: 1 | Status: SHIPPED | OUTPATIENT
Start: 2025-03-26 | End: 2025-05-21

## 2025-04-22 ENCOUNTER — OFFICE VISIT (OUTPATIENT)
Dept: BARIATRICS | Facility: CLINIC | Age: 40
End: 2025-04-22
Payer: COMMERCIAL

## 2025-04-22 VITALS
SYSTOLIC BLOOD PRESSURE: 124 MMHG | HEIGHT: 69 IN | WEIGHT: 291.8 LBS | DIASTOLIC BLOOD PRESSURE: 80 MMHG | BODY MASS INDEX: 43.22 KG/M2 | OXYGEN SATURATION: 98 % | HEART RATE: 100 BPM

## 2025-04-22 DIAGNOSIS — E66.813 CLASS 3 SEVERE OBESITY DUE TO EXCESS CALORIES WITH SERIOUS COMORBIDITY AND BODY MASS INDEX (BMI) OF 40.0 TO 44.9 IN ADULT: ICD-10-CM

## 2025-04-22 DIAGNOSIS — E28.2 PCOS (POLYCYSTIC OVARIAN SYNDROME): ICD-10-CM

## 2025-04-22 PROCEDURE — 99214 OFFICE O/P EST MOD 30 MIN: CPT

## 2025-04-22 RX ORDER — TIRZEPATIDE 5 MG/.5ML
5 INJECTION, SOLUTION SUBCUTANEOUS WEEKLY
Qty: 2 ML | Refills: 1 | Status: SHIPPED | OUTPATIENT
Start: 2025-04-22 | End: 2025-06-17

## 2025-04-22 NOTE — PROGRESS NOTES
Assessment/Plan:     Class 3 severe obesity due to excess calories with serious comorbidity and body mass index (BMI) of 40.0 to 44.9 in adult (HCC)  - Patient is pursuing Conservative Program and follow up visits with medical weight management provider  - Initial weight loss goal of 5-10% weight loss for improved health. Weight loss can improve patient's co-morbid conditions and/or prevent weight-related complications.  - Explained the importance of continuing lifestyle changes in addition to any anti-obesity medications.   - Labs reviewed from 9/2024    General Recommendations:  Nutrition:  Eat breakfast daily.  Do not skip meals.      Food log (ie.) [email or web address], sparkpeople.com, loseit.com, [email or web address], etc.     Practice mindful eating.  Be sure to set aside time to eat, eat slowly, and savor your food.     Hydration:    At least 64oz of water daily.  No sugar sweetened beverages.  No juice (eat the fruit instead).     Exercise:  Studies have shown that the ideal exercise goal is somewhere between 150 to 300 minutes of moderate intensity exercise a week.  Start with exercising 10 minutes every other day and gradually increase physical activity with a goal of at least 150 minutes of moderate intensity exercise a week, divided over at least 3 days a week.  An example of this would be exercising 30 minutes a day, 5 days a week.  Resistance training can increase muscle mass and increase our resting metabolic rate.   FULL BODY resistance training is recommended 2-3 times a week.  Do not do this on consecutive days to allow for muscle recovery.     Aim for a bare minimum 5000 steps, even on days you do not exercise.     Monitoring:   Weigh yourself daily.  If this causes undue stress, then just weigh yourself once a week.  Weigh yourself the same time of the day with the same amount of clothing on.  Preferably this should be done after waking up, before you eat, and with no clothing or minimal clothing  on.     Specific Goals:  Discussed her lifestyle and nutrition and she has been trying to make changes that we discussed last time but still has her moments.  She has seen Britta in the past and has her meal plans from her that she plans to start focusing on again.   Encouraged to start tracking he food.   Eat at least 3 meals per day with a focus on protein, do not skip meals. Protein rich snacks discussed and protein shake options discussed.  Exercise 1-2 days per week to start for 10-15 minutes per day and increase from there.  Importance of adding strength and resistance training discussed.   Discussed her medications and she is interested in switching to zepbound from contrave as she may not have the same side effect issues that she had with the Wegovy. She was started on zepbound- will keep her on 5 mg for now as she is doing well and losing weight .    Patient denies personal history of pancreatitis. Patient also denies personal and family history of medullary thyroid cancer and multiple endocrine neoplasia type 2 (MEN 2 tumor). Patient denies any history of kidney stones, seizures, or glaucoma.          Alix was seen today for follow-up.    Diagnoses and all orders for this visit:    Class 3 severe obesity due to excess calories with serious comorbidity and body mass index (BMI) of 40.0 to 44.9 in adult  -     tirzepatide (Zepbound) 5 mg/0.5 mL auto-injector; Inject 0.5 mL (5 mg total) under the skin once a week    PCOS (polycystic ovarian syndrome)  -     tirzepatide (Zepbound) 5 mg/0.5 mL auto-injector; Inject 0.5 mL (5 mg total) under the skin once a week        Total time spent reviewing chart, interviewing patient, examining patient, discussing plan, answering all questions, and documentin minutes with >50% face-to-face time with the patient.    Follow up in approximately 3 months with Non-Surgical Physician/Advanced Practitioner.    Subjective:   Chief Complaint   Patient presents with     Follow-up       Patient ID: Alix Garcia  is a 39 y.o. female with excess weight/obesity here to pursue weight management.  Patient is pursuing Conservative Program.   Most recent notes and records were reviewed.    HPI    Wt Readings from Last 20 Encounters:   04/22/25 132 kg (291 lb 12.8 oz)   03/19/25 133 kg (293 lb)   02/14/25 136 kg (300 lb)   01/31/25 136 kg (300 lb)   01/17/25 134 kg (294 lb 9.6 oz)   09/11/24 134 kg (295 lb 3.2 oz)   08/28/24 134 kg (294 lb 6.4 oz)   08/22/24 132 kg (292 lb)   08/14/24 132 kg (290 lb 3.2 oz)   08/07/24 132 kg (290 lb 9.6 oz)   07/24/24 132 kg (290 lb 9.6 oz)   07/10/24 132 kg (290 lb 9.6 oz)   07/02/24 132 kg (292 lb)   06/26/24 132 kg (291 lb 3.2 oz)   06/19/24 133 kg (292 lb 6.4 oz)   06/12/24 135 kg (298 lb 6.4 oz)   06/05/24 134 kg (296 lb 3.2 oz)   05/30/24 134 kg (294 lb 6.4 oz)   05/10/24 134 kg (296 lb)   05/01/24 134 kg (295 lb 12.8 oz)       Patient presents today to medical weight management office for follow up. She is here to follow up after being on contrave. She admits that she has probably not been taking the medication correctly, she takes the Wellbutrin once daily instead of twice daily and she never received the naltrexone so she never took it. So subsequently she is not feeling much different on the Wellbutrin. She has tried Wegovy in the past but had side effects when she got to 0.5mg so was taken off and started on naltrexone. She is also on metformin currently for PCOS.     Weight loss medication and dose: zepbound 5 mg   Started weight and date: 294 lbs 1/17/2025  Current weight: 291 lbs   Difference: -3 lbs     Starting BMI: 45  Current BMI: 43       Food recall   B- Bagel with CC and strawberries   L- Cup Of soups   S- granola bar and string cheese   D- turkey sandwich and fruit   Take out frequency: a couple times a week      Hydration- Water mostly   Alcohol- rare  Exercise- no      Colonoscopy: na  Mammogram: na      The following portions of  "the patient's history were reviewed and updated as appropriate: allergies, current medications, past family history, past medical history, past social history, past surgical history, and problem list.    Family History   Problem Relation Age of Onset    Breast cancer Mother 52    Alcohol abuse Father     Heart failure Father     Heart disease Father     Hypertension Father     Breast cancer Maternal Grandmother 55    Diabetes Paternal Grandmother     Bipolar disorder Paternal Aunt     Alcohol abuse Paternal Aunt     Colon cancer Paternal Aunt     Colon cancer Paternal Uncle     Stroke Paternal Uncle     Ovarian cancer Neg Hx     Uterine cancer Neg Hx     Cervical cancer Neg Hx     Thyroid disease Neg Hx         Review of Systems   Constitutional:  Negative for fatigue.   HENT:  Negative for sore throat.    Respiratory:  Negative for cough and shortness of breath.    Cardiovascular:  Negative for chest pain, palpitations and leg swelling.   Gastrointestinal:  Negative for abdominal pain, constipation, diarrhea, nausea and vomiting.   Genitourinary:  Negative for dysuria.   Musculoskeletal:  Negative for arthralgias and back pain.   Skin:  Negative for rash.   Neurological:  Negative for headaches.   Psychiatric/Behavioral:  Negative for dysphoric mood. The patient is not nervous/anxious.        Objective:  /80 (BP Location: Left arm, Patient Position: Sitting, Cuff Size: Large)   Pulse 100   Ht 5' 8.5\" (1.74 m)   Wt 132 kg (291 lb 12.8 oz)   SpO2 98%   BMI 43.72 kg/m²     Physical Exam  Vitals and nursing note reviewed.   Constitutional:       Appearance: Normal appearance. She is obese.   HENT:      Head: Normocephalic.   Pulmonary:      Effort: Pulmonary effort is normal.   Neurological:      Mental Status: She is oriented to person, place, and time.   Psychiatric:         Mood and Affect: Mood normal.         Behavior: Behavior normal.         Thought Content: Thought content normal.         Judgment: " Judgment normal.            Labs   Most recent labs reviewed   Lab Results   Component Value Date    SODIUM 136 09/14/2023    K 4.8 09/14/2023     09/14/2023    CO2 26 09/14/2023    AGAP 5 09/14/2023    BUN 14 09/14/2023    CREATININE 1.03 09/14/2023    GLUC 80 11/26/2018    GLUF 85 09/14/2023    CALCIUM 9.7 09/14/2023    AST 26 06/07/2023    ALT 33 06/07/2023    ALKPHOS 42 06/07/2023    TP 7.9 06/07/2023    TBILI 0.61 06/07/2023    EGFR 69 09/14/2023     Lab Results   Component Value Date    HGBA1C 5.2 09/10/2024     Lab Results   Component Value Date    TTZ7QIQCRWUU 2.107 01/25/2023    TSH 1.72 11/09/2018     Lab Results   Component Value Date    CHOLESTEROL 150 09/10/2024     Lab Results   Component Value Date    HDL 42 (L) 09/10/2024     Lab Results   Component Value Date    TRIG 107 09/10/2024     Lab Results   Component Value Date    LDLCALC 87 09/10/2024

## 2025-04-22 NOTE — ASSESSMENT & PLAN NOTE
- Patient is pursuing Conservative Program and follow up visits with medical weight management provider  - Initial weight loss goal of 5-10% weight loss for improved health. Weight loss can improve patient's co-morbid conditions and/or prevent weight-related complications.  - Explained the importance of continuing lifestyle changes in addition to any anti-obesity medications.   - Labs reviewed from 9/2024    General Recommendations:  Nutrition:  Eat breakfast daily.  Do not skip meals.      Food log (ie.) [email or web address], sparkpeople.com, loseit.com, [employer]king.com, etc.     Practice mindful eating.  Be sure to set aside time to eat, eat slowly, and savor your food.     Hydration:    At least 64oz of water daily.  No sugar sweetened beverages.  No juice (eat the fruit instead).     Exercise:  Studies have shown that the ideal exercise goal is somewhere between 150 to 300 minutes of moderate intensity exercise a week.  Start with exercising 10 minutes every other day and gradually increase physical activity with a goal of at least 150 minutes of moderate intensity exercise a week, divided over at least 3 days a week.  An example of this would be exercising 30 minutes a day, 5 days a week.  Resistance training can increase muscle mass and increase our resting metabolic rate.   FULL BODY resistance training is recommended 2-3 times a week.  Do not do this on consecutive days to allow for muscle recovery.     Aim for a bare minimum 5000 steps, even on days you do not exercise.     Monitoring:   Weigh yourself daily.  If this causes undue stress, then just weigh yourself once a week.  Weigh yourself the same time of the day with the same amount of clothing on.  Preferably this should be done after waking up, before you eat, and with no clothing or minimal clothing on.     Specific Goals:  Discussed her lifestyle and nutrition and she has been trying to make changes that we discussed last time but still has her  moments.  She has seen Britta in the past and has her meal plans from her that she plans to start focusing on again.   Encouraged to start tracking he food.   Eat at least 3 meals per day with a focus on protein, do not skip meals. Protein rich snacks discussed and protein shake options discussed.  Exercise 1-2 days per week to start for 10-15 minutes per day and increase from there.  Importance of adding strength and resistance training discussed.   Discussed her medications and she is interested in switching to zepbound from contrave as she may not have the same side effect issues that she had with the Wegovy. She was started on zepbound- will keep her on 5 mg for now as she is doing well and losing weight .    Patient denies personal history of pancreatitis. Patient also denies personal and family history of medullary thyroid cancer and multiple endocrine neoplasia type 2 (MEN 2 tumor). Patient denies any history of kidney stones, seizures, or glaucoma.

## 2025-05-27 DIAGNOSIS — E28.2 PCOS (POLYCYSTIC OVARIAN SYNDROME): ICD-10-CM

## 2025-05-27 DIAGNOSIS — R73.03 PREDIABETES: ICD-10-CM

## 2025-05-27 DIAGNOSIS — F32.89 OTHER DEPRESSION: ICD-10-CM

## 2025-05-27 DIAGNOSIS — E66.813 CLASS 3 SEVERE OBESITY DUE TO EXCESS CALORIES WITH SERIOUS COMORBIDITY AND BODY MASS INDEX (BMI) OF 40.0 TO 44.9 IN ADULT: ICD-10-CM

## 2025-05-27 DIAGNOSIS — F41.1 GAD (GENERALIZED ANXIETY DISORDER): ICD-10-CM

## 2025-05-28 RX ORDER — METFORMIN HYDROCHLORIDE 750 MG/1
750 TABLET, EXTENDED RELEASE ORAL
Qty: 90 TABLET | Refills: 1 | Status: SHIPPED | OUTPATIENT
Start: 2025-05-28

## 2025-05-28 RX ORDER — TIRZEPATIDE 5 MG/.5ML
5 INJECTION, SOLUTION SUBCUTANEOUS WEEKLY
Qty: 6 ML | Refills: 0 | Status: SHIPPED | OUTPATIENT
Start: 2025-05-28 | End: 2025-08-20

## 2025-05-28 RX ORDER — SERTRALINE HYDROCHLORIDE 100 MG/1
100 TABLET, FILM COATED ORAL DAILY
Qty: 90 TABLET | Refills: 1 | Status: SHIPPED | OUTPATIENT
Start: 2025-05-28 | End: 2025-11-24

## 2025-05-29 ENCOUNTER — APPOINTMENT (OUTPATIENT)
Dept: LAB | Facility: HOSPITAL | Age: 40
End: 2025-05-29

## 2025-05-29 DIAGNOSIS — Z00.8 HEALTH EXAMINATION IN POPULATION SURVEY: ICD-10-CM

## 2025-05-29 LAB
CHOLEST SERPL-MCNC: 94 MG/DL (ref ?–200)
EST. AVERAGE GLUCOSE BLD GHB EST-MCNC: 94 MG/DL
HBA1C MFR BLD: 4.9 %
HDLC SERPL-MCNC: 26 MG/DL
LDLC SERPL CALC-MCNC: 38 MG/DL (ref 0–100)
NONHDLC SERPL-MCNC: 68 MG/DL
TRIGL SERPL-MCNC: 148 MG/DL (ref ?–150)

## 2025-05-29 PROCEDURE — 83036 HEMOGLOBIN GLYCOSYLATED A1C: CPT

## 2025-05-29 PROCEDURE — 36415 COLL VENOUS BLD VENIPUNCTURE: CPT

## 2025-05-29 PROCEDURE — 80061 LIPID PANEL: CPT

## 2025-07-08 DIAGNOSIS — E66.813 CLASS 3 SEVERE OBESITY DUE TO EXCESS CALORIES WITH SERIOUS COMORBIDITY AND BODY MASS INDEX (BMI) OF 40.0 TO 44.9 IN ADULT: ICD-10-CM

## 2025-07-08 DIAGNOSIS — E28.2 PCOS (POLYCYSTIC OVARIAN SYNDROME): ICD-10-CM

## 2025-07-08 RX ORDER — TIRZEPATIDE 5 MG/.5ML
5 INJECTION, SOLUTION SUBCUTANEOUS WEEKLY
Qty: 2 ML | Refills: 0 | Status: SHIPPED | OUTPATIENT
Start: 2025-07-08 | End: 2025-09-30

## 2025-08-18 DIAGNOSIS — E66.813 CLASS 3 SEVERE OBESITY DUE TO EXCESS CALORIES WITH SERIOUS COMORBIDITY AND BODY MASS INDEX (BMI) OF 40.0 TO 44.9 IN ADULT: ICD-10-CM

## 2025-08-18 DIAGNOSIS — E28.2 PCOS (POLYCYSTIC OVARIAN SYNDROME): ICD-10-CM

## 2025-08-18 RX ORDER — TIRZEPATIDE 5 MG/.5ML
5 INJECTION, SOLUTION SUBCUTANEOUS WEEKLY
Qty: 2 ML | Refills: 0 | Status: SHIPPED | OUTPATIENT
Start: 2025-08-18 | End: 2025-08-21

## 2025-08-21 ENCOUNTER — OFFICE VISIT (OUTPATIENT)
Dept: BARIATRICS | Facility: CLINIC | Age: 40
End: 2025-08-21
Payer: COMMERCIAL

## 2025-08-21 VITALS
WEIGHT: 288.8 LBS | DIASTOLIC BLOOD PRESSURE: 82 MMHG | HEIGHT: 69 IN | BODY MASS INDEX: 42.78 KG/M2 | SYSTOLIC BLOOD PRESSURE: 124 MMHG | RESPIRATION RATE: 16 BRPM | HEART RATE: 76 BPM

## 2025-08-21 DIAGNOSIS — E66.813 CLASS 3 SEVERE OBESITY DUE TO EXCESS CALORIES WITH SERIOUS COMORBIDITY AND BODY MASS INDEX (BMI) OF 40.0 TO 44.9 IN ADULT: ICD-10-CM

## 2025-08-21 DIAGNOSIS — E28.2 PCOS (POLYCYSTIC OVARIAN SYNDROME): Primary | ICD-10-CM

## 2025-08-21 PROCEDURE — 99214 OFFICE O/P EST MOD 30 MIN: CPT

## 2025-08-21 RX ORDER — TIRZEPATIDE 7.5 MG/.5ML
7.5 INJECTION, SOLUTION SUBCUTANEOUS WEEKLY
Qty: 2 ML | Refills: 0 | Status: SHIPPED | OUTPATIENT
Start: 2025-08-21 | End: 2025-09-18

## 2025-08-21 RX ORDER — SPIRONOLACTONE 25 MG/1
TABLET ORAL
COMMUNITY
Start: 2025-05-27

## 2025-08-21 RX ORDER — LISINOPRIL 20 MG/1
TABLET ORAL
COMMUNITY
Start: 2025-07-18